# Patient Record
Sex: MALE | Race: WHITE | NOT HISPANIC OR LATINO | Employment: OTHER | URBAN - METROPOLITAN AREA
[De-identification: names, ages, dates, MRNs, and addresses within clinical notes are randomized per-mention and may not be internally consistent; named-entity substitution may affect disease eponyms.]

---

## 2017-12-06 ENCOUNTER — OFFICE VISIT (OUTPATIENT)
Dept: SLEEP MEDICINE | Facility: HOSPITAL | Age: 72
End: 2017-12-06
Attending: INTERNAL MEDICINE

## 2017-12-06 VITALS
DIASTOLIC BLOOD PRESSURE: 91 MMHG | SYSTOLIC BLOOD PRESSURE: 167 MMHG | HEART RATE: 78 BPM | BODY MASS INDEX: 42.62 KG/M2 | HEIGHT: 69 IN | WEIGHT: 287.75 LBS | OXYGEN SATURATION: 96 %

## 2017-12-06 DIAGNOSIS — G47.33 OSA ON CPAP: Primary | ICD-10-CM

## 2017-12-06 DIAGNOSIS — G47.14 HYPERSOMNIA DUE TO ANOTHER MEDICAL CONDITION: ICD-10-CM

## 2017-12-06 DIAGNOSIS — IMO0001 CLASS 3 OBESITY DUE TO EXCESS CALORIES WITH SERIOUS COMORBIDITY AND BODY MASS INDEX (BMI) OF 40.0 TO 44.9 IN ADULT: ICD-10-CM

## 2017-12-06 DIAGNOSIS — Z99.89 OSA ON CPAP: Primary | ICD-10-CM

## 2017-12-06 PROCEDURE — 99204 OFFICE O/P NEW MOD 45 MIN: CPT | Performed by: INTERNAL MEDICINE

## 2017-12-06 PROCEDURE — G0463 HOSPITAL OUTPT CLINIC VISIT: HCPCS

## 2017-12-06 NOTE — PROGRESS NOTES
"[facility] New Patient/Consultation       Reason for Consultation: STEF    Patient Care Team:  Mariya Lopez MD as PCP - General (Internal Medicine)  Jensen Mas MD as Consulting Physician (Sleep Medicine)    Chief complaint:  STEF    History of present illness:  Thank you for asking me to see your patient.  The patient is a 72 y.o. male who I last saw in December 2010.  He has STEF treated with CPAP +16.  His weight is stable since I last saw him.  He goes to bed at 11 PM and awakens between 7 and 9 AM.  He feels okay upon arising.  He might take 1 or 2 naps.  He has no complaints of hypersomnolence.  His [place] Sleepiness Scale is normal at 5.  He does awaken with a dry mouth.  He has leg jerking at night.  He awakens twice to use the restroom.    Review of Systems:  Recorded on the Sleep Questionnaire.  Unremarkable except for frequent urination, painful joints, swollen ankles toward the end of the day.    History:  Past Medical History:   Diagnosis Date   • Diabetes mellitus    • Hypertension    , No past surgical history on file., No family history on file. and   Social History   Substance Use Topics   • Smoking status: Former Smoker   • Smokeless tobacco: None   • Alcohol use None       Social History:  He is retired.  He smoked between the ages of 14 and 33.  He has 3 cups of coffee a day.    Allergies:  Review of patient's allergies indicates no known allergies.     Medication Review: Reviewed.    Vital Signs:    Vitals:    12/06/17 0943   BP: 167/91   BP Location: Left arm   Patient Position: Sitting   Pulse: 78   SpO2: 96%   Weight: 131 kg (287 lb 12 oz)   Height: 174 cm (68.5\")      Body mass index is 43.12 kg/(m^2).    Physical Exam:  Recorded on the [facility] Physical Exam Form and is unremarkable except for:  A large tongue and uvula and narrow posterior pharyngeal opening and class II-III MP airway.  He does have a systolic ejection murmur audible.  Trace 1+ " pretibial edema noted.     Results Review:  DME is [facility] and he uses a fullface mask.  Downloads between June 9 and December 5, 2017 compliances 99% and average usage is 7 hours and 59 minutes and he uses CPAP +16.  No diagnostics available.       Impression:   Obstructive sleep apnea treated with CPAP +16 with good compliance and usage.  He has some complaints of hypersomnolence because she will take 1 or 2 naps a day.  However, [place] Sleepiness Scale is normal.    Plan:  Good sleep hygiene measures should be maintained.  Weight loss would be beneficial in this patient who is morbidly obese.    I did reviewed the pathophysiology of STEF to the patient.  He has compliant.        The patient will continue CPAP.  Since his device is obsolete, I will order a new auto CPAP device between 15 and 20 cm water pressure.  Additionally, all needed supplies requested.  I will see the patient back in 3 months to check on the new device.     Thank you for requesting me to assist in this patient's care.    Jensen Mas MD  12/06/17  10:10 AM

## 2017-12-10 PROBLEM — G47.14 HYPERSOMNIA DUE TO ANOTHER MEDICAL CONDITION: Status: ACTIVE | Noted: 2017-12-10

## 2017-12-10 PROBLEM — IMO0001 CLASS 3 OBESITY DUE TO EXCESS CALORIES WITH SERIOUS COMORBIDITY AND BODY MASS INDEX (BMI) OF 40.0 TO 44.9 IN ADULT: Status: ACTIVE | Noted: 2017-12-10

## 2017-12-10 PROBLEM — G47.33 OSA ON CPAP: Status: ACTIVE | Noted: 2017-12-10

## 2017-12-10 PROBLEM — Z99.89 OSA ON CPAP: Status: ACTIVE | Noted: 2017-12-10

## 2017-12-13 ENCOUNTER — TELEPHONE (OUTPATIENT)
Dept: SLEEP MEDICINE | Facility: HOSPITAL | Age: 72
End: 2017-12-13

## 2017-12-13 NOTE — TELEPHONE ENCOUNTER
Pt called in stating [facility] did not receive DME order, pt was inquiring to if we had it and could send it. Could not find chart on pt, may have been broke down and send to Medical Records. Informed pt to call back on Friday to see if paper work has been scanned in by medical records.

## 2017-12-13 NOTE — TELEPHONE ENCOUNTER
Tech called pt @ home #, no answer. Left vm informing pt that [facility] did receive orders and were working on processing at this time. MAB

## 2018-03-08 ENCOUNTER — OFFICE VISIT (OUTPATIENT)
Dept: SLEEP MEDICINE | Facility: HOSPITAL | Age: 73
End: 2018-03-08
Attending: INTERNAL MEDICINE

## 2018-03-08 DIAGNOSIS — G47.33 OSA ON CPAP: Primary | ICD-10-CM

## 2018-03-08 DIAGNOSIS — Z99.89 OSA ON CPAP: Primary | ICD-10-CM

## 2018-03-08 DIAGNOSIS — IMO0001 CLASS 3 OBESITY DUE TO EXCESS CALORIES WITH SERIOUS COMORBIDITY AND BODY MASS INDEX (BMI) OF 40.0 TO 44.9 IN ADULT: ICD-10-CM

## 2018-03-08 PROCEDURE — G0463 HOSPITAL OUTPT CLINIC VISIT: HCPCS

## 2018-03-08 PROCEDURE — 99213 OFFICE O/P EST LOW 20 MIN: CPT | Performed by: INTERNAL MEDICINE

## 2018-03-08 NOTE — PROGRESS NOTES
Follow Up [facility] Note       Patient Care Team:  Mariya Lopez MD as PCP - General (Internal Medicine)  Jensen Mas MD as Consulting Physician (Sleep Medicine)    Chief Complaint:  STEF     Interval History:   The patient was last seen by me in December 2017.  He is stable and unchanged.  He goes to bed at 10 PM awakens at 7 AM.  He awakens twice for the bathroom.  [place] Sleepiness Scale is normal at 5.    Review of Systems:  Recorded on the Sleep Questionnaire.  Unremarkable .    Social History:  He will have 3 cups of coffee a day  Social History     Social History   • Marital status:      Social History Main Topics   • Smoking status: Former Smoker      Comment: Smoked between the ages of 25 and 40.   • Alcohol use No   • Drug use: No     Other Topics Concern   • Not on file       Allergies:  Review of patient's allergies indicates no known allergies.     Medication Review:  Reviewed.      Vital Signs:  Height 68.5 inches and weight 278 and he is morbidly obese with a body mass index of 42    Physical Exam:    Constitutional:  Well developed white male and appears in no apparent distress.  Awake & oriented times 3.  Normal mood with normal recent and remote memory and normal judgement.  Eyes:  Conjunctivae normal.  Oropharynx:  moist mucous membranes without exudate and a large tongue and uvula and narrow posterior pharyngeal opening and class II-III MP airway.      Results Review:  DME is [facility] and he uses a fullface mask.  Downloads between September 9, 2017 and March 7, 2018 compliances 84%.  Average usage is 8 hours and 9 minutes.  Average AHI is normal without leak.  Average AutoCPAP pressure is 16.8 and his auto CPAP is 15-20.       Impression:   Obstructive sleep apnea adequately treated with auto titrating CPAP with good compliance and usage and no complaints of hypersomnolence.      Plan:  Good sleep hygiene measures should be maintained.  Weight loss would  be beneficial in this patient who is obese by BMI.  The patient is benefiting from the treatment being provided.     The patient will continue auto CPAP.    The patient will call for any problems and will follow up in one year.      Jensen Mas MD  03/17/18  8:54 AM

## 2019-03-07 ENCOUNTER — OFFICE VISIT (OUTPATIENT)
Dept: SLEEP MEDICINE | Facility: HOSPITAL | Age: 74
End: 2019-03-07
Attending: INTERNAL MEDICINE

## 2019-03-07 VITALS — WEIGHT: 298 LBS | HEIGHT: 68 IN | BODY MASS INDEX: 45.16 KG/M2

## 2019-03-07 DIAGNOSIS — E66.01 CLASS 3 SEVERE OBESITY DUE TO EXCESS CALORIES WITH SERIOUS COMORBIDITY AND BODY MASS INDEX (BMI) OF 45.0 TO 49.9 IN ADULT (HCC): ICD-10-CM

## 2019-03-07 DIAGNOSIS — Z99.89 OSA ON CPAP: Primary | ICD-10-CM

## 2019-03-07 DIAGNOSIS — G47.33 OSA ON CPAP: Primary | ICD-10-CM

## 2019-03-07 PROCEDURE — G0463 HOSPITAL OUTPT CLINIC VISIT: HCPCS

## 2019-03-07 PROCEDURE — 99213 OFFICE O/P EST LOW 20 MIN: CPT | Performed by: INTERNAL MEDICINE

## 2019-03-07 NOTE — PROGRESS NOTES
"Follow Up [facility] Note     Chief Complaint:  STEF     Primary Care Physician: Mariya Lopez MD    Interval History:   I last saw the patient one year ago.  He states he is unchanged.  He goes to bed at 10 PM and awakens at 7 AM.  Foot or leg pain will awaken him.  [place] Sleepiness Scale is normal 1.    Since I last saw him, he states he was diagnosed with severe aortic stenosis.    Review of Systems:  Recorded on the Sleep Questionnaire.  Unremarkable except for nasal congestion or postnasal drip, COLVIN, MAGY    Social History:    Social History     Socioeconomic History   • Marital status:      Spouse name: Not on file   • Number of children: Not on file   • Years of education: Not on file   • Highest education level: Not on file   Tobacco Use   • Smoking status: Former Smoker   • Tobacco comment: Smoked between the ages of 25 and 40.   Substance and Sexual Activity   • Alcohol use: No   • Drug use: No       Allergies:  Patient has no known allergies.     Medication Review:  Reviewed.      Vital Signs:    Vitals:    03/07/19 0835   Weight: 135 kg (298 lb)   Height: 172.7 cm (68\")     Body mass index is 45.31 kg/m².    Physical Exam:    Constitutional:  Well developed 73 y.o. male that appears in no apparent distress.  Awake & oriented times 3.  Normal mood with normal recent and remote memory and normal judgement.  Eyes:  Conjunctivae normal.  Oropharynx:  moist mucous membranes without exudate and a large tongue and uvula and narrow posterior pharyngeal opening and class II-3 MP airway     Results Review:  DME is [facility] and he uses a fullface mask.  No downloads presently available.      Downloads between 12/18 and 3/17/2019 reviewed.  Compliance 100% and average usage 8 hours and 43 minutes and average AHI is normal without leak and average auto CPAP pressure is 16.6 and his auto CPAP is 15-20.     Impression:   Obstructive sleep apnea previously adequately treated with auto CPAP " with with previously demonstrated good compliance and usage and presently no complaints of hypersomnolence.    Plan:  Good sleep hygiene measures should be maintained.  Weight loss would be beneficial in this patient who is morbidly obese by BMI.  The patient is benefiting from the treatment being provided.     The patient will continue auto CPAP.  The sleep disorder staff did review a large/large [employer] fullface mask.      The patient will call for any problems and will follow up in 1 year.      Jensen Mas MD  Sleep Medicine  03/07/19  8:46 AM

## 2019-03-10 PROBLEM — E66.01 CLASS 3 SEVERE OBESITY DUE TO EXCESS CALORIES WITH SERIOUS COMORBIDITY AND BODY MASS INDEX (BMI) OF 45.0 TO 49.9 IN ADULT: Status: ACTIVE | Noted: 2017-12-10

## 2019-03-10 PROBLEM — E66.813 CLASS 3 SEVERE OBESITY DUE TO EXCESS CALORIES WITH SERIOUS COMORBIDITY AND BODY MASS INDEX (BMI) OF 45.0 TO 49.9 IN ADULT: Status: ACTIVE | Noted: 2017-12-10

## 2019-03-11 ENCOUNTER — TELEPHONE (OUTPATIENT)
Dept: SLEEP MEDICINE | Facility: HOSPITAL | Age: 74
End: 2019-03-11

## 2019-03-11 NOTE — TELEPHONE ENCOUNTER
Spoke with patient, he will bring in his data card Monday . patient tried [employer]wear full face and did not like it, he requested to go back to his previous mask. Sending orders to [facility] sleep.

## 2019-03-18 ENCOUNTER — TRANSCRIBE ORDERS (OUTPATIENT)
Dept: CARDIOLOGY | Facility: CLINIC | Age: 74
End: 2019-03-18

## 2019-03-18 ENCOUNTER — OFFICE VISIT (OUTPATIENT)
Dept: CARDIOLOGY | Facility: CLINIC | Age: 74
End: 2019-03-18

## 2019-03-18 ENCOUNTER — LAB (OUTPATIENT)
Dept: LAB | Facility: HOSPITAL | Age: 74
End: 2019-03-18

## 2019-03-18 VITALS
SYSTOLIC BLOOD PRESSURE: 160 MMHG | WEIGHT: 297 LBS | HEART RATE: 77 BPM | HEIGHT: 68 IN | BODY MASS INDEX: 45.01 KG/M2 | DIASTOLIC BLOOD PRESSURE: 88 MMHG

## 2019-03-18 DIAGNOSIS — Z13.6 SCREENING FOR CARDIOVASCULAR CONDITION: Primary | ICD-10-CM

## 2019-03-18 DIAGNOSIS — I35.0 NONRHEUMATIC AORTIC VALVE STENOSIS: Primary | ICD-10-CM

## 2019-03-18 DIAGNOSIS — I35.0 NONRHEUMATIC AORTIC (VALVE) STENOSIS: ICD-10-CM

## 2019-03-18 DIAGNOSIS — Z01.810 PREPROCEDURAL CARDIOVASCULAR EXAMINATION: ICD-10-CM

## 2019-03-18 DIAGNOSIS — Z13.6 SCREENING FOR CARDIOVASCULAR CONDITION: ICD-10-CM

## 2019-03-18 LAB
ANION GAP SERPL CALCULATED.3IONS-SCNC: 16.1 MMOL/L
BASOPHILS # BLD AUTO: 0.06 10*3/MM3 (ref 0–0.2)
BASOPHILS NFR BLD AUTO: 0.7 % (ref 0–1.5)
BUN BLD-MCNC: 16 MG/DL (ref 8–23)
BUN/CREAT SERPL: 15.7 (ref 7–25)
CALCIUM SPEC-SCNC: 9.9 MG/DL (ref 8.6–10.5)
CHLORIDE SERPL-SCNC: 97 MMOL/L (ref 98–107)
CO2 SERPL-SCNC: 27.9 MMOL/L (ref 22–29)
CREAT BLD-MCNC: 1.02 MG/DL (ref 0.76–1.27)
DEPRECATED RDW RBC AUTO: 46.4 FL (ref 37–54)
EOSINOPHIL # BLD AUTO: 0.51 10*3/MM3 (ref 0–0.4)
EOSINOPHIL NFR BLD AUTO: 6.4 % (ref 0.3–6.2)
ERYTHROCYTE [DISTWIDTH] IN BLOOD BY AUTOMATED COUNT: 14 % (ref 12.3–15.4)
GFR SERPL CREATININE-BSD FRML MDRD: 71 ML/MIN/1.73
GLUCOSE BLD-MCNC: 167 MG/DL (ref 65–99)
HCT VFR BLD AUTO: 44.9 % (ref 37.5–51)
HGB BLD-MCNC: 14.2 G/DL (ref 13–17.7)
IMM GRANULOCYTES # BLD AUTO: 0.03 10*3/MM3 (ref 0–0.05)
IMM GRANULOCYTES NFR BLD AUTO: 0.4 % (ref 0–0.5)
LYMPHOCYTES # BLD AUTO: 2.28 10*3/MM3 (ref 0.7–3.1)
LYMPHOCYTES NFR BLD AUTO: 28.4 % (ref 19.6–45.3)
MCH RBC QN AUTO: 28.7 PG (ref 26.6–33)
MCHC RBC AUTO-ENTMCNC: 31.6 G/DL (ref 31.5–35.7)
MCV RBC AUTO: 90.9 FL (ref 79–97)
MONOCYTES # BLD AUTO: 0.86 10*3/MM3 (ref 0.1–0.9)
MONOCYTES NFR BLD AUTO: 10.7 % (ref 5–12)
NEUTROPHILS # BLD AUTO: 4.29 10*3/MM3 (ref 1.4–7)
NEUTROPHILS NFR BLD AUTO: 53.4 % (ref 42.7–76)
NRBC BLD AUTO-RTO: 0 /100 WBC (ref 0–0)
PLATELET # BLD AUTO: 181 10*3/MM3 (ref 140–450)
PMV BLD AUTO: 11 FL (ref 6–12)
POTASSIUM BLD-SCNC: 4.4 MMOL/L (ref 3.5–5.2)
RBC # BLD AUTO: 4.94 10*6/MM3 (ref 4.14–5.8)
SODIUM BLD-SCNC: 141 MMOL/L (ref 136–145)
WBC NRBC COR # BLD: 8.03 10*3/MM3 (ref 3.4–10.8)

## 2019-03-18 PROCEDURE — 85025 COMPLETE CBC W/AUTO DIFF WBC: CPT

## 2019-03-18 PROCEDURE — 36415 COLL VENOUS BLD VENIPUNCTURE: CPT

## 2019-03-18 PROCEDURE — 80048 BASIC METABOLIC PNL TOTAL CA: CPT

## 2019-03-18 PROCEDURE — 93000 ELECTROCARDIOGRAM COMPLETE: CPT | Performed by: INTERNAL MEDICINE

## 2019-03-18 PROCEDURE — 99204 OFFICE O/P NEW MOD 45 MIN: CPT | Performed by: INTERNAL MEDICINE

## 2019-03-18 NOTE — PROGRESS NOTES
Subjective:     Encounter Date:03/18/2019      Patient ID: Bird Gallegos is a 73 y.o. male.    Chief Complaint:  This is a 73-year-old man with past medical history of obstructive sleep apnea, diabetes on oral medications, hypertension.  He was recently evaluated by his primary care physician who noted a significant murmur.  The patient notes that over the last year or so he has had increasing dyspnea on exertion.  For that reason he is gained about 30-35 pounds due to lack of activity.  He never has chest pain, palpitations, shortness dizziness or syncope.  He denies orthopnea or PND but he does note lower extremity edema.  He has dyspnea with walking more than she is doing well 25-50 feet.  He had an echocardiogram at [facility] which showed severe aortic stenosis with a mean gradient of 45 mmHg and a peak velocity of 4.5 m/s squared, with a calculated KELSEA of 0.51 cm².  He had normal systolic function.  It is not noted whether the aortic valve is trileaflet are not.  He had a stress nuclear test in 2017 which showed normal perfusion.  He is a former .  He quit smoking 30 years ago.  He is a recent .  His daughter is a former nurse and accompanies him today.  This a recent A1c is not available to me.  Creatinine is normal.               Summary   The ejection fraction biplane was calculated at 62%   There is moderate septal and mild posterior wall hypertrophy.   Overall left ventricular function is normal.   No regional wall motion abnormalites noted.   Mildly dilated left atrium.   Severe aortic stenosis is present.   Mild mitral regurgitation is present.   Mild tricuspid regurgitation.   Right ventricular systolic pressure of 46 mmHg.     Any valve disease noted in the report is non-rheumatic unless otherwise   specifically noted.     Findings     Left Ventricle   The ejection fraction biplane was calculated at 62%   Left ventricular size is normal.   There is moderate septal and mild posterior  wall hypertrophy.   Overall left ventricular function is normal.   No regional wall motion abnormalites noted.   Normal diastolic filling pattern for age.     Left Atrium   Mildly dilated left atrium.     Right Ventricle   Normal right ventricular size and function.     Right Atrium   The right atrial chamber size appears normal.     Aortic Valve   The peak instantaneous gradient of the aortic valve is 88 mmHg. The mean   gradient of the aortic valve is 58 mmHg. The aortic valve area by VTI, is   calculated at .51 cm2.   The aortic valve leaflets were not well visualized.   No evidence of aortic valve regurgitation.   Severe aortic stenosis is present.     Mitral Valve   Structurally normal mitral valve.   There is no evidence of mitral valve prolapse noted.   Mild mitral regurgitation is present.   No evidence of mitral valve stenosis.     Tricuspid Valve   Right ventricular systolic pressure of 46 mmHg.   Tricuspid valve was not well visualized.   Mild tricuspid regurgitation.   No evidence of tricuspid stenosis.     Pulmonic Valve   The pulmonic valve was not well visualized .   No evidence of pulmonic regurgitation.   No evidence of pulmonic valve stenosis.     Pericardium   There is no evidence of pericardial effusion.   A pericardial fat pad is visualized.     Great Vessels   Aortic root is not well visualized.   Aorta was not clearly visualized.   Aortic root dimension within normal limits.    M-Mode/2D Measurements & Calculations     LV Diastolic Dimension:  LV Systolic Dimension:   LA Dimension: 4 cmAO Root   5.06 cm                  3.64 cm                  Dimension: 3.7 cmLA Area:   LV PW Diastolic: 1.32 cm LV Volume Diastolic: 154 30 cm^2   Septum Diastolic: 1.68   ml   cm                       LV Volume Systolic: 58                            ml                            LV EDV/LV EDV Index: 154                            ml/65 m^2LV ESV/LV ESV   LV Area Diastolic: 38.5  Index: 58 ml/24  m^2      LA/Aorta: 1.08   cm^2                     EF Calculated: 62.3 %   LV Area Systolic: 22.8                            LA volume: 105ml   cm^2                     LV Length: 9.36 cm       LA volume/Index: 44ml/m^2                                                     RA Area: 18 cm^2                            LVOT: 1.6 cm             RA volume: 43 ml                                                     RA volume index: 18 ml/m2    Doppler Measurements & Calculations     MV Peak E-Wave: 0.49  AV Peak Velocity: 4.52 m/s   LVOT Peak Velocity: 1.25   m/s                   AV Peak Gradient: 81.72 mmHg m/s   MV Peak A-Wave: 0.76  AV Mean Velocity: 2.97 m/s   LVOT Mean Velocity: 0.75   m/s                   AV Mean Gradient: 46 mmHg    m/s   MV E/A Ratio: 0.65    AV VTI: 104 cm               LVOT Peak Gradient: 5                         AV Area (Continuity):0.58    mmHgLVOT Mean Gradient: 3                         cm^2                         mmHg                                                      Estimated RVSP: 46 mmHg                         LVOT VTI: 29.9 cm            Estimated RAP:3 mmHg     MV E' Septal          Estimated PASP: 45.51 mmHg   Velocity: 0.1 m/s                                  TR Velocity:3.26 m/s   MV E' Lateral                                      TR Gradient:42.51 mmHg   Velocity: 0.06 m/s    Qs:60.12                     TV TAPSE:26 mm   MV E/E' septal: 4.97   MV E/E' lateral: 7.79    The following portions of the patient's history were reviewed and updated as appropriate: allergies, current medications, past family history, past medical history, past social history, past surgical history and problem list.    Past Medical History:   Diagnosis Date   • Aortic stenosis    • Diabetes mellitus (CMS/HCC)    • COLVIN (dyspnea on exertion)    • GERD (gastroesophageal reflux disease)    • Hypertension    • STEF (obstructive sleep apnea)    • STEF on auto CPAP        Family History   Problem Relation Age of  Onset   • Heart disease Mother    • Cancer Father    • Hypertension Brother    • Hypertension Brother        Social History     Socioeconomic History   • Marital status:      Spouse name: Not on file   • Number of children: Not on file   • Years of education: Not on file   • Highest education level: Not on file   Tobacco Use   • Smoking status: Former Smoker   • Tobacco comment: Smoked between the ages of 25 and 40.   Substance and Sexual Activity   • Alcohol use: No   • Drug use: No       No Known Allergies    Past Surgical History:   Procedure Laterality Date   • BACK SURGERY     • KNEE SURGERY     • WRIST SURGERY Bilateral        Review of Systems   Constitution: Positive for weakness, malaise/fatigue and weight gain.   Eyes: Positive for blurred vision.   Cardiovascular: Positive for dyspnea on exertion and leg swelling. Negative for chest pain, irregular heartbeat, near-syncope, orthopnea, palpitations and syncope.   Respiratory: Positive for shortness of breath and snoring.          ECG 12 Lead  Date/Time: 3/18/2019 12:23 PM  Performed by: Wendy Braxton MD  Authorized by: Wendy Braxton MD   Previous ECG: no previous ECG available  Rhythm: sinus rhythm  Rate: normal  ST Segments: ST segments normal  T Waves: T waves normal  QRS axis: normal  Other findings: non-specific ST-T wave changes    Clinical impression: non-specific ECG  Comments: Anterior ST with mild elevation, nonspecific               Objective:     Physical Exam  GEN: no distress, alert and oriented  Eyes: normal sclera, normal lids and lashes  HENT: moist mucus membranes,   Lungs CTAB, no rales or wheezes  Chest: no abnormalities  Neck: Too large to evaluate JVD.  CV: RRR, harsh, 3 out of 6 systolic murmur radiating to the carotids and apex.    Abdo: Obese soft,  Nontender, nondistended  Extr: +2 bilateral edema lower extremity  Skin: no rash, warm, dry  Heme/Lymph: no bruising    Psych: organized thought, normal behavior and  affect    Lab Review:         Assessment:          Diagnosis Plan   1. Nonrheumatic aortic valve stenosis  Case Request Cath Lab: Coronary angiography, Left heart catheterization, Left ventricular angiography          Plan:         This is a 73-year-old man with hypertension, sleep apnea, morbid obesity.  He presents for evaluation of severe aortic stenosis.  He is definitely symptomatic with signs of heart failure on exam.  I will set him up for a cardiac cath to evaluation his coronary arteries, after which I will refer him to our TAVR team.  I discussed aortic stenosis and the possible treatment modalities.  We also discussed that this will be fatal if not intervened on eventually.  The patient is motivated to have this fixed as his quality of life has significantly declined over the last year.         Wendy Braxton MD  03/18/19

## 2019-03-18 NOTE — H&P (VIEW-ONLY)
Subjective:     Encounter Date:03/18/2019      Patient ID: Bird Gallegos is a 73 y.o. male.    Chief Complaint:  This is a 73-year-old man with past medical history of obstructive sleep apnea, diabetes on oral medications, hypertension.  He was recently evaluated by his primary care physician who noted a significant murmur.  The patient notes that over the last year or so he has had increasing dyspnea on exertion.  For that reason he is gained about 30-35 pounds due to lack of activity.  He never has chest pain, palpitations, shortness dizziness or syncope.  He denies orthopnea or PND but he does note lower extremity edema.  He has dyspnea with walking more than she is doing well 25-50 feet.  He had an echocardiogram at [facility] which showed severe aortic stenosis with a mean gradient of 45 mmHg and a peak velocity of 4.5 m/s squared, with a calculated KELSEA of 0.51 cm².  He had normal systolic function.  It is not noted whether the aortic valve is trileaflet are not.  He had a stress nuclear test in 2017 which showed normal perfusion.  He is a former .  He quit smoking 30 years ago.  He is a recent .  His daughter is a former nurse and accompanies him today.  This a recent A1c is not available to me.  Creatinine is normal.               Summary   The ejection fraction biplane was calculated at 62%   There is moderate septal and mild posterior wall hypertrophy.   Overall left ventricular function is normal.   No regional wall motion abnormalites noted.   Mildly dilated left atrium.   Severe aortic stenosis is present.   Mild mitral regurgitation is present.   Mild tricuspid regurgitation.   Right ventricular systolic pressure of 46 mmHg.     Any valve disease noted in the report is non-rheumatic unless otherwise   specifically noted.     Findings     Left Ventricle   The ejection fraction biplane was calculated at 62%   Left ventricular size is normal.   There is moderate septal and mild posterior  wall hypertrophy.   Overall left ventricular function is normal.   No regional wall motion abnormalites noted.   Normal diastolic filling pattern for age.     Left Atrium   Mildly dilated left atrium.     Right Ventricle   Normal right ventricular size and function.     Right Atrium   The right atrial chamber size appears normal.     Aortic Valve   The peak instantaneous gradient of the aortic valve is 88 mmHg. The mean   gradient of the aortic valve is 58 mmHg. The aortic valve area by VTI, is   calculated at .51 cm2.   The aortic valve leaflets were not well visualized.   No evidence of aortic valve regurgitation.   Severe aortic stenosis is present.     Mitral Valve   Structurally normal mitral valve.   There is no evidence of mitral valve prolapse noted.   Mild mitral regurgitation is present.   No evidence of mitral valve stenosis.     Tricuspid Valve   Right ventricular systolic pressure of 46 mmHg.   Tricuspid valve was not well visualized.   Mild tricuspid regurgitation.   No evidence of tricuspid stenosis.     Pulmonic Valve   The pulmonic valve was not well visualized .   No evidence of pulmonic regurgitation.   No evidence of pulmonic valve stenosis.     Pericardium   There is no evidence of pericardial effusion.   A pericardial fat pad is visualized.     Great Vessels   Aortic root is not well visualized.   Aorta was not clearly visualized.   Aortic root dimension within normal limits.    M-Mode/2D Measurements & Calculations     LV Diastolic Dimension:  LV Systolic Dimension:   LA Dimension: 4 cmAO Root   5.06 cm                  3.64 cm                  Dimension: 3.7 cmLA Area:   LV PW Diastolic: 1.32 cm LV Volume Diastolic: 154 30 cm^2   Septum Diastolic: 1.68   ml   cm                       LV Volume Systolic: 58                            ml                            LV EDV/LV EDV Index: 154                            ml/65 m^2LV ESV/LV ESV   LV Area Diastolic: 38.5  Index: 58 ml/24  m^2      LA/Aorta: 1.08   cm^2                     EF Calculated: 62.3 %   LV Area Systolic: 22.8                            LA volume: 105ml   cm^2                     LV Length: 9.36 cm       LA volume/Index: 44ml/m^2                                                     RA Area: 18 cm^2                            LVOT: 1.6 cm             RA volume: 43 ml                                                     RA volume index: 18 ml/m2    Doppler Measurements & Calculations     MV Peak E-Wave: 0.49  AV Peak Velocity: 4.52 m/s   LVOT Peak Velocity: 1.25   m/s                   AV Peak Gradient: 81.72 mmHg m/s   MV Peak A-Wave: 0.76  AV Mean Velocity: 2.97 m/s   LVOT Mean Velocity: 0.75   m/s                   AV Mean Gradient: 46 mmHg    m/s   MV E/A Ratio: 0.65    AV VTI: 104 cm               LVOT Peak Gradient: 5                         AV Area (Continuity):0.58    mmHgLVOT Mean Gradient: 3                         cm^2                         mmHg                                                      Estimated RVSP: 46 mmHg                         LVOT VTI: 29.9 cm            Estimated RAP:3 mmHg     MV E' Septal          Estimated PASP: 45.51 mmHg   Velocity: 0.1 m/s                                  TR Velocity:3.26 m/s   MV E' Lateral                                      TR Gradient:42.51 mmHg   Velocity: 0.06 m/s    Qs:60.12                     TV TAPSE:26 mm   MV E/E' septal: 4.97   MV E/E' lateral: 7.79    The following portions of the patient's history were reviewed and updated as appropriate: allergies, current medications, past family history, past medical history, past social history, past surgical history and problem list.    Past Medical History:   Diagnosis Date   • Aortic stenosis    • Diabetes mellitus (CMS/HCC)    • COLVIN (dyspnea on exertion)    • GERD (gastroesophageal reflux disease)    • Hypertension    • STEF (obstructive sleep apnea)    • STEF on auto CPAP        Family History   Problem Relation Age of  Onset   • Heart disease Mother    • Cancer Father    • Hypertension Brother    • Hypertension Brother        Social History     Socioeconomic History   • Marital status:      Spouse name: Not on file   • Number of children: Not on file   • Years of education: Not on file   • Highest education level: Not on file   Tobacco Use   • Smoking status: Former Smoker   • Tobacco comment: Smoked between the ages of 25 and 40.   Substance and Sexual Activity   • Alcohol use: No   • Drug use: No       No Known Allergies    Past Surgical History:   Procedure Laterality Date   • BACK SURGERY     • KNEE SURGERY     • WRIST SURGERY Bilateral        Review of Systems   Constitution: Positive for weakness, malaise/fatigue and weight gain.   Eyes: Positive for blurred vision.   Cardiovascular: Positive for dyspnea on exertion and leg swelling. Negative for chest pain, irregular heartbeat, near-syncope, orthopnea, palpitations and syncope.   Respiratory: Positive for shortness of breath and snoring.          ECG 12 Lead  Date/Time: 3/18/2019 12:23 PM  Performed by: Wendy Braxton MD  Authorized by: Wendy Braxton MD   Previous ECG: no previous ECG available  Rhythm: sinus rhythm  Rate: normal  ST Segments: ST segments normal  T Waves: T waves normal  QRS axis: normal  Other findings: non-specific ST-T wave changes    Clinical impression: non-specific ECG  Comments: Anterior ST with mild elevation, nonspecific               Objective:     Physical Exam  GEN: no distress, alert and oriented  Eyes: normal sclera, normal lids and lashes  HENT: moist mucus membranes,   Lungs CTAB, no rales or wheezes  Chest: no abnormalities  Neck: Too large to evaluate JVD.  CV: RRR, harsh, 3 out of 6 systolic murmur radiating to the carotids and apex.    Abdo: Obese soft,  Nontender, nondistended  Extr: +2 bilateral edema lower extremity  Skin: no rash, warm, dry  Heme/Lymph: no bruising    Psych: organized thought, normal behavior and  affect    Lab Review:         Assessment:          Diagnosis Plan   1. Nonrheumatic aortic valve stenosis  Case Request Cath Lab: Coronary angiography, Left heart catheterization, Left ventricular angiography          Plan:         This is a 73-year-old man with hypertension, sleep apnea, morbid obesity.  He presents for evaluation of severe aortic stenosis.  He is definitely symptomatic with signs of heart failure on exam.  I will set him up for a cardiac cath to evaluation his coronary arteries, after which I will refer him to our TAVR team.  I discussed aortic stenosis and the possible treatment modalities.  We also discussed that this will be fatal if not intervened on eventually.  The patient is motivated to have this fixed as his quality of life has significantly declined over the last year.         Wendy Braxton MD  03/18/19

## 2019-03-21 ENCOUNTER — APPOINTMENT (OUTPATIENT)
Dept: CARDIOLOGY | Facility: HOSPITAL | Age: 74
End: 2019-03-21

## 2019-03-21 ENCOUNTER — PREP FOR SURGERY (OUTPATIENT)
Dept: OTHER | Facility: HOSPITAL | Age: 74
End: 2019-03-21

## 2019-03-21 ENCOUNTER — HOSPITAL ENCOUNTER (OUTPATIENT)
Facility: HOSPITAL | Age: 74
Setting detail: HOSPITAL OUTPATIENT SURGERY
Discharge: HOME | End: 2019-03-21
Attending: INTERNAL MEDICINE | Admitting: INTERNAL MEDICINE

## 2019-03-21 VITALS
WEIGHT: 297 LBS | TEMPERATURE: 97.7 F | SYSTOLIC BLOOD PRESSURE: 151 MMHG | DIASTOLIC BLOOD PRESSURE: 74 MMHG | RESPIRATION RATE: 16 BRPM | OXYGEN SATURATION: 95 % | BODY MASS INDEX: 43.99 KG/M2 | HEIGHT: 69 IN | HEART RATE: 73 BPM

## 2019-03-21 DIAGNOSIS — I65.23 OCCLUSION AND STENOSIS OF BILATERAL CAROTID ARTERIES: ICD-10-CM

## 2019-03-21 DIAGNOSIS — R09.89 CAROTID BRUIT, UNSPECIFIED LATERALITY: ICD-10-CM

## 2019-03-21 DIAGNOSIS — I35.0 AORTIC VALVE STENOSIS, ETIOLOGY OF CARDIAC VALVE DISEASE UNSPECIFIED: Primary | ICD-10-CM

## 2019-03-21 DIAGNOSIS — I50.33 ACUTE ON CHRONIC DIASTOLIC CHF (CONGESTIVE HEART FAILURE) (HCC): ICD-10-CM

## 2019-03-21 DIAGNOSIS — I35.0 NONRHEUMATIC AORTIC VALVE STENOSIS: ICD-10-CM

## 2019-03-21 DIAGNOSIS — I25.10 CORONARY ARTERY DISEASE INVOLVING NATIVE CORONARY ARTERY OF NATIVE HEART, ANGINA PRESENCE UNSPECIFIED: ICD-10-CM

## 2019-03-21 DIAGNOSIS — R79.1 ABNORMAL COAGULATION PROFILE: ICD-10-CM

## 2019-03-21 DIAGNOSIS — I70.90 ATHEROSCLEROSIS: ICD-10-CM

## 2019-03-21 DIAGNOSIS — I35.0 AORTIC STENOSIS, SEVERE: Primary | ICD-10-CM

## 2019-03-21 DIAGNOSIS — E11.40 TYPE 2 DIABETES MELLITUS WITH DIABETIC NEUROPATHY (HCC): ICD-10-CM

## 2019-03-21 DIAGNOSIS — I11.0 HYPERTENSIVE HEART DISEASE WITH HEART FAILURE (HCC): ICD-10-CM

## 2019-03-21 LAB
AORTIC DIMENSIONLESS INDEX: 0.2 (DI)
BH CV ECHO MEAS - ACS: 0.8 CM
BH CV ECHO MEAS - AO MAX PG: 82 MMHG
BH CV ECHO MEAS - AO MEAN PG (FULL): 49 MMHG
BH CV ECHO MEAS - AO MEAN PG: 51 MMHG
BH CV ECHO MEAS - AO ROOT AREA (BSA CORRECTED): 1.4
BH CV ECHO MEAS - AO ROOT AREA: 9.1 CM^2
BH CV ECHO MEAS - AO ROOT DIAM: 3.4 CM
BH CV ECHO MEAS - AO V2 MAX: 453 CM/SEC
BH CV ECHO MEAS - AO V2 MEAN: 341 CM/SEC
BH CV ECHO MEAS - AO V2 VTI: 115 CM
BH CV ECHO MEAS - AVA(I,A): 0.54 CM^2
BH CV ECHO MEAS - AVA(I,D): 0.54 CM^2
BH CV ECHO MEAS - BSA(HAYCOCK): 2.6 M^2
BH CV ECHO MEAS - BSA: 2.4 M^2
BH CV ECHO MEAS - BZI_BMI: 43.9 KILOGRAMS/M^2
BH CV ECHO MEAS - BZI_METRIC_HEIGHT: 175.3 CM
BH CV ECHO MEAS - BZI_METRIC_WEIGHT: 134.7 KG
BH CV ECHO MEAS - EDV(CUBED): 148.9 ML
BH CV ECHO MEAS - EDV(MOD-SP2): 124 ML
BH CV ECHO MEAS - EDV(MOD-SP4): 112 ML
BH CV ECHO MEAS - EDV(TEICH): 135.3 ML
BH CV ECHO MEAS - EF(CUBED): 68.7 %
BH CV ECHO MEAS - EF(MOD-BP): 53 %
BH CV ECHO MEAS - EF(MOD-SP2): 54 %
BH CV ECHO MEAS - EF(MOD-SP4): 53.6 %
BH CV ECHO MEAS - EF(TEICH): 59.8 %
BH CV ECHO MEAS - ESV(CUBED): 46.7 ML
BH CV ECHO MEAS - ESV(MOD-SP2): 57 ML
BH CV ECHO MEAS - ESV(MOD-SP4): 52 ML
BH CV ECHO MEAS - ESV(TEICH): 54.4 ML
BH CV ECHO MEAS - FS: 32.1 %
BH CV ECHO MEAS - IVS/LVPW: 1
BH CV ECHO MEAS - IVSD: 0.9 CM
BH CV ECHO MEAS - LA DIMENSION: 3.6 CM
BH CV ECHO MEAS - LA/AO: 1.1
BH CV ECHO MEAS - LAT PEAK E' VEL: 7 CM/SEC
BH CV ECHO MEAS - LV DIASTOLIC VOL/BSA (35-75): 45.8 ML/M^2
BH CV ECHO MEAS - LV MASS(C)D: 174.5 GRAMS
BH CV ECHO MEAS - LV MASS(C)DI: 71.4 GRAMS/M^2
BH CV ECHO MEAS - LV MEAN PG: 2 MMHG
BH CV ECHO MEAS - LV SYSTOLIC VOL/BSA (12-30): 21.3 ML/M^2
BH CV ECHO MEAS - LV V1 MAX: 94 CM/SEC
BH CV ECHO MEAS - LV V1 MEAN: 61.4 CM/SEC
BH CV ECHO MEAS - LV V1 VTI: 22.1 CM
BH CV ECHO MEAS - LVIDD: 5.3 CM
BH CV ECHO MEAS - LVIDS: 3.6 CM
BH CV ECHO MEAS - LVLD AP2: 9.6 CM
BH CV ECHO MEAS - LVLD AP4: 9.3 CM
BH CV ECHO MEAS - LVLS AP2: 8.4 CM
BH CV ECHO MEAS - LVLS AP4: 8.6 CM
BH CV ECHO MEAS - LVOT AREA (M): 2.8 CM^2
BH CV ECHO MEAS - LVOT AREA: 2.8 CM^2
BH CV ECHO MEAS - LVOT DIAM: 1.9 CM
BH CV ECHO MEAS - LVPWD: 0.9 CM
BH CV ECHO MEAS - MED PEAK E' VEL: 5 CM/SEC
BH CV ECHO MEAS - MR MAX PG: 88.7 MMHG
BH CV ECHO MEAS - MR MAX VEL: 471 CM/SEC
BH CV ECHO MEAS - MV A DUR: 0.18 SEC
BH CV ECHO MEAS - MV A MAX VEL: 114 CM/SEC
BH CV ECHO MEAS - MV DEC SLOPE: 333 CM/SEC^2
BH CV ECHO MEAS - MV DEC TIME: 0.23 SEC
BH CV ECHO MEAS - MV E MAX VEL: 77.5 CM/SEC
BH CV ECHO MEAS - MV E/A: 0.68
BH CV ECHO MEAS - MV MEAN PG: 2 MMHG
BH CV ECHO MEAS - MV P1/2T MAX VEL: 80.6 CM/SEC
BH CV ECHO MEAS - MV P1/2T: 70.9 MSEC
BH CV ECHO MEAS - MV V2 MEAN: 69.9 CM/SEC
BH CV ECHO MEAS - MV V2 VTI: 26.3 CM
BH CV ECHO MEAS - MVA P1/2T LCG: 2.7 CM^2
BH CV ECHO MEAS - MVA(P1/2T): 3.1 CM^2
BH CV ECHO MEAS - MVA(VTI): 2.4 CM^2
BH CV ECHO MEAS - PA ACC SLOPE: 1403 CM/SEC^2
BH CV ECHO MEAS - PA ACC TIME: 0.09 SEC
BH CV ECHO MEAS - PA MAX PG: 7.2 MMHG
BH CV ECHO MEAS - PA PR(ACCEL): 39.4 MMHG
BH CV ECHO MEAS - PA V2 MAX: 134 CM/SEC
BH CV ECHO MEAS - PULM A REVS DUR: 0.17 SEC
BH CV ECHO MEAS - PULM A REVS VEL: 40.3 CM/SEC
BH CV ECHO MEAS - PULM DIAS VEL: 28 CM/SEC
BH CV ECHO MEAS - PULM S/D: 3.1
BH CV ECHO MEAS - PULM SYS VEL: 87 CM/SEC
BH CV ECHO MEAS - QP/QS: 1.4
BH CV ECHO MEAS - RV MEAN PG: 1 MMHG
BH CV ECHO MEAS - RV V1 MEAN: 42.1 CM/SEC
BH CV ECHO MEAS - RV V1 VTI: 15.2 CM
BH CV ECHO MEAS - RVOT AREA: 5.7 CM^2
BH CV ECHO MEAS - RVOT DIAM: 2.7 CM
BH CV ECHO MEAS - SI(AO): 427.3 ML/M^2
BH CV ECHO MEAS - SI(CUBED): 41.8 ML/M^2
BH CV ECHO MEAS - SI(LVOT): 25.6 ML/M^2
BH CV ECHO MEAS - SI(MOD-SP2): 27.4 ML/M^2
BH CV ECHO MEAS - SI(MOD-SP4): 24.6 ML/M^2
BH CV ECHO MEAS - SI(TEICH): 33.1 ML/M^2
BH CV ECHO MEAS - SV(AO): 1044 ML
BH CV ECHO MEAS - SV(CUBED): 102.2 ML
BH CV ECHO MEAS - SV(LVOT): 62.5 ML
BH CV ECHO MEAS - SV(MOD-SP2): 67 ML
BH CV ECHO MEAS - SV(MOD-SP4): 60 ML
BH CV ECHO MEAS - SV(RVOT): 87 ML
BH CV ECHO MEAS - SV(TEICH): 80.9 ML
BH CV ECHO MEAS - TAPSE (>1.6): 2.8 CM2
BH CV ECHO MEAS - TR MAX VEL: 314 CM/SEC
BH CV ECHO MEASUREMENTS AVERAGE E/E' RATIO: 12.92
BH CV VAS BP LEFT ARM: NORMAL MMHG
BH CV XLRA - RV BASE: 3.6 CM
BH CV XLRA - TDI S': 13 CM/SEC
GLUCOSE BLDC GLUCOMTR-MCNC: 148 MG/DL (ref 70–130)
GLUCOSE BLDC GLUCOMTR-MCNC: 149 MG/DL (ref 70–130)
LEFT ATRIUM VOLUME INDEX: 43 ML/M2
LEFT ATRIUM VOLUME: 95 CM3
MAXIMAL PREDICTED HEART RATE: 145 BPM
STRESS TARGET HR: 123 BPM

## 2019-03-21 PROCEDURE — 25010000002 HEPARIN (PORCINE) PER 1000 UNITS: Performed by: INTERNAL MEDICINE

## 2019-03-21 PROCEDURE — 25010000002 DIPHENHYDRAMINE PER 50 MG: Performed by: INTERNAL MEDICINE

## 2019-03-21 PROCEDURE — 82962 GLUCOSE BLOOD TEST: CPT

## 2019-03-21 PROCEDURE — 93306 TTE W/DOPPLER COMPLETE: CPT | Performed by: INTERNAL MEDICINE

## 2019-03-21 PROCEDURE — 93454 CORONARY ARTERY ANGIO S&I: CPT | Performed by: INTERNAL MEDICINE

## 2019-03-21 PROCEDURE — 99153 MOD SED SAME PHYS/QHP EA: CPT | Performed by: INTERNAL MEDICINE

## 2019-03-21 PROCEDURE — 99152 MOD SED SAME PHYS/QHP 5/>YRS: CPT | Performed by: INTERNAL MEDICINE

## 2019-03-21 PROCEDURE — 99204 OFFICE O/P NEW MOD 45 MIN: CPT | Performed by: NURSE PRACTITIONER

## 2019-03-21 PROCEDURE — 0 IOPAMIDOL PER 1 ML: Performed by: INTERNAL MEDICINE

## 2019-03-21 PROCEDURE — C1894 INTRO/SHEATH, NON-LASER: HCPCS | Performed by: INTERNAL MEDICINE

## 2019-03-21 PROCEDURE — 93306 TTE W/DOPPLER COMPLETE: CPT

## 2019-03-21 PROCEDURE — 25010000002 MIDAZOLAM PER 1 MG: Performed by: INTERNAL MEDICINE

## 2019-03-21 PROCEDURE — 25010000002 FENTANYL CITRATE (PF) 100 MCG/2ML SOLUTION: Performed by: INTERNAL MEDICINE

## 2019-03-21 PROCEDURE — C1769 GUIDE WIRE: HCPCS | Performed by: INTERNAL MEDICINE

## 2019-03-21 RX ORDER — SODIUM CHLORIDE 9 MG/ML
75 INJECTION, SOLUTION INTRAVENOUS CONTINUOUS
Status: ACTIVE | OUTPATIENT
Start: 2019-03-21 | End: 2019-03-21

## 2019-03-21 RX ORDER — DIPHENHYDRAMINE HYDROCHLORIDE 50 MG/ML
25 INJECTION INTRAMUSCULAR; INTRAVENOUS ONCE
Status: COMPLETED | OUTPATIENT
Start: 2019-03-21 | End: 2019-03-21

## 2019-03-21 RX ORDER — LIDOCAINE HYDROCHLORIDE 20 MG/ML
INJECTION, SOLUTION INFILTRATION; PERINEURAL AS NEEDED
Status: DISCONTINUED | OUTPATIENT
Start: 2019-03-21 | End: 2019-03-21 | Stop reason: HOSPADM

## 2019-03-21 RX ORDER — MIDAZOLAM HYDROCHLORIDE 1 MG/ML
INJECTION INTRAMUSCULAR; INTRAVENOUS AS NEEDED
Status: DISCONTINUED | OUTPATIENT
Start: 2019-03-21 | End: 2019-03-21 | Stop reason: HOSPADM

## 2019-03-21 RX ORDER — CHLORHEXIDINE GLUCONATE 500 MG/1
1 CLOTH TOPICAL EVERY 12 HOURS PRN
Status: CANCELLED | OUTPATIENT
Start: 2019-03-21

## 2019-03-21 RX ORDER — CHLORHEXIDINE GLUCONATE 0.12 MG/ML
15 RINSE ORAL ONCE
Status: CANCELLED | OUTPATIENT
Start: 2019-03-26 | End: 2019-03-21

## 2019-03-21 RX ORDER — HEPARIN SODIUM 1000 [USP'U]/ML
INJECTION, SOLUTION INTRAVENOUS; SUBCUTANEOUS AS NEEDED
Status: DISCONTINUED | OUTPATIENT
Start: 2019-03-21 | End: 2019-03-21 | Stop reason: HOSPADM

## 2019-03-21 RX ORDER — FENTANYL CITRATE 50 UG/ML
INJECTION, SOLUTION INTRAMUSCULAR; INTRAVENOUS AS NEEDED
Status: DISCONTINUED | OUTPATIENT
Start: 2019-03-21 | End: 2019-03-21 | Stop reason: HOSPADM

## 2019-03-21 RX ORDER — CHLORHEXIDINE GLUCONATE 0.12 MG/ML
15 RINSE ORAL EVERY 12 HOURS
Status: CANCELLED | OUTPATIENT
Start: 2019-03-21 | End: 2019-03-22

## 2019-03-21 RX ORDER — SODIUM CHLORIDE 9 MG/ML
75 INJECTION, SOLUTION INTRAVENOUS CONTINUOUS
Status: DISCONTINUED | OUTPATIENT
Start: 2019-03-21 | End: 2019-03-21 | Stop reason: HOSPADM

## 2019-03-21 RX ORDER — SODIUM CHLORIDE 0.9 % (FLUSH) 0.9 %
3 SYRINGE (ML) INJECTION EVERY 12 HOURS SCHEDULED
Status: DISCONTINUED | OUTPATIENT
Start: 2019-03-21 | End: 2019-03-21 | Stop reason: HOSPADM

## 2019-03-21 RX ORDER — FAMOTIDINE 10 MG/ML
20 INJECTION, SOLUTION INTRAVENOUS ONCE
Status: COMPLETED | OUTPATIENT
Start: 2019-03-21 | End: 2019-03-21

## 2019-03-21 RX ORDER — CEFAZOLIN SODIUM IN 0.9 % NACL 3 G/100 ML
2 INTRAVENOUS SOLUTION, PIGGYBACK (ML) INTRAVENOUS
Status: CANCELLED | OUTPATIENT
Start: 2019-03-26 | End: 2019-03-27

## 2019-03-21 RX ORDER — LIDOCAINE HYDROCHLORIDE 10 MG/ML
0.1 INJECTION, SOLUTION EPIDURAL; INFILTRATION; INTRACAUDAL; PERINEURAL ONCE AS NEEDED
Status: DISCONTINUED | OUTPATIENT
Start: 2019-03-21 | End: 2019-03-21 | Stop reason: HOSPADM

## 2019-03-21 RX ORDER — CHLORHEXIDINE GLUCONATE 500 MG/1
1 CLOTH TOPICAL EVERY 12 HOURS PRN
Status: CANCELLED | OUTPATIENT
Start: 2019-03-26

## 2019-03-21 RX ORDER — VERAPAMIL HYDROCHLORIDE 2.5 MG/ML
INJECTION, SOLUTION INTRAVENOUS AS NEEDED
Status: DISCONTINUED | OUTPATIENT
Start: 2019-03-21 | End: 2019-03-21 | Stop reason: HOSPADM

## 2019-03-21 RX ORDER — SODIUM CHLORIDE 0.9 % (FLUSH) 0.9 %
3-10 SYRINGE (ML) INJECTION AS NEEDED
Status: DISCONTINUED | OUTPATIENT
Start: 2019-03-21 | End: 2019-03-21 | Stop reason: HOSPADM

## 2019-03-21 RX ADMIN — FAMOTIDINE 20 MG: 10 INJECTION, SOLUTION INTRAVENOUS at 10:59

## 2019-03-21 RX ADMIN — SODIUM CHLORIDE 75 ML/HR: 9 INJECTION, SOLUTION INTRAVENOUS at 07:38

## 2019-03-21 RX ADMIN — DIPHENHYDRAMINE HYDROCHLORIDE 25 MG: 50 INJECTION, SOLUTION INTRAMUSCULAR; INTRAVENOUS at 10:59

## 2019-03-21 NOTE — PERIOPERATIVE NURSING NOTE
Call to Juliann Almonte RN with TAVR team per Dr. Braxton's request. She will come over and speak with patient and care giver.

## 2019-03-21 NOTE — DISCHARGE INSTRUCTIONS
[facility]  [address] [place] [address]    Coronary Angiogram (Radial/Ulnar Approach) After Care    Refer to this sheet in the next few weeks. These instructions provide you with information on caring for yourself after your procedure. Your caregiver may also give you more specific instructions. Your treatment has been planned according to current medical practices, but problems sometimes occur. Call your caregiver if you have any problems or questions after your procedure.    Home Care Instructions:  · You may shower the day after the procedure. Remove the bandage (dressing) and gently wash the site with plain soap and water. Gently pat the site dry. You may apply a band aid daily for 2 days if desired.    · Do not apply powder or lotion to the site.  · Do not submerge the affected site in water for 3 to 5 days or until the site is completely healed.   · Do not lift, push or pull anything over 10 pounds for 2 days after your procedure.  · Inspect the site at least twice daily. You may notice some bruising at the site and it may be tender for 1 to 2 weeks.     · Increase your fluid intake for the next 2 days.    · Keep arm elevated for 24 hours. For the remainder of the day, keep your arm in “Pledge of Allegiance” position when up and about.     · You may drive 24 hours after the procedure unless otherwise instructed by your caregiver.  · Do not operate machinery or power tools for 24 hours.  · A responsible adult should be with you for the first 24 hours after you arrive home. Do not make any important legal decisions or sign legal papers for 24 hours.  Do not drink alcohol for 24 hours.    · Metformin or any medications containing Metformin should not be taken for 48 hours after your procedure.      Call Your Doctor if:   · You have unusual pain at the radial/ulnar (wrist) site.  · You have redness, warmth, swelling, or pain at the radial/ulnar (wrist) site.  · You have drainage (other  than a small amount of blood on the dressing).  · You have chills or a fever > 101.  · Your arm becomes pale or dark, cool, tingly, or numb.  · You have heavy bleeding from the site, hold pressure on the site for 20 minutes.  If the bleeding stops, apply a fresh bandage and call your cardiologist.  However, if you continue to have bleeding, call 911.                Moderate Conscious Sedation, Adult, Care After  Refer to this sheet in the next few weeks. These instructions provide you with information on caring for yourself after your procedure. Your health care provider may also give you more specific instructions. Your treatment has been planned according to current medical practices, but problems sometimes occur. Call your health care provider if you have any problems or questions after your procedure.  WHAT TO EXPECT AFTER THE PROCEDURE   After your procedure:  · You may feel sleepy, clumsy, and have poor balance for several hours.  · Vomiting may occur if you eat too soon after the procedure.  HOME CARE INSTRUCTIONS  · Do not participate in any activities where you could become injured for at least 24 hours. Do not:    Drive.    Swim.    Ride a bicycle.    Operate heavy machinery.    Cook.    Use power tools.    Climb ladders.    Work from a high place.  · Do not make important decisions or sign legal documents until you are improved.  · If you vomit, drink water, juice, or soup when you can drink without vomiting. Make sure you have little or no nausea before eating solid foods.  · Only take over-the-counter or prescription medicines for pain, discomfort, or fever as directed by your health care provider.  · Make sure you and your family fully understand everything about the medicines given to you, including what side effects may occur.  · You should not drink alcohol, take sleeping pills, or take medicines that cause drowsiness for at least 24 hours.  · If you smoke, do not smoke without supervision.  · If  you are feeling better, you may resume normal activities 24 hours after you were sedated.  · Keep all appointments with your health care provider.  · You should have a responsible adult stay with you for the first 24 hours post procedure.  SEEK MEDICAL CARE IF:  · Your skin is pale or bluish in color.  · You continue to feel nauseous or vomit.  · Your pain is getting worse and is not helped by medicine.  · You have bleeding or swelling.  · You are still sleepy or feeling clumsy after 24 hours.  SEEK IMMEDIATE MEDICAL CARE IF:  · You develop a rash.  · You have difficulty breathing.  · You develop any type of allergic problem.  · You have a fever.  MAKE SURE YOU:  · Understand these instructions.  · Will watch your condition.  · Will get help right away if you are not doing well or get worse.     This information is not intended to replace advice given to you by your health care provider. Make sure you discuss any questions you have with your health care provider.     Document Released: 10/08/2014 Document Revised: 01/08/2016 Document Reviewed: 10/08/2014  [employer] Interactive Patient Education ©2016 [employer]

## 2019-03-21 NOTE — PERIOPERATIVE NURSING NOTE
Spoke with ECHO tech. They will come do ECHO at bedside. They are aware that it needs to be done prior to D/C today.
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
## 2019-03-22 ENCOUNTER — TELEPHONE (OUTPATIENT)
Dept: CARDIAC SURGERY | Facility: CLINIC | Age: 74
End: 2019-03-22

## 2019-03-22 PROBLEM — I35.0 AORTIC VALVE STENOSIS: Status: ACTIVE | Noted: 2019-03-22

## 2019-03-22 PROBLEM — I25.10 CORONARY ARTERY DISEASE INVOLVING NATIVE CORONARY ARTERY OF NATIVE HEART: Status: ACTIVE | Noted: 2019-03-22

## 2019-03-22 NOTE — TELEPHONE ENCOUNTER
Spoke to patient and patients daughter Alexandra Gallegos with all the information for PAT and Surgery times. He is to arrive at \[facility]\"" on 3- by 0700 for carotids and vein mapping with all other testing to follow. His arrival time as of right now is 6 am on 3- and his case is a to follow case. Explained that this times might change if there is a change of surgeries on that day. Per Jessica he is to stop his FISH OIL and ASA as of today.  They both expressed a verbal understanding of this. They were instructed to call the office up until 4:30 today if they had any further questions.

## 2019-03-23 RX ORDER — FENTANYL CITRATE 50 UG/ML
INJECTION, SOLUTION INTRAMUSCULAR; INTRAVENOUS
Status: DISPENSED
Start: 2019-03-23 | End: 2019-03-23

## 2019-03-23 RX ORDER — LIDOCAINE HYDROCHLORIDE 40 MG/ML
SOLUTION TOPICAL
Status: DISPENSED
Start: 2019-03-23 | End: 2019-03-23

## 2019-03-25 ENCOUNTER — ANESTHESIA EVENT (OUTPATIENT)
Dept: PERIOP | Facility: HOSPITAL | Age: 74
End: 2019-03-25

## 2019-03-25 ENCOUNTER — HOSPITAL ENCOUNTER (OUTPATIENT)
Dept: CARDIOLOGY | Facility: HOSPITAL | Age: 74
Discharge: HOME | End: 2019-03-25

## 2019-03-25 ENCOUNTER — HOSPITAL ENCOUNTER (OUTPATIENT)
Dept: CARDIOLOGY | Facility: HOSPITAL | Age: 74
Discharge: HOME | End: 2019-03-25
Admitting: THORACIC SURGERY (CARDIOTHORACIC VASCULAR SURGERY)

## 2019-03-25 ENCOUNTER — HOSPITAL ENCOUNTER (OUTPATIENT)
Dept: RESPIRATORY THERAPY | Facility: HOSPITAL | Age: 74
Discharge: HOME | End: 2019-03-25

## 2019-03-25 ENCOUNTER — HOSPITAL ENCOUNTER (OUTPATIENT)
Dept: GENERAL RADIOLOGY | Facility: HOSPITAL | Age: 74
Discharge: HOME | End: 2019-03-25

## 2019-03-25 ENCOUNTER — APPOINTMENT (OUTPATIENT)
Dept: PREADMISSION TESTING | Facility: HOSPITAL | Age: 74
End: 2019-03-25

## 2019-03-25 VITALS
RESPIRATION RATE: 24 BRPM | TEMPERATURE: 98 F | HEIGHT: 68 IN | BODY MASS INDEX: 44.85 KG/M2 | WEIGHT: 295.9 LBS | OXYGEN SATURATION: 94 % | DIASTOLIC BLOOD PRESSURE: 84 MMHG | HEART RATE: 76 BPM | SYSTOLIC BLOOD PRESSURE: 148 MMHG

## 2019-03-25 DIAGNOSIS — I11.0 HYPERTENSIVE HEART DISEASE WITH HEART FAILURE (HCC): ICD-10-CM

## 2019-03-25 DIAGNOSIS — I35.0 AORTIC VALVE STENOSIS, ETIOLOGY OF CARDIAC VALVE DISEASE UNSPECIFIED: ICD-10-CM

## 2019-03-25 DIAGNOSIS — I70.90 ATHEROSCLEROSIS: ICD-10-CM

## 2019-03-25 DIAGNOSIS — I25.10 CORONARY ARTERY DISEASE INVOLVING NATIVE CORONARY ARTERY OF NATIVE HEART, ANGINA PRESENCE UNSPECIFIED: ICD-10-CM

## 2019-03-25 DIAGNOSIS — I50.33 ACUTE ON CHRONIC DIASTOLIC CHF (CONGESTIVE HEART FAILURE) (HCC): ICD-10-CM

## 2019-03-25 DIAGNOSIS — I35.0 AORTIC STENOSIS, SEVERE: ICD-10-CM

## 2019-03-25 DIAGNOSIS — E11.40 TYPE 2 DIABETES MELLITUS WITH DIABETIC NEUROPATHY (HCC): ICD-10-CM

## 2019-03-25 DIAGNOSIS — R79.1 ABNORMAL COAGULATION PROFILE: ICD-10-CM

## 2019-03-25 DIAGNOSIS — R09.89 CAROTID BRUIT, UNSPECIFIED LATERALITY: ICD-10-CM

## 2019-03-25 LAB
ABO GROUP BLD: NORMAL
ALBUMIN SERPL-MCNC: 4.2 G/DL (ref 3.5–5.2)
ALBUMIN/GLOB SERPL: 1.5 G/DL
ALP SERPL-CCNC: 68 U/L (ref 39–117)
ALT SERPL W P-5'-P-CCNC: 34 U/L (ref 1–41)
ANION GAP SERPL CALCULATED.3IONS-SCNC: 14.8 MMOL/L
APTT PPP: 32 SECONDS (ref 22.7–35.4)
ARTERIAL PATENCY WRIST A: ABNORMAL
ARTICHOKE IGE QN: 53 MG/DL (ref 0–100)
AST SERPL-CCNC: 23 U/L (ref 1–40)
ATMOSPHERIC PRESS: 750.5 MMHG
BASE EXCESS BLDA CALC-SCNC: 2 MMOL/L (ref 0–2)
BASOPHILS # BLD AUTO: 0.05 10*3/MM3 (ref 0–0.2)
BASOPHILS NFR BLD AUTO: 0.6 % (ref 0–1.5)
BDY SITE: ABNORMAL
BH CV XLRA MEAS - DIST GSV CALF DIST LEFT: 0.31 CM
BH CV XLRA MEAS - DIST GSV CALF DIST RIGHT: 0.27 CM
BH CV XLRA MEAS - DIST GSV THIGH DIST LEFT: 0.45 CM
BH CV XLRA MEAS - DIST GSV THIGH DIST RIGHT: 0.32 CM
BH CV XLRA MEAS - GSV ANKLE DIST LEFT: 0.3 CM
BH CV XLRA MEAS - GSV ANKLE DIST RIGHT: 0.27 CM
BH CV XLRA MEAS - GSV KNEE DIST LEFT: 0.35 CM
BH CV XLRA MEAS - GSV KNEE DIST RIGHT: 0.36 CM
BH CV XLRA MEAS - GSV ORIGIN DIST LEFT: 0.36 CM
BH CV XLRA MEAS - GSV ORIGIN DIST RIGHT: 0.42 CM
BH CV XLRA MEAS - MID GSV CALF LEFT: 0.33 CM
BH CV XLRA MEAS - MID GSV CALF RIGHT: 0.34 CM
BH CV XLRA MEAS - MID GSV THIGH  LEFT: 0.39 CM
BH CV XLRA MEAS - MID GSV THIGH  RIGHT: 0.36 CM
BH CV XLRA MEAS - PROX GSV CALF DIST LEFT: 0.36 CM
BH CV XLRA MEAS - PROX GSV CALF DIST RIGHT: 0.28 CM
BH CV XLRA MEAS - PROX GSV THIGH  LEFT: 0.39 CM
BH CV XLRA MEAS - PROX GSV THIGH  RIGHT: 0.36 CM
BH CV XLRA MEAS LEFT CCA RATIO VEL: -76.2 CM/SEC
BH CV XLRA MEAS LEFT DIST CCA EDV: -21.1 CM/SEC
BH CV XLRA MEAS LEFT DIST CCA PSV: -76.2 CM/SEC
BH CV XLRA MEAS LEFT DIST ICA EDV: -27.6 CM/SEC
BH CV XLRA MEAS LEFT DIST ICA PSV: -80.3 CM/SEC
BH CV XLRA MEAS LEFT ICA RATIO VEL: -91.5 CM/SEC
BH CV XLRA MEAS LEFT ICA/CCA RATIO: 1.2
BH CV XLRA MEAS LEFT MID ICA EDV: -19.9 CM/SEC
BH CV XLRA MEAS LEFT MID ICA PSV: -91.5 CM/SEC
BH CV XLRA MEAS LEFT PROX CCA EDV: 22 CM/SEC
BH CV XLRA MEAS LEFT PROX CCA PSV: 125 CM/SEC
BH CV XLRA MEAS LEFT PROX ECA EDV: -19.3 CM/SEC
BH CV XLRA MEAS LEFT PROX ECA PSV: -120 CM/SEC
BH CV XLRA MEAS LEFT PROX ICA EDV: -19.2 CM/SEC
BH CV XLRA MEAS LEFT PROX ICA PSV: -73.9 CM/SEC
BH CV XLRA MEAS LEFT PROX SCLA PSV: 105 CM/SEC
BH CV XLRA MEAS LEFT VERTEBRAL A EDV: 8.6 CM/SEC
BH CV XLRA MEAS LEFT VERTEBRAL A PSV: 28.6 CM/SEC
BH CV XLRA MEAS RIGHT CCA RATIO VEL: -72.1 CM/SEC
BH CV XLRA MEAS RIGHT DIST CCA EDV: -20.5 CM/SEC
BH CV XLRA MEAS RIGHT DIST CCA PSV: -72.1 CM/SEC
BH CV XLRA MEAS RIGHT DIST ICA EDV: -21.6 CM/SEC
BH CV XLRA MEAS RIGHT DIST ICA PSV: -54.2 CM/SEC
BH CV XLRA MEAS RIGHT ICA RATIO VEL: -105 CM/SEC
BH CV XLRA MEAS RIGHT ICA/CCA RATIO: 1.5
BH CV XLRA MEAS RIGHT MID ICA EDV: -25.8 CM/SEC
BH CV XLRA MEAS RIGHT MID ICA PSV: -105 CM/SEC
BH CV XLRA MEAS RIGHT PROX CCA EDV: 18.8 CM/SEC
BH CV XLRA MEAS RIGHT PROX CCA PSV: 73.9 CM/SEC
BH CV XLRA MEAS RIGHT PROX ECA EDV: -22.3 CM/SEC
BH CV XLRA MEAS RIGHT PROX ECA PSV: -104 CM/SEC
BH CV XLRA MEAS RIGHT PROX ICA EDV: -24.6 CM/SEC
BH CV XLRA MEAS RIGHT PROX ICA PSV: -71.5 CM/SEC
BH CV XLRA MEAS RIGHT PROX SCLA PSV: 138 CM/SEC
BH CV XLRA MEAS RIGHT VERTEBRAL A EDV: 11.8 CM/SEC
BH CV XLRA MEAS RIGHT VERTEBRAL A PSV: 40.1 CM/SEC
BILIRUB SERPL-MCNC: 0.4 MG/DL (ref 0.2–1.2)
BILIRUB UR QL STRIP: NEGATIVE
BLD GP AB SCN SERPL QL: NEGATIVE
BUN BLD-MCNC: 15 MG/DL (ref 8–23)
BUN/CREAT SERPL: 15 (ref 7–25)
CALCIUM SPEC-SCNC: 9.7 MG/DL (ref 8.6–10.5)
CHLORIDE SERPL-SCNC: 98 MMOL/L (ref 98–107)
CHOLEST SERPL-MCNC: 127 MG/DL (ref 0–200)
CLARITY UR: CLEAR
CLOSE TME COLL+ADP + EPINEP PNL BLD: 99 % (ref 86–100)
CO2 SERPL-SCNC: 25.2 MMOL/L (ref 22–29)
COLOR UR: YELLOW
CREAT BLD-MCNC: 1 MG/DL (ref 0.76–1.27)
DEPRECATED RDW RBC AUTO: 45.7 FL (ref 37–54)
EOSINOPHIL # BLD AUTO: 0.56 10*3/MM3 (ref 0–0.4)
EOSINOPHIL NFR BLD AUTO: 6.8 % (ref 0.3–6.2)
ERYTHROCYTE [DISTWIDTH] IN BLOOD BY AUTOMATED COUNT: 14.2 % (ref 12.3–15.4)
GFR SERPL CREATININE-BSD FRML MDRD: 73 ML/MIN/1.73
GLOBULIN UR ELPH-MCNC: 2.8 GM/DL
GLUCOSE BLD-MCNC: 200 MG/DL (ref 65–99)
GLUCOSE UR STRIP-MCNC: NEGATIVE MG/DL
HBA1C MFR BLD: 7.3 % (ref 4.8–5.6)
HCO3 BLDA-SCNC: 26.8 MMOL/L (ref 22–28)
HCT VFR BLD AUTO: 43.7 % (ref 37.5–51)
HDLC SERPL-MCNC: 23 MG/DL (ref 40–60)
HGB BLD-MCNC: 14.4 G/DL (ref 13–17.7)
HGB UR QL STRIP.AUTO: NEGATIVE
IMM GRANULOCYTES # BLD AUTO: 0.03 10*3/MM3 (ref 0–0.05)
IMM GRANULOCYTES NFR BLD AUTO: 0.4 % (ref 0–0.5)
INR PPP: 0.98 (ref 0.9–1.1)
KETONES UR QL STRIP: NEGATIVE
LDLC SERPL CALC-MCNC: ABNORMAL MG/DL (ref 0–100)
LDLC/HDLC SERPL: ABNORMAL {RATIO}
LEFT ARM BP: NORMAL MMHG
LEUKOCYTE ESTERASE UR QL STRIP.AUTO: NEGATIVE
LYMPHOCYTES # BLD AUTO: 2.01 10*3/MM3 (ref 0.7–3.1)
LYMPHOCYTES NFR BLD AUTO: 24.5 % (ref 19.6–45.3)
MCH RBC QN AUTO: 29.6 PG (ref 26.6–33)
MCHC RBC AUTO-ENTMCNC: 33 G/DL (ref 31.5–35.7)
MCV RBC AUTO: 89.7 FL (ref 79–97)
MODALITY: ABNORMAL
MONOCYTES # BLD AUTO: 0.66 10*3/MM3 (ref 0.1–0.9)
MONOCYTES NFR BLD AUTO: 8 % (ref 5–12)
NEUTROPHILS # BLD AUTO: 4.89 10*3/MM3 (ref 1.4–7)
NEUTROPHILS NFR BLD AUTO: 59.7 % (ref 42.7–76)
NITRITE UR QL STRIP: NEGATIVE
NRBC BLD AUTO-RTO: 0 /100 WBC (ref 0–0)
NT-PROBNP SERPL-MCNC: 267.5 PG/ML (ref 5–1800)
PCO2 BLDA: 41.5 MM HG (ref 35–45)
PH BLDA: 7.42 PH UNITS (ref 7.35–7.45)
PH UR STRIP.AUTO: 7 [PH] (ref 5–8)
PLATELET # BLD AUTO: 179 10*3/MM3 (ref 140–450)
PMV BLD AUTO: 10.7 FL (ref 6–12)
PO2 BLDA: 69.4 MM HG (ref 80–100)
POTASSIUM BLD-SCNC: 3.9 MMOL/L (ref 3.5–5.2)
PROT SERPL-MCNC: 7 G/DL (ref 6–8.5)
PROT UR QL STRIP: NEGATIVE
PROTHROMBIN TIME: 12.8 SECONDS (ref 11.7–14.2)
RBC # BLD AUTO: 4.87 10*6/MM3 (ref 4.14–5.8)
RH BLD: NEGATIVE
RIGHT ARM BP: NORMAL MMHG
SAO2 % BLDCOA: 93.9 % (ref 92–99)
SODIUM BLD-SCNC: 138 MMOL/L (ref 136–145)
SP GR UR STRIP: 1.01 (ref 1–1.03)
T&S EXPIRATION DATE: NORMAL
TOTAL RATE: 20 BREATHS/MINUTE
TRIGL SERPL-MCNC: 495 MG/DL (ref 0–150)
UROBILINOGEN UR QL STRIP: NORMAL
VLDLC SERPL-MCNC: ABNORMAL MG/DL (ref 5–40)
WBC NRBC COR # BLD: 8.2 10*3/MM3 (ref 3.4–10.8)

## 2019-03-25 PROCEDURE — 83721 ASSAY OF BLOOD LIPOPROTEIN: CPT | Performed by: NURSE PRACTITIONER

## 2019-03-25 PROCEDURE — 85610 PROTHROMBIN TIME: CPT | Performed by: NURSE PRACTITIONER

## 2019-03-25 PROCEDURE — 93880 EXTRACRANIAL BILAT STUDY: CPT

## 2019-03-25 PROCEDURE — 86901 BLOOD TYPING SEROLOGIC RH(D): CPT | Performed by: NURSE PRACTITIONER

## 2019-03-25 PROCEDURE — 80053 COMPREHEN METABOLIC PANEL: CPT | Performed by: NURSE PRACTITIONER

## 2019-03-25 PROCEDURE — 93970 EXTREMITY STUDY: CPT

## 2019-03-25 PROCEDURE — 86900 BLOOD TYPING SEROLOGIC ABO: CPT | Performed by: NURSE PRACTITIONER

## 2019-03-25 PROCEDURE — 94799 UNLISTED PULMONARY SVC/PX: CPT

## 2019-03-25 PROCEDURE — 86920 COMPATIBILITY TEST SPIN: CPT

## 2019-03-25 PROCEDURE — 83036 HEMOGLOBIN GLYCOSYLATED A1C: CPT | Performed by: NURSE PRACTITIONER

## 2019-03-25 PROCEDURE — 86850 RBC ANTIBODY SCREEN: CPT | Performed by: NURSE PRACTITIONER

## 2019-03-25 PROCEDURE — 36600 WITHDRAWAL OF ARTERIAL BLOOD: CPT

## 2019-03-25 PROCEDURE — 85576 BLOOD PLATELET AGGREGATION: CPT | Performed by: NURSE PRACTITIONER

## 2019-03-25 PROCEDURE — 85025 COMPLETE CBC W/AUTO DIFF WBC: CPT | Performed by: NURSE PRACTITIONER

## 2019-03-25 PROCEDURE — 71046 X-RAY EXAM CHEST 2 VIEWS: CPT

## 2019-03-25 PROCEDURE — 93010 ELECTROCARDIOGRAM REPORT: CPT | Performed by: INTERNAL MEDICINE

## 2019-03-25 PROCEDURE — 81003 URINALYSIS AUTO W/O SCOPE: CPT | Performed by: NURSE PRACTITIONER

## 2019-03-25 PROCEDURE — 82803 BLOOD GASES ANY COMBINATION: CPT

## 2019-03-25 PROCEDURE — 85730 THROMBOPLASTIN TIME PARTIAL: CPT | Performed by: NURSE PRACTITIONER

## 2019-03-25 PROCEDURE — 83880 ASSAY OF NATRIURETIC PEPTIDE: CPT | Performed by: NURSE PRACTITIONER

## 2019-03-25 PROCEDURE — 80061 LIPID PANEL: CPT | Performed by: NURSE PRACTITIONER

## 2019-03-25 PROCEDURE — 36415 COLL VENOUS BLD VENIPUNCTURE: CPT

## 2019-03-25 PROCEDURE — 93005 ELECTROCARDIOGRAM TRACING: CPT

## 2019-03-25 PROCEDURE — 94010 BREATHING CAPACITY TEST: CPT

## 2019-03-25 RX ORDER — GABAPENTIN 300 MG/1
300 CAPSULE ORAL 3 TIMES DAILY
COMMUNITY

## 2019-03-25 RX ORDER — LOSARTAN POTASSIUM AND HYDROCHLOROTHIAZIDE 25; 100 MG/1; MG/1
1 TABLET ORAL DAILY
COMMUNITY
End: 2019-03-30 | Stop reason: HOSPADM

## 2019-03-25 RX ORDER — CHLORHEXIDINE GLUCONATE 0.12 MG/ML
15 RINSE ORAL EVERY 12 HOURS
Status: DISPENSED | OUTPATIENT
Start: 2019-03-25 | End: 2019-03-26

## 2019-03-25 RX ORDER — TAMSULOSIN HYDROCHLORIDE 0.4 MG/1
1 CAPSULE ORAL NIGHTLY
COMMUNITY

## 2019-03-25 RX ORDER — ALBUTEROL SULFATE 2.5 MG/3ML
2.5 SOLUTION RESPIRATORY (INHALATION) ONCE AS NEEDED
Status: DISCONTINUED | OUTPATIENT
Start: 2019-03-25 | End: 2019-03-26 | Stop reason: HOSPADM

## 2019-03-25 RX ORDER — ATORVASTATIN CALCIUM 10 MG/1
10 TABLET, FILM COATED ORAL DAILY
COMMUNITY
End: 2019-11-16 | Stop reason: HOSPADM

## 2019-03-25 NOTE — ANESTHESIA PREPROCEDURE EVALUATION
Anesthesia Evaluation     Patient summary reviewed and Nursing notes reviewed   NPO Solid Status: > 8 hours  NPO Liquid Status: > 2 hours           Airway   Mallampati: III  Possible difficult intubation  Dental - normal exam     Pulmonary     breath sounds clear to auscultation  (+) shortness of breath, sleep apnea on CPAP,   Cardiovascular     ECG reviewed  Rhythm: regular  Rate: normal    (+) hypertension well controlled, valvular problems/murmurs, CAD, COLVIN, murmur,     ROS comment: · Left ventricular systolic function is normal. Calculated EF = 53.0%. Estimated EF was in disagreement with the calculated EF. Estimated EF appears to be in the range of 61 - 65%. Normal left ventricular cavity size noted. All left ventricular wall segments contract normally. Left ventricular wall thickness is consistent with mild concentric hypertrophy. Left ventricular diastolic dysfunction is noted (grade I) consistent with impaired relaxation.  · Left atrial cavity size is moderately dilated.  · Severe aortic valve stenosis is present.       Neuro/Psych  (+) psychiatric history Anxiety and Depression,     GI/Hepatic/Renal/Endo    (+) morbid obesity, GERD,  diabetes mellitus,     Musculoskeletal     Abdominal    Substance History      OB/GYN          Other   (+) arthritis                     Anesthesia Plan    ASA 4     general   (Art line  Central line/SG)  intravenous induction   Anesthetic plan, all risks, benefits, and alternatives have been provided, discussed and informed consent has been obtained with: patient.

## 2019-03-26 ENCOUNTER — HOSPITAL ENCOUNTER (INPATIENT)
Facility: HOSPITAL | Age: 74
LOS: 3 days | Discharge: NURSING, REHAB OR LONG-TERM CARE | End: 2019-03-30
Attending: THORACIC SURGERY (CARDIOTHORACIC VASCULAR SURGERY) | Admitting: THORACIC SURGERY (CARDIOTHORACIC VASCULAR SURGERY)

## 2019-03-26 ENCOUNTER — TELEPHONE (OUTPATIENT)
Dept: SLEEP MEDICINE | Facility: HOSPITAL | Age: 74
End: 2019-03-26

## 2019-03-26 ENCOUNTER — ANCILLARY PROCEDURE (OUTPATIENT)
Dept: PERIOP | Facility: HOSPITAL | Age: 74
End: 2019-03-26

## 2019-03-26 ENCOUNTER — APPOINTMENT (OUTPATIENT)
Dept: GENERAL RADIOLOGY | Facility: HOSPITAL | Age: 74
End: 2019-03-26

## 2019-03-26 ENCOUNTER — ANESTHESIA (OUTPATIENT)
Dept: PERIOP | Facility: HOSPITAL | Age: 74
End: 2019-03-26

## 2019-03-26 DIAGNOSIS — I25.10 CORONARY ARTERY DISEASE INVOLVING NATIVE CORONARY ARTERY OF NATIVE HEART, ANGINA PRESENCE UNSPECIFIED: ICD-10-CM

## 2019-03-26 DIAGNOSIS — Z95.1 S/P CABG X 1: ICD-10-CM

## 2019-03-26 DIAGNOSIS — I25.10 CORONARY ARTERY DISEASE INVOLVING NATIVE CORONARY ARTERY OF NATIVE HEART WITHOUT ANGINA PECTORIS: Primary | ICD-10-CM

## 2019-03-26 DIAGNOSIS — R26.2 DIFFICULTY WALKING: ICD-10-CM

## 2019-03-26 DIAGNOSIS — Z95.2 S/P AVR (AORTIC VALVE REPLACEMENT): ICD-10-CM

## 2019-03-26 DIAGNOSIS — I35.0 AORTIC VALVE STENOSIS, ETIOLOGY OF CARDIAC VALVE DISEASE UNSPECIFIED: ICD-10-CM

## 2019-03-26 LAB
ACT BLD: 131 SECONDS (ref 82–152)
ACT BLD: 147 SECONDS (ref 82–152)
ACT BLD: 356 SECONDS (ref 82–152)
ACT BLD: 417 SECONDS (ref 82–152)
ACT BLD: 643 SECONDS (ref 82–152)
ALBUMIN SERPL-MCNC: 3.9 G/DL (ref 3.5–5.2)
ALBUMIN SERPL-MCNC: 3.9 G/DL (ref 3.5–5.2)
ANION GAP SERPL CALCULATED.3IONS-SCNC: 13.1 MMOL/L
ANION GAP SERPL CALCULATED.3IONS-SCNC: 14.4 MMOL/L
APTT PPP: 34.8 SECONDS (ref 22.7–35.4)
ARTERIAL PATENCY WRIST A: ABNORMAL
ATMOSPHERIC PRESS: 758.5 MMHG
ATMOSPHERIC PRESS: 759.2 MMHG
ATMOSPHERIC PRESS: 759.6 MMHG
BASE EXCESS BLDA CALC-SCNC: -0.5 MMOL/L (ref 0–2)
BASE EXCESS BLDA CALC-SCNC: 0 MMOL/L (ref -5–5)
BASE EXCESS BLDA CALC-SCNC: 0.2 MMOL/L (ref 0–2)
BASE EXCESS BLDA CALC-SCNC: 0.8 MMOL/L (ref 0–2)
BASE EXCESS BLDA CALC-SCNC: 2 MMOL/L (ref -5–5)
BASE EXCESS BLDA CALC-SCNC: 3 MMOL/L (ref -5–5)
BASE EXCESS BLDA CALC-SCNC: 4 MMOL/L (ref -5–5)
BASE EXCESS BLDA CALC-SCNC: 4 MMOL/L (ref -5–5)
BASOPHILS # BLD AUTO: 0.03 10*3/MM3 (ref 0–0.2)
BASOPHILS NFR BLD AUTO: 0.3 % (ref 0–1.5)
BDY SITE: ABNORMAL
BUN BLD-MCNC: 12 MG/DL (ref 8–23)
BUN BLD-MCNC: 13 MG/DL (ref 8–23)
BUN/CREAT SERPL: 11.4 (ref 7–25)
BUN/CREAT SERPL: 11.8 (ref 7–25)
CA-I BLD-MCNC: 5.1 MG/DL (ref 4.6–5.4)
CA-I SERPL ISE-MCNC: 1.27 MMOL/L (ref 1.15–1.35)
CALCIUM SPEC-SCNC: 8.2 MG/DL (ref 8.6–10.5)
CALCIUM SPEC-SCNC: 8.3 MG/DL (ref 8.6–10.5)
CHLORIDE SERPL-SCNC: 102 MMOL/L (ref 98–107)
CHLORIDE SERPL-SCNC: 105 MMOL/L (ref 98–107)
CO2 BLDA-SCNC: 28 MMOL/L (ref 24–29)
CO2 BLDA-SCNC: 31 MMOL/L (ref 24–29)
CO2 BLDA-SCNC: 32 MMOL/L (ref 24–29)
CO2 SERPL-SCNC: 24.6 MMOL/L (ref 22–29)
CO2 SERPL-SCNC: 25.9 MMOL/L (ref 22–29)
CREAT BLD-MCNC: 1.05 MG/DL (ref 0.76–1.27)
CREAT BLD-MCNC: 1.1 MG/DL (ref 0.76–1.27)
DEPRECATED RDW RBC AUTO: 46.6 FL (ref 37–54)
DEPRECATED RDW RBC AUTO: 47.3 FL (ref 37–54)
EOSINOPHIL # BLD AUTO: 0.33 10*3/MM3 (ref 0–0.4)
EOSINOPHIL NFR BLD AUTO: 3.4 % (ref 0.3–6.2)
ERYTHROCYTE [DISTWIDTH] IN BLOOD BY AUTOMATED COUNT: 14.2 % (ref 12.3–15.4)
ERYTHROCYTE [DISTWIDTH] IN BLOOD BY AUTOMATED COUNT: 14.5 % (ref 12.3–15.4)
FIBRINOGEN PPP-MCNC: 280 MG/DL (ref 219–464)
GFR SERPL CREATININE-BSD FRML MDRD: 65 ML/MIN/1.73
GFR SERPL CREATININE-BSD FRML MDRD: 69 ML/MIN/1.73
GLUCOSE BLD-MCNC: 160 MG/DL (ref 65–99)
GLUCOSE BLD-MCNC: 211 MG/DL (ref 65–99)
GLUCOSE BLDC GLUCOMTR-MCNC: 134 MG/DL (ref 70–130)
GLUCOSE BLDC GLUCOMTR-MCNC: 145 MG/DL (ref 70–130)
GLUCOSE BLDC GLUCOMTR-MCNC: 146 MG/DL (ref 70–130)
GLUCOSE BLDC GLUCOMTR-MCNC: 146 MG/DL (ref 70–130)
GLUCOSE BLDC GLUCOMTR-MCNC: 151 MG/DL (ref 70–130)
GLUCOSE BLDC GLUCOMTR-MCNC: 162 MG/DL (ref 70–130)
GLUCOSE BLDC GLUCOMTR-MCNC: 182 MG/DL (ref 70–130)
GLUCOSE BLDC GLUCOMTR-MCNC: 194 MG/DL (ref 70–130)
GLUCOSE BLDC GLUCOMTR-MCNC: 199 MG/DL (ref 70–130)
GLUCOSE BLDC GLUCOMTR-MCNC: 205 MG/DL (ref 70–130)
GLUCOSE BLDC GLUCOMTR-MCNC: 211 MG/DL (ref 70–130)
GLUCOSE BLDC GLUCOMTR-MCNC: 237 MG/DL (ref 70–130)
GLUCOSE BLDC GLUCOMTR-MCNC: 246 MG/DL (ref 70–130)
GLUCOSE BLDC GLUCOMTR-MCNC: 251 MG/DL (ref 70–130)
GLUCOSE BLDC GLUCOMTR-MCNC: 277 MG/DL (ref 70–130)
HCO3 BLDA-SCNC: 26.3 MMOL/L (ref 22–28)
HCO3 BLDA-SCNC: 26.6 MMOL/L (ref 22–26)
HCO3 BLDA-SCNC: 26.6 MMOL/L (ref 22–28)
HCO3 BLDA-SCNC: 27.2 MMOL/L (ref 22–28)
HCO3 BLDA-SCNC: 28.8 MMOL/L (ref 22–26)
HCO3 BLDA-SCNC: 29.2 MMOL/L (ref 22–26)
HCO3 BLDA-SCNC: 29.4 MMOL/L (ref 22–26)
HCO3 BLDA-SCNC: 30 MMOL/L (ref 22–26)
HCT VFR BLD AUTO: 36.2 % (ref 37.5–51)
HCT VFR BLD AUTO: 37.4 % (ref 37.5–51)
HCT VFR BLDA CALC: 29 % (ref 38–51)
HCT VFR BLDA CALC: 29 % (ref 38–51)
HCT VFR BLDA CALC: 30 % (ref 38–51)
HCT VFR BLDA CALC: 31 % (ref 38–51)
HCT VFR BLDA CALC: 37 % (ref 38–51)
HGB BLD-MCNC: 11.7 G/DL (ref 13–17.7)
HGB BLD-MCNC: 12.4 G/DL (ref 13–17.7)
HGB BLDA-MCNC: 10.2 G/DL (ref 12–17)
HGB BLDA-MCNC: 10.5 G/DL (ref 12–17)
HGB BLDA-MCNC: 12.6 G/DL (ref 12–17)
HGB BLDA-MCNC: 9.9 G/DL (ref 12–17)
HGB BLDA-MCNC: 9.9 G/DL (ref 12–17)
HOROWITZ INDEX BLD+IHG-RTO: 100 %
HOROWITZ INDEX BLD+IHG-RTO: 40 %
HOROWITZ INDEX BLD+IHG-RTO: 60 %
IMM GRANULOCYTES # BLD AUTO: 0.07 10*3/MM3 (ref 0–0.05)
IMM GRANULOCYTES NFR BLD AUTO: 0.7 % (ref 0–0.5)
INR PPP: 1.28 (ref 0.9–1.1)
LYMPHOCYTES # BLD AUTO: 1.84 10*3/MM3 (ref 0.7–3.1)
LYMPHOCYTES NFR BLD AUTO: 18.9 % (ref 19.6–45.3)
MAGNESIUM SERPL-MCNC: 2.3 MG/DL (ref 1.6–2.4)
MAGNESIUM SERPL-MCNC: 2.4 MG/DL (ref 1.6–2.4)
MCH RBC QN AUTO: 29.6 PG (ref 26.6–33)
MCH RBC QN AUTO: 29.7 PG (ref 26.6–33)
MCHC RBC AUTO-ENTMCNC: 32.3 G/DL (ref 31.5–35.7)
MCHC RBC AUTO-ENTMCNC: 33.2 G/DL (ref 31.5–35.7)
MCV RBC AUTO: 89.5 FL (ref 79–97)
MCV RBC AUTO: 91.6 FL (ref 79–97)
MODALITY: ABNORMAL
MONOCYTES # BLD AUTO: 0.88 10*3/MM3 (ref 0.1–0.9)
MONOCYTES NFR BLD AUTO: 9.1 % (ref 5–12)
NEUTROPHILS # BLD AUTO: 6.57 10*3/MM3 (ref 1.4–7)
NEUTROPHILS NFR BLD AUTO: 67.6 % (ref 42.7–76)
NRBC BLD AUTO-RTO: 0 /100 WBC (ref 0–0)
O2 A-A PPRESDIFF RESPIRATORY: 0.2 MMHG
O2 A-A PPRESDIFF RESPIRATORY: 0.2 MMHG
O2 A-A PPRESDIFF RESPIRATORY: 0.4 MMHG
PCO2 BLDA: 46.7 MM HG (ref 35–45)
PCO2 BLDA: 48.3 MM HG (ref 35–45)
PCO2 BLDA: 48.9 MM HG (ref 35–45)
PCO2 BLDA: 52.1 MM HG (ref 35–45)
PCO2 BLDA: 54.5 MM HG (ref 35–45)
PCO2 BLDA: 58.5 MM HG (ref 35–45)
PCO2 BLDA: 59.1 MM HG (ref 35–45)
PCO2 BLDA: 62.5 MM HG (ref 35–45)
PEEP RESPIRATORY: 10 CM[H2O]
PEEP RESPIRATORY: 10 CM[H2O]
PEEP RESPIRATORY: 7.5 CM[H2O]
PH BLDA: 7.27 PH UNITS (ref 7.35–7.6)
PH BLDA: 7.28 PH UNITS (ref 7.35–7.45)
PH BLDA: 7.3 PH UNITS (ref 7.35–7.6)
PH BLDA: 7.32 PH UNITS (ref 7.35–7.6)
PH BLDA: 7.34 PH UNITS (ref 7.35–7.45)
PH BLDA: 7.35 PH UNITS (ref 7.35–7.6)
PH BLDA: 7.36 PH UNITS (ref 7.35–7.45)
PH BLDA: 7.38 PH UNITS (ref 7.35–7.6)
PHOSPHATE SERPL-MCNC: 3.8 MG/DL (ref 2.5–4.5)
PHOSPHATE SERPL-MCNC: 4 MG/DL (ref 2.5–4.5)
PLATELET # BLD AUTO: 125 10*3/MM3 (ref 140–450)
PLATELET # BLD AUTO: 128 10*3/MM3 (ref 140–450)
PMV BLD AUTO: 10.8 FL (ref 6–12)
PMV BLD AUTO: 11 FL (ref 6–12)
PO2 BLDA: 103 MMHG (ref 80–105)
PO2 BLDA: 138.2 MM HG (ref 80–100)
PO2 BLDA: 337 MMHG (ref 80–105)
PO2 BLDA: 41 MMHG (ref 80–105)
PO2 BLDA: 436 MMHG (ref 80–105)
PO2 BLDA: 44 MMHG (ref 80–105)
PO2 BLDA: 89.8 MM HG (ref 80–100)
PO2 BLDA: 90.9 MM HG (ref 80–100)
POTASSIUM BLD-SCNC: 3.9 MMOL/L (ref 3.5–5.2)
POTASSIUM BLD-SCNC: 4 MMOL/L (ref 3.5–5.2)
POTASSIUM BLD-SCNC: 4.1 MMOL/L (ref 3.5–5.2)
POTASSIUM BLDA-SCNC: 3.7 MMOL/L (ref 3.5–4.9)
POTASSIUM BLDA-SCNC: 3.8 MMOL/L (ref 3.5–4.9)
POTASSIUM BLDA-SCNC: 4.3 MMOL/L (ref 3.5–4.9)
PROTHROMBIN TIME: 15.8 SECONDS (ref 11.7–14.2)
PSV: 8 CMH2O
RBC # BLD AUTO: 3.95 10*6/MM3 (ref 4.14–5.8)
RBC # BLD AUTO: 4.18 10*6/MM3 (ref 4.14–5.8)
SAO2 % BLDA: 100 % (ref 95–98)
SAO2 % BLDA: 100 % (ref 95–98)
SAO2 % BLDA: 70 % (ref 95–98)
SAO2 % BLDA: 72 % (ref 95–98)
SAO2 % BLDA: 97 % (ref 95–98)
SAO2 % BLDCOA: 96.4 % (ref 92–99)
SAO2 % BLDCOA: 96.5 % (ref 92–99)
SAO2 % BLDCOA: 98.7 % (ref 92–99)
SET MECH RESP RATE: 12
SET MECH RESP RATE: 16
SODIUM BLD-SCNC: 141 MMOL/L (ref 136–145)
SODIUM BLD-SCNC: 144 MMOL/L (ref 136–145)
TOTAL RATE: 12 BREATHS/MINUTE
TOTAL RATE: 14 BREATHS/MINUTE
TOTAL RATE: 16 BREATHS/MINUTE
VENTILATOR MODE: ABNORMAL
VT ON VENT VENT: 650 ML
VT ON VENT VENT: 650 ML
WBC NRBC COR # BLD: 8.91 10*3/MM3 (ref 3.4–10.8)
WBC NRBC COR # BLD: 9.72 10*3/MM3 (ref 3.4–10.8)

## 2019-03-26 PROCEDURE — 25010000002 HEPARIN (PORCINE) PER 1000 UNITS

## 2019-03-26 PROCEDURE — 86901 BLOOD TYPING SEROLOGIC RH(D): CPT

## 2019-03-26 PROCEDURE — 25010000002 MIDAZOLAM PER 1 MG: Performed by: ANESTHESIOLOGY

## 2019-03-26 PROCEDURE — 25010000002 HEPARIN (PORCINE) PER 1000 UNITS: Performed by: THORACIC SURGERY (CARDIOTHORACIC VASCULAR SURGERY)

## 2019-03-26 PROCEDURE — 25010000002 MAGNESIUM SULFATE PER 500 MG OF MAGNESIUM: Performed by: ANESTHESIOLOGY

## 2019-03-26 PROCEDURE — 25010000002 ALBUMIN HUMAN 5% PER 50 ML: Performed by: THORACIC SURGERY (CARDIOTHORACIC VASCULAR SURGERY)

## 2019-03-26 PROCEDURE — 94002 VENT MGMT INPAT INIT DAY: CPT

## 2019-03-26 PROCEDURE — 85730 THROMBOPLASTIN TIME PARTIAL: CPT | Performed by: THORACIC SURGERY (CARDIOTHORACIC VASCULAR SURGERY)

## 2019-03-26 PROCEDURE — 25010000002 CEFAZOLIN PER 500 MG: Performed by: THORACIC SURGERY (CARDIOTHORACIC VASCULAR SURGERY)

## 2019-03-26 PROCEDURE — P9047 ALBUMIN (HUMAN), 25%, 50ML: HCPCS

## 2019-03-26 PROCEDURE — 82330 ASSAY OF CALCIUM: CPT | Performed by: THORACIC SURGERY (CARDIOTHORACIC VASCULAR SURGERY)

## 2019-03-26 PROCEDURE — A4648 IMPLANTABLE TISSUE MARKER: HCPCS | Performed by: THORACIC SURGERY (CARDIOTHORACIC VASCULAR SURGERY)

## 2019-03-26 PROCEDURE — 06BP4ZZ EXCISION OF RIGHT SAPHENOUS VEIN, PERCUTANEOUS ENDOSCOPIC APPROACH: ICD-10-PCS | Performed by: THORACIC SURGERY (CARDIOTHORACIC VASCULAR SURGERY)

## 2019-03-26 PROCEDURE — 25010000002 ONDANSETRON PER 1 MG: Performed by: ANESTHESIOLOGY

## 2019-03-26 PROCEDURE — 3E080GC INTRODUCTION OF OTHER THERAPEUTIC SUBSTANCE INTO HEART, OPEN APPROACH: ICD-10-PCS | Performed by: THORACIC SURGERY (CARDIOTHORACIC VASCULAR SURGERY)

## 2019-03-26 PROCEDURE — 85018 HEMOGLOBIN: CPT

## 2019-03-26 PROCEDURE — C1713 ANCHOR/SCREW BN/BN,TIS/BN: HCPCS | Performed by: THORACIC SURGERY (CARDIOTHORACIC VASCULAR SURGERY)

## 2019-03-26 PROCEDURE — 25010000002 PROTAMINE SULFATE PER 10 MG: Performed by: ANESTHESIOLOGY

## 2019-03-26 PROCEDURE — 25010000002 PAPAVERINE PER 60 MG: Performed by: THORACIC SURGERY (CARDIOTHORACIC VASCULAR SURGERY)

## 2019-03-26 PROCEDURE — 85610 PROTHROMBIN TIME: CPT | Performed by: THORACIC SURGERY (CARDIOTHORACIC VASCULAR SURGERY)

## 2019-03-26 PROCEDURE — 02RF08Z REPLACEMENT OF AORTIC VALVE WITH ZOOPLASTIC TISSUE, OPEN APPROACH: ICD-10-PCS | Performed by: THORACIC SURGERY (CARDIOTHORACIC VASCULAR SURGERY)

## 2019-03-26 PROCEDURE — 82803 BLOOD GASES ANY COMBINATION: CPT

## 2019-03-26 PROCEDURE — 33508 ENDOSCOPIC VEIN HARVEST: CPT | Performed by: THORACIC SURGERY (CARDIOTHORACIC VASCULAR SURGERY)

## 2019-03-26 PROCEDURE — 85027 COMPLETE CBC AUTOMATED: CPT | Performed by: THORACIC SURGERY (CARDIOTHORACIC VASCULAR SURGERY)

## 2019-03-26 PROCEDURE — 71045 X-RAY EXAM CHEST 1 VIEW: CPT

## 2019-03-26 PROCEDURE — 25010000002 HEPARIN (PORCINE) PER 1000 UNITS: Performed by: ANESTHESIOLOGY

## 2019-03-26 PROCEDURE — 84132 ASSAY OF SERUM POTASSIUM: CPT | Performed by: THORACIC SURGERY (CARDIOTHORACIC VASCULAR SURGERY)

## 2019-03-26 PROCEDURE — 33510 CABG VEIN SINGLE: CPT | Performed by: PHYSICIAN ASSISTANT

## 2019-03-26 PROCEDURE — 82947 ASSAY GLUCOSE BLOOD QUANT: CPT

## 2019-03-26 PROCEDURE — 0210093 BYPASS CORONARY ARTERY, ONE ARTERY FROM CORONARY ARTERY WITH AUTOLOGOUS VENOUS TISSUE, OPEN APPROACH: ICD-10-PCS | Performed by: THORACIC SURGERY (CARDIOTHORACIC VASCULAR SURGERY)

## 2019-03-26 PROCEDURE — 25010000002 FENTANYL CITRATE (PF) 100 MCG/2ML SOLUTION: Performed by: ANESTHESIOLOGY

## 2019-03-26 PROCEDURE — 5A1221Z PERFORMANCE OF CARDIAC OUTPUT, CONTINUOUS: ICD-10-PCS | Performed by: THORACIC SURGERY (CARDIOTHORACIC VASCULAR SURGERY)

## 2019-03-26 PROCEDURE — 83735 ASSAY OF MAGNESIUM: CPT | Performed by: THORACIC SURGERY (CARDIOTHORACIC VASCULAR SURGERY)

## 2019-03-26 PROCEDURE — 82962 GLUCOSE BLOOD TEST: CPT

## 2019-03-26 PROCEDURE — 25010000002 ALBUMIN HUMAN 25% PER 50 ML

## 2019-03-26 PROCEDURE — 25010000002 MAGNESIUM SULFATE IN D5W 1G/100ML (PREMIX) 1-5 GM/100ML-% SOLUTION: Performed by: THORACIC SURGERY (CARDIOTHORACIC VASCULAR SURGERY)

## 2019-03-26 PROCEDURE — 85384 FIBRINOGEN ACTIVITY: CPT | Performed by: THORACIC SURGERY (CARDIOTHORACIC VASCULAR SURGERY)

## 2019-03-26 PROCEDURE — 85347 COAGULATION TIME ACTIVATED: CPT

## 2019-03-26 PROCEDURE — 25010000002 PHENYLEPHRINE PER 1 ML: Performed by: ANESTHESIOLOGY

## 2019-03-26 PROCEDURE — B246ZZ4 ULTRASONOGRAPHY OF RIGHT AND LEFT HEART, TRANSESOPHAGEAL: ICD-10-PCS | Performed by: THORACIC SURGERY (CARDIOTHORACIC VASCULAR SURGERY)

## 2019-03-26 PROCEDURE — 25010000002 PROPOFOL 10 MG/ML EMULSION: Performed by: ANESTHESIOLOGY

## 2019-03-26 PROCEDURE — 25010000003 POTASSIUM CHLORIDE PER 2 MEQ: Performed by: THORACIC SURGERY (CARDIOTHORACIC VASCULAR SURGERY)

## 2019-03-26 PROCEDURE — 94799 UNLISTED PULMONARY SVC/PX: CPT

## 2019-03-26 PROCEDURE — 93318 ECHO TRANSESOPHAGEAL INTRAOP: CPT | Performed by: ANESTHESIOLOGY

## 2019-03-26 PROCEDURE — P9041 ALBUMIN (HUMAN),5%, 50ML: HCPCS | Performed by: THORACIC SURGERY (CARDIOTHORACIC VASCULAR SURGERY)

## 2019-03-26 PROCEDURE — 86900 BLOOD TYPING SEROLOGIC ABO: CPT

## 2019-03-26 PROCEDURE — 85025 COMPLETE CBC W/AUTO DIFF WBC: CPT | Performed by: THORACIC SURGERY (CARDIOTHORACIC VASCULAR SURGERY)

## 2019-03-26 PROCEDURE — C1729 CATH, DRAINAGE: HCPCS | Performed by: THORACIC SURGERY (CARDIOTHORACIC VASCULAR SURGERY)

## 2019-03-26 PROCEDURE — C1751 CATH, INF, PER/CENT/MIDLINE: HCPCS | Performed by: ANESTHESIOLOGY

## 2019-03-26 PROCEDURE — 25010000002 PROPOFOL 1000 MG/ML EMULSION: Performed by: THORACIC SURGERY (CARDIOTHORACIC VASCULAR SURGERY)

## 2019-03-26 PROCEDURE — 85014 HEMATOCRIT: CPT

## 2019-03-26 PROCEDURE — 33508 ENDOSCOPIC VEIN HARVEST: CPT | Performed by: PHYSICIAN ASSISTANT

## 2019-03-26 PROCEDURE — 33405 REPLACEMENT AORTIC VALVE OPN: CPT | Performed by: PHYSICIAN ASSISTANT

## 2019-03-26 PROCEDURE — 93005 ELECTROCARDIOGRAM TRACING: CPT | Performed by: THORACIC SURGERY (CARDIOTHORACIC VASCULAR SURGERY)

## 2019-03-26 PROCEDURE — 80069 RENAL FUNCTION PANEL: CPT | Performed by: THORACIC SURGERY (CARDIOTHORACIC VASCULAR SURGERY)

## 2019-03-26 PROCEDURE — 63710000001 INSULIN REGULAR HUMAN PER 5 UNITS: Performed by: ANESTHESIOLOGY

## 2019-03-26 PROCEDURE — 88305 TISSUE EXAM BY PATHOLOGIST: CPT | Performed by: THORACIC SURGERY (CARDIOTHORACIC VASCULAR SURGERY)

## 2019-03-26 PROCEDURE — 33405 REPLACEMENT AORTIC VALVE OPN: CPT | Performed by: THORACIC SURGERY (CARDIOTHORACIC VASCULAR SURGERY)

## 2019-03-26 PROCEDURE — 33510 CABG VEIN SINGLE: CPT | Performed by: THORACIC SURGERY (CARDIOTHORACIC VASCULAR SURGERY)

## 2019-03-26 PROCEDURE — 93010 ELECTROCARDIOGRAM REPORT: CPT | Performed by: INTERNAL MEDICINE

## 2019-03-26 PROCEDURE — 25010000002 METOCLOPRAMIDE PER 10 MG: Performed by: THORACIC SURGERY (CARDIOTHORACIC VASCULAR SURGERY)

## 2019-03-26 DEVICE — SS SUTURE, 4 PER SLEEVE
Type: IMPLANTABLE DEVICE | Site: CHEST | Status: FUNCTIONAL
Brand: MYO/WIRE II

## 2019-03-26 DEVICE — VLV PERICARD PERIMOUNT MAGNA EASE 23: Type: IMPLANTABLE DEVICE | Site: HEART | Status: FUNCTIONAL

## 2019-03-26 DEVICE — SS SUTURE, 3 PER SLEEVE
Type: IMPLANTABLE DEVICE | Site: CHEST | Status: FUNCTIONAL
Brand: MYO/WIRE II

## 2019-03-26 RX ORDER — PROPOFOL 10 MG/ML
VIAL (ML) INTRAVENOUS CONTINUOUS PRN
Status: DISCONTINUED | OUTPATIENT
Start: 2019-03-26 | End: 2019-03-26 | Stop reason: SURG

## 2019-03-26 RX ORDER — NALOXONE HCL 0.4 MG/ML
0.4 VIAL (ML) INJECTION
Status: DISCONTINUED | OUTPATIENT
Start: 2019-03-26 | End: 2019-03-30 | Stop reason: HOSPADM

## 2019-03-26 RX ORDER — SODIUM CHLORIDE 9 MG/ML
INJECTION, SOLUTION INTRAVENOUS CONTINUOUS PRN
Status: DISCONTINUED | OUTPATIENT
Start: 2019-03-26 | End: 2019-03-26 | Stop reason: SURG

## 2019-03-26 RX ORDER — ATORVASTATIN CALCIUM 20 MG/1
40 TABLET, FILM COATED ORAL NIGHTLY
Status: DISCONTINUED | OUTPATIENT
Start: 2019-03-26 | End: 2019-03-30 | Stop reason: HOSPADM

## 2019-03-26 RX ORDER — ACETAMINOPHEN 325 MG/1
650 TABLET ORAL EVERY 4 HOURS PRN
Status: DISCONTINUED | OUTPATIENT
Start: 2019-03-26 | End: 2019-03-30 | Stop reason: HOSPADM

## 2019-03-26 RX ORDER — DOPAMINE HYDROCHLORIDE 160 MG/100ML
2-20 INJECTION, SOLUTION INTRAVENOUS CONTINUOUS PRN
Status: DISCONTINUED | OUTPATIENT
Start: 2019-03-26 | End: 2019-03-27

## 2019-03-26 RX ORDER — SODIUM CHLORIDE 0.9 % (FLUSH) 0.9 %
1-10 SYRINGE (ML) INJECTION AS NEEDED
Status: DISCONTINUED | OUTPATIENT
Start: 2019-03-26 | End: 2019-03-26 | Stop reason: HOSPADM

## 2019-03-26 RX ORDER — CEFAZOLIN SODIUM IN 0.9 % NACL 3 G/100 ML
3 INTRAVENOUS SOLUTION, PIGGYBACK (ML) INTRAVENOUS EVERY 8 HOURS
Status: COMPLETED | OUTPATIENT
Start: 2019-03-26 | End: 2019-03-28

## 2019-03-26 RX ORDER — MILRINONE LACTATE 0.2 MG/ML
.25-.75 INJECTION, SOLUTION INTRAVENOUS CONTINUOUS PRN
Status: DISCONTINUED | OUTPATIENT
Start: 2019-03-26 | End: 2019-03-27

## 2019-03-26 RX ORDER — BISACODYL 5 MG/1
10 TABLET, DELAYED RELEASE ORAL DAILY PRN
Status: DISCONTINUED | OUTPATIENT
Start: 2019-03-26 | End: 2019-03-30 | Stop reason: HOSPADM

## 2019-03-26 RX ORDER — FUROSEMIDE 10 MG/ML
40 INJECTION INTRAMUSCULAR; INTRAVENOUS EVERY 6 HOURS PRN
Status: DISCONTINUED | OUTPATIENT
Start: 2019-03-26 | End: 2019-03-27

## 2019-03-26 RX ORDER — ASPIRIN 81 MG/1
81 TABLET ORAL DAILY
Status: DISCONTINUED | OUTPATIENT
Start: 2019-03-27 | End: 2019-03-30 | Stop reason: HOSPADM

## 2019-03-26 RX ORDER — CEFAZOLIN SODIUM IN 0.9 % NACL 3 G/100 ML
2 INTRAVENOUS SOLUTION, PIGGYBACK (ML) INTRAVENOUS
Status: COMPLETED | OUTPATIENT
Start: 2019-03-26 | End: 2019-03-26

## 2019-03-26 RX ORDER — PANTOPRAZOLE SODIUM 40 MG/1
40 TABLET, DELAYED RELEASE ORAL DAILY
Status: DISCONTINUED | OUTPATIENT
Start: 2019-03-26 | End: 2019-03-30 | Stop reason: HOSPADM

## 2019-03-26 RX ORDER — POTASSIUM CHLORIDE 29.8 MG/ML
20 INJECTION INTRAVENOUS
Status: COMPLETED | OUTPATIENT
Start: 2019-03-26 | End: 2019-03-26

## 2019-03-26 RX ORDER — FENTANYL CITRATE 50 UG/ML
50 INJECTION, SOLUTION INTRAMUSCULAR; INTRAVENOUS
Status: DISCONTINUED | OUTPATIENT
Start: 2019-03-26 | End: 2019-03-26 | Stop reason: HOSPADM

## 2019-03-26 RX ORDER — AMINOCAPROIC ACID 250 MG/ML
INJECTION, SOLUTION INTRAVENOUS AS NEEDED
Status: DISCONTINUED | OUTPATIENT
Start: 2019-03-26 | End: 2019-03-26 | Stop reason: SURG

## 2019-03-26 RX ORDER — HEPARIN SODIUM 1000 [USP'U]/ML
INJECTION, SOLUTION INTRAVENOUS; SUBCUTANEOUS AS NEEDED
Status: DISCONTINUED | OUTPATIENT
Start: 2019-03-26 | End: 2019-03-26 | Stop reason: SURG

## 2019-03-26 RX ORDER — POTASSIUM CHLORIDE 29.8 MG/ML
20 INJECTION INTRAVENOUS
Status: DISCONTINUED | OUTPATIENT
Start: 2019-03-26 | End: 2019-03-30 | Stop reason: HOSPADM

## 2019-03-26 RX ORDER — SENNA AND DOCUSATE SODIUM 50; 8.6 MG/1; MG/1
2 TABLET, FILM COATED ORAL NIGHTLY
Status: DISCONTINUED | OUTPATIENT
Start: 2019-03-27 | End: 2019-03-30 | Stop reason: HOSPADM

## 2019-03-26 RX ORDER — MIDAZOLAM HYDROCHLORIDE 1 MG/ML
2 INJECTION INTRAMUSCULAR; INTRAVENOUS
Status: DISCONTINUED | OUTPATIENT
Start: 2019-03-26 | End: 2019-03-27

## 2019-03-26 RX ORDER — OXYCODONE HYDROCHLORIDE 5 MG/1
10 TABLET ORAL EVERY 4 HOURS PRN
Status: DISCONTINUED | OUTPATIENT
Start: 2019-03-26 | End: 2019-03-30 | Stop reason: HOSPADM

## 2019-03-26 RX ORDER — FAMOTIDINE 10 MG/ML
20 INJECTION, SOLUTION INTRAVENOUS ONCE
Status: COMPLETED | OUTPATIENT
Start: 2019-03-26 | End: 2019-03-26

## 2019-03-26 RX ORDER — SUFENTANIL CITRATE 50 UG/ML
INJECTION EPIDURAL; INTRAVENOUS AS NEEDED
Status: DISCONTINUED | OUTPATIENT
Start: 2019-03-26 | End: 2019-03-26 | Stop reason: SURG

## 2019-03-26 RX ORDER — SODIUM CHLORIDE 9 MG/ML
30 INJECTION, SOLUTION INTRAVENOUS CONTINUOUS PRN
Status: DISCONTINUED | OUTPATIENT
Start: 2019-03-26 | End: 2019-03-27

## 2019-03-26 RX ORDER — MORPHINE SULFATE 2 MG/ML
1 INJECTION, SOLUTION INTRAMUSCULAR; INTRAVENOUS EVERY 4 HOURS PRN
Status: DISCONTINUED | OUTPATIENT
Start: 2019-03-26 | End: 2019-03-30 | Stop reason: HOSPADM

## 2019-03-26 RX ORDER — LIDOCAINE HYDROCHLORIDE 10 MG/ML
0.5 INJECTION, SOLUTION EPIDURAL; INFILTRATION; INTRACAUDAL; PERINEURAL ONCE AS NEEDED
Status: DISCONTINUED | OUTPATIENT
Start: 2019-03-26 | End: 2019-03-26 | Stop reason: HOSPADM

## 2019-03-26 RX ORDER — BISACODYL 10 MG
10 SUPPOSITORY, RECTAL RECTAL DAILY PRN
Status: DISCONTINUED | OUTPATIENT
Start: 2019-03-27 | End: 2019-03-30 | Stop reason: HOSPADM

## 2019-03-26 RX ORDER — NITROGLYCERIN 20 MG/100ML
5-200 INJECTION INTRAVENOUS
Status: DISCONTINUED | OUTPATIENT
Start: 2019-03-26 | End: 2019-03-27

## 2019-03-26 RX ORDER — MAGNESIUM SULFATE 1 G/100ML
1 INJECTION INTRAVENOUS EVERY 8 HOURS
Status: COMPLETED | OUTPATIENT
Start: 2019-03-26 | End: 2019-03-27

## 2019-03-26 RX ORDER — SODIUM CHLORIDE 9 MG/ML
INJECTION, SOLUTION INTRAVENOUS AS NEEDED
Status: DISCONTINUED | OUTPATIENT
Start: 2019-03-26 | End: 2019-03-26 | Stop reason: HOSPADM

## 2019-03-26 RX ORDER — KETAMINE HYDROCHLORIDE 10 MG/ML
INJECTION INTRAMUSCULAR; INTRAVENOUS AS NEEDED
Status: DISCONTINUED | OUTPATIENT
Start: 2019-03-26 | End: 2019-03-26 | Stop reason: SURG

## 2019-03-26 RX ORDER — POTASSIUM CHLORIDE 750 MG/1
40 CAPSULE, EXTENDED RELEASE ORAL AS NEEDED
Status: DISCONTINUED | OUTPATIENT
Start: 2019-03-26 | End: 2019-03-30 | Stop reason: HOSPADM

## 2019-03-26 RX ORDER — PROMETHAZINE HYDROCHLORIDE 25 MG/1
12.5 TABLET ORAL EVERY 6 HOURS PRN
Status: DISCONTINUED | OUTPATIENT
Start: 2019-03-26 | End: 2019-03-30 | Stop reason: HOSPADM

## 2019-03-26 RX ORDER — MEPERIDINE HYDROCHLORIDE 25 MG/ML
25 INJECTION INTRAMUSCULAR; INTRAVENOUS; SUBCUTANEOUS EVERY 4 HOURS PRN
Status: ACTIVE | OUTPATIENT
Start: 2019-03-26 | End: 2019-03-27

## 2019-03-26 RX ORDER — PROTAMINE SULFATE 10 MG/ML
INJECTION, SOLUTION INTRAVENOUS AS NEEDED
Status: DISCONTINUED | OUTPATIENT
Start: 2019-03-26 | End: 2019-03-26 | Stop reason: SURG

## 2019-03-26 RX ORDER — ALBUMIN, HUMAN INJ 5% 5 %
1500 SOLUTION INTRAVENOUS AS NEEDED
Status: DISPENSED | OUTPATIENT
Start: 2019-03-26 | End: 2019-03-27

## 2019-03-26 RX ORDER — PAPAVERINE HYDROCHLORIDE 30 MG/ML
INJECTION INTRAMUSCULAR; INTRAVENOUS AS NEEDED
Status: DISCONTINUED | OUTPATIENT
Start: 2019-03-26 | End: 2019-03-26 | Stop reason: HOSPADM

## 2019-03-26 RX ORDER — GABAPENTIN 300 MG/1
300 CAPSULE ORAL ONCE
Status: DISCONTINUED | OUTPATIENT
Start: 2019-03-26 | End: 2019-03-26 | Stop reason: HOSPADM

## 2019-03-26 RX ORDER — SODIUM CHLORIDE 9 MG/ML
30 INJECTION, SOLUTION INTRAVENOUS CONTINUOUS
Status: DISCONTINUED | OUTPATIENT
Start: 2019-03-26 | End: 2019-03-27

## 2019-03-26 RX ORDER — MIDAZOLAM HYDROCHLORIDE 1 MG/ML
1 INJECTION INTRAMUSCULAR; INTRAVENOUS
Status: DISCONTINUED | OUTPATIENT
Start: 2019-03-26 | End: 2019-03-26 | Stop reason: HOSPADM

## 2019-03-26 RX ORDER — HEPARIN SODIUM 5000 [USP'U]/ML
INJECTION, SOLUTION INTRAVENOUS; SUBCUTANEOUS AS NEEDED
Status: DISCONTINUED | OUTPATIENT
Start: 2019-03-26 | End: 2019-03-26 | Stop reason: HOSPADM

## 2019-03-26 RX ORDER — GABAPENTIN 300 MG/1
300 CAPSULE ORAL ONCE
Status: COMPLETED | OUTPATIENT
Start: 2019-03-26 | End: 2019-03-26

## 2019-03-26 RX ORDER — NOREPINEPHRINE BIT/0.9 % NACL 8 MG/250ML
.02-.3 INFUSION BOTTLE (ML) INTRAVENOUS CONTINUOUS PRN
Status: DISCONTINUED | OUTPATIENT
Start: 2019-03-26 | End: 2019-03-27

## 2019-03-26 RX ORDER — CYCLOBENZAPRINE HCL 10 MG
10 TABLET ORAL EVERY 8 HOURS PRN
Status: DISCONTINUED | OUTPATIENT
Start: 2019-03-27 | End: 2019-03-30 | Stop reason: HOSPADM

## 2019-03-26 RX ORDER — MORPHINE SULFATE 2 MG/ML
4 INJECTION, SOLUTION INTRAMUSCULAR; INTRAVENOUS
Status: DISCONTINUED | OUTPATIENT
Start: 2019-03-26 | End: 2019-03-27

## 2019-03-26 RX ORDER — CHLORHEXIDINE GLUCONATE 0.12 MG/ML
15 RINSE ORAL EVERY 12 HOURS
Status: DISCONTINUED | OUTPATIENT
Start: 2019-03-26 | End: 2019-03-30

## 2019-03-26 RX ORDER — POTASSIUM CHLORIDE 7.45 MG/ML
10 INJECTION INTRAVENOUS
Status: DISCONTINUED | OUTPATIENT
Start: 2019-03-26 | End: 2019-03-30 | Stop reason: HOSPADM

## 2019-03-26 RX ORDER — ROCURONIUM BROMIDE 10 MG/ML
INJECTION, SOLUTION INTRAVENOUS AS NEEDED
Status: DISCONTINUED | OUTPATIENT
Start: 2019-03-26 | End: 2019-03-26 | Stop reason: SURG

## 2019-03-26 RX ORDER — CHLORHEXIDINE GLUCONATE 500 MG/1
1 CLOTH TOPICAL EVERY 12 HOURS PRN
Status: DISCONTINUED | OUTPATIENT
Start: 2019-03-26 | End: 2019-03-26 | Stop reason: HOSPADM

## 2019-03-26 RX ORDER — METOCLOPRAMIDE HYDROCHLORIDE 5 MG/ML
10 INJECTION INTRAMUSCULAR; INTRAVENOUS EVERY 6 HOURS
Status: DISPENSED | OUTPATIENT
Start: 2019-03-26 | End: 2019-03-27

## 2019-03-26 RX ORDER — SODIUM CHLORIDE 0.9 % (FLUSH) 0.9 %
30 SYRINGE (ML) INJECTION ONCE AS NEEDED
Status: DISCONTINUED | OUTPATIENT
Start: 2019-03-26 | End: 2019-03-27

## 2019-03-26 RX ORDER — ACETAMINOPHEN 500 MG
1000 TABLET ORAL ONCE
Status: COMPLETED | OUTPATIENT
Start: 2019-03-26 | End: 2019-03-26

## 2019-03-26 RX ORDER — POTASSIUM CHLORIDE 1.5 G/1.77G
40 POWDER, FOR SOLUTION ORAL AS NEEDED
Status: DISCONTINUED | OUTPATIENT
Start: 2019-03-26 | End: 2019-03-30 | Stop reason: HOSPADM

## 2019-03-26 RX ORDER — MAGNESIUM SULFATE HEPTAHYDRATE 500 MG/ML
INJECTION, SOLUTION INTRAMUSCULAR; INTRAVENOUS AS NEEDED
Status: DISCONTINUED | OUTPATIENT
Start: 2019-03-26 | End: 2019-03-26 | Stop reason: SURG

## 2019-03-26 RX ORDER — FAMOTIDINE 10 MG/ML
20 INJECTION, SOLUTION INTRAVENOUS ONCE
Status: DISCONTINUED | OUTPATIENT
Start: 2019-03-26 | End: 2019-03-26 | Stop reason: HOSPADM

## 2019-03-26 RX ORDER — ONDANSETRON 2 MG/ML
4 INJECTION INTRAMUSCULAR; INTRAVENOUS EVERY 6 HOURS PRN
Status: DISCONTINUED | OUTPATIENT
Start: 2019-03-26 | End: 2019-03-30 | Stop reason: HOSPADM

## 2019-03-26 RX ORDER — SODIUM CHLORIDE, SODIUM LACTATE, POTASSIUM CHLORIDE, CALCIUM CHLORIDE 600; 310; 30; 20 MG/100ML; MG/100ML; MG/100ML; MG/100ML
9 INJECTION, SOLUTION INTRAVENOUS CONTINUOUS
Status: DISCONTINUED | OUTPATIENT
Start: 2019-03-26 | End: 2019-03-26

## 2019-03-26 RX ORDER — MIDAZOLAM HYDROCHLORIDE 1 MG/ML
2 INJECTION INTRAMUSCULAR; INTRAVENOUS
Status: DISCONTINUED | OUTPATIENT
Start: 2019-03-26 | End: 2019-03-26 | Stop reason: HOSPADM

## 2019-03-26 RX ORDER — ALPRAZOLAM 0.25 MG/1
0.25 TABLET ORAL EVERY 8 HOURS PRN
Status: DISCONTINUED | OUTPATIENT
Start: 2019-03-26 | End: 2019-03-30 | Stop reason: HOSPADM

## 2019-03-26 RX ORDER — PROPOFOL 10 MG/ML
VIAL (ML) INTRAVENOUS AS NEEDED
Status: DISCONTINUED | OUTPATIENT
Start: 2019-03-26 | End: 2019-03-26 | Stop reason: SURG

## 2019-03-26 RX ORDER — ONDANSETRON 2 MG/ML
INJECTION INTRAMUSCULAR; INTRAVENOUS AS NEEDED
Status: DISCONTINUED | OUTPATIENT
Start: 2019-03-26 | End: 2019-03-26 | Stop reason: SURG

## 2019-03-26 RX ORDER — GABAPENTIN 300 MG/1
300 CAPSULE ORAL 3 TIMES DAILY
Status: DISCONTINUED | OUTPATIENT
Start: 2019-03-27 | End: 2019-03-30 | Stop reason: HOSPADM

## 2019-03-26 RX ORDER — ACETAMINOPHEN 500 MG
1000 TABLET ORAL ONCE
Status: DISCONTINUED | OUTPATIENT
Start: 2019-03-26 | End: 2019-03-26 | Stop reason: HOSPADM

## 2019-03-26 RX ORDER — ACETAMINOPHEN 650 MG/1
650 SUPPOSITORY RECTAL EVERY 4 HOURS PRN
Status: DISCONTINUED | OUTPATIENT
Start: 2019-03-26 | End: 2019-03-30 | Stop reason: HOSPADM

## 2019-03-26 RX ORDER — HYDROCODONE BITARTRATE AND ACETAMINOPHEN 5; 325 MG/1; MG/1
2 TABLET ORAL EVERY 4 HOURS PRN
Status: DISCONTINUED | OUTPATIENT
Start: 2019-03-26 | End: 2019-03-30 | Stop reason: HOSPADM

## 2019-03-26 RX ORDER — CHLORHEXIDINE GLUCONATE 0.12 MG/ML
15 RINSE ORAL ONCE
Status: DISCONTINUED | OUTPATIENT
Start: 2019-03-26 | End: 2019-03-26 | Stop reason: HOSPADM

## 2019-03-26 RX ORDER — PROMETHAZINE HYDROCHLORIDE 25 MG/ML
12.5 INJECTION, SOLUTION INTRAMUSCULAR; INTRAVENOUS EVERY 6 HOURS PRN
Status: DISCONTINUED | OUTPATIENT
Start: 2019-03-26 | End: 2019-03-30 | Stop reason: HOSPADM

## 2019-03-26 RX ADMIN — SODIUM CHLORIDE 4.2 UNITS/HR: 900 INJECTION, SOLUTION INTRAVENOUS at 12:23

## 2019-03-26 RX ADMIN — METOCLOPRAMIDE 10 MG: 5 INJECTION, SOLUTION INTRAMUSCULAR; INTRAVENOUS at 19:47

## 2019-03-26 RX ADMIN — ROCURONIUM BROMIDE 40 MG: 10 INJECTION INTRAVENOUS at 10:21

## 2019-03-26 RX ADMIN — CEFAZOLIN 2 G: 1 INJECTION, POWDER, FOR SOLUTION INTRAMUSCULAR; INTRAVENOUS; PARENTERAL at 12:59

## 2019-03-26 RX ADMIN — CHLORHEXIDINE GLUCONATE 15 ML: 1.2 RINSE ORAL at 15:23

## 2019-03-26 RX ADMIN — SODIUM CHLORIDE 2 MCG/KG/HR: 900 INJECTION, SOLUTION INTRAVENOUS at 10:14

## 2019-03-26 RX ADMIN — ALBUMIN HUMAN 250 ML: 0.05 INJECTION, SOLUTION INTRAVENOUS at 17:47

## 2019-03-26 RX ADMIN — MIDAZOLAM 2 MG: 1 INJECTION INTRAMUSCULAR; INTRAVENOUS at 07:44

## 2019-03-26 RX ADMIN — PROTAMINE SULFATE 300 MG: 10 INJECTION, SOLUTION INTRAVENOUS at 12:51

## 2019-03-26 RX ADMIN — MIDAZOLAM 1 MG: 1 INJECTION INTRAMUSCULAR; INTRAVENOUS at 09:15

## 2019-03-26 RX ADMIN — HEPARIN SODIUM 30000 UNITS: 1000 INJECTION, SOLUTION INTRAVENOUS; SUBCUTANEOUS at 11:19

## 2019-03-26 RX ADMIN — MAGNESIUM SULFATE HEPTAHYDRATE 1 G: 1 INJECTION, SOLUTION INTRAVENOUS at 15:19

## 2019-03-26 RX ADMIN — CEFAZOLIN 3 G: 1 INJECTION, POWDER, FOR SOLUTION INTRAMUSCULAR; INTRAVENOUS; PARENTERAL at 19:47

## 2019-03-26 RX ADMIN — SUFENTANIL CITRATE 5 MCG: 50 INJECTION EPIDURAL; INTRAVENOUS at 10:21

## 2019-03-26 RX ADMIN — PROPOFOL 25 MCG/KG/MIN: 10 INJECTION, EMULSION INTRAVENOUS at 11:36

## 2019-03-26 RX ADMIN — AMINOCAPROIC ACID 10 G: 250 INJECTION, SOLUTION INTRAVENOUS at 12:59

## 2019-03-26 RX ADMIN — FAMOTIDINE 20 MG: 10 INJECTION INTRAVENOUS at 07:44

## 2019-03-26 RX ADMIN — ROCURONIUM BROMIDE 10 MG: 10 INJECTION INTRAVENOUS at 10:54

## 2019-03-26 RX ADMIN — KETAMINE HYDROCHLORIDE 50 MG: 10 INJECTION INTRAMUSCULAR; INTRAVENOUS at 11:36

## 2019-03-26 RX ADMIN — FENTANYL CITRATE 50 MCG: 50 INJECTION INTRAMUSCULAR; INTRAVENOUS at 09:16

## 2019-03-26 RX ADMIN — KETAMINE HYDROCHLORIDE 50 MG: 10 INJECTION INTRAMUSCULAR; INTRAVENOUS at 10:21

## 2019-03-26 RX ADMIN — METOCLOPRAMIDE 10 MG: 5 INJECTION, SOLUTION INTRAMUSCULAR; INTRAVENOUS at 14:36

## 2019-03-26 RX ADMIN — GABAPENTIN 300 MG: 300 CAPSULE ORAL at 07:43

## 2019-03-26 RX ADMIN — ALBUMIN HUMAN 250 ML: 0.05 INJECTION, SOLUTION INTRAVENOUS at 22:12

## 2019-03-26 RX ADMIN — OXYCODONE HYDROCHLORIDE 10 MG: 5 TABLET ORAL at 18:52

## 2019-03-26 RX ADMIN — SODIUM CHLORIDE: 900 INJECTION, SOLUTION INTRAVENOUS at 10:06

## 2019-03-26 RX ADMIN — PHENYLEPHRINE HYDROCHLORIDE 0.2 MCG/KG/MIN: 10 INJECTION, SOLUTION INTRAMUSCULAR; INTRAVENOUS; SUBCUTANEOUS at 10:31

## 2019-03-26 RX ADMIN — CEFAZOLIN 2 G: 1 INJECTION, POWDER, FOR SOLUTION INTRAMUSCULAR; INTRAVENOUS; PARENTERAL at 10:29

## 2019-03-26 RX ADMIN — MAGNESIUM SULFATE HEPTAHYDRATE 1 G: 500 INJECTION, SOLUTION INTRAMUSCULAR; INTRAVENOUS at 10:41

## 2019-03-26 RX ADMIN — SODIUM CHLORIDE 30 ML/HR: 9 INJECTION, SOLUTION INTRAVENOUS at 15:48

## 2019-03-26 RX ADMIN — SUFENTANIL CITRATE 20 MCG: 50 INJECTION EPIDURAL; INTRAVENOUS at 10:54

## 2019-03-26 RX ADMIN — ONDANSETRON 4 MG: 2 INJECTION INTRAMUSCULAR; INTRAVENOUS at 13:25

## 2019-03-26 RX ADMIN — SUFENTANIL CITRATE 10 MCG: 50 INJECTION EPIDURAL; INTRAVENOUS at 13:37

## 2019-03-26 RX ADMIN — METOPROLOL TARTRATE 12.5 MG: 25 TABLET ORAL at 07:44

## 2019-03-26 RX ADMIN — PROPOFOL 100 MG: 10 INJECTION, EMULSION INTRAVENOUS at 10:21

## 2019-03-26 RX ADMIN — ACETAMINOPHEN 1000 MG: 500 TABLET, FILM COATED ORAL at 07:43

## 2019-03-26 RX ADMIN — SODIUM CHLORIDE 30 ML/HR: 9 INJECTION, SOLUTION INTRAVENOUS at 14:52

## 2019-03-26 RX ADMIN — MAGNESIUM SULFATE HEPTAHYDRATE 1 G: 500 INJECTION, SOLUTION INTRAMUSCULAR; INTRAVENOUS at 10:21

## 2019-03-26 RX ADMIN — POTASSIUM CHLORIDE 20 MEQ: 29.8 INJECTION, SOLUTION INTRAVENOUS at 16:40

## 2019-03-26 RX ADMIN — PROPOFOL 25 MCG/KG/MIN: 10 INJECTION, EMULSION INTRAVENOUS at 14:31

## 2019-03-26 RX ADMIN — FENTANYL CITRATE 50 MCG: 50 INJECTION INTRAMUSCULAR; INTRAVENOUS at 09:14

## 2019-03-26 RX ADMIN — MAGNESIUM SULFATE HEPTAHYDRATE 1 G: 1 INJECTION, SOLUTION INTRAVENOUS at 22:36

## 2019-03-26 RX ADMIN — MUPIROCIN 10 APPLICATION: 20 OINTMENT TOPICAL at 21:07

## 2019-03-26 RX ADMIN — ACETAMINOPHEN 650 MG: 325 TABLET, FILM COATED ORAL at 23:29

## 2019-03-26 RX ADMIN — SODIUM CHLORIDE 1 MG/HR: 9 INJECTION, SOLUTION INTRAVENOUS at 13:38

## 2019-03-26 RX ADMIN — SODIUM CHLORIDE: 9 INJECTION, SOLUTION INTRAVENOUS at 10:06

## 2019-03-26 RX ADMIN — MIDAZOLAM 1 MG: 1 INJECTION INTRAMUSCULAR; INTRAVENOUS at 09:14

## 2019-03-26 RX ADMIN — AMINOCAPROIC ACID 10 G: 250 INJECTION, SOLUTION INTRAVENOUS at 10:44

## 2019-03-26 RX ADMIN — ROCURONIUM BROMIDE 20 MG: 10 INJECTION INTRAVENOUS at 11:46

## 2019-03-26 NOTE — ANESTHESIA PROCEDURE NOTES
Central Line    Pre-sedation assessment completed: 3/26/2019 8:55 AM    Patient reassessed immediately prior to procedure    Patient location during procedure: holding area  Start time: 3/26/2019 8:55 AM  Stop Time:3/26/2019 9:10 AM  Indications: vascular access and MD/Surgeon request  Staff  Anesthesiologist: Fredy Hartman MD  Preanesthetic Checklist  Completed: patient identified, site marked, surgical consent, pre-op evaluation, timeout performed, IV checked, risks and benefits discussed and monitors and equipment checked  Central Line Prep  Sterile Tech:cap, gloves, gown, mask and sterile barriers  Prep: chloraprep  Patient monitoring: blood pressure monitoring, continuous pulse oximetry and EKG  Central Line Procedure  Laterality:right  Location:internal jugular  Catheter Type:Wagram-Rick  Catheter Size:9 Fr  Guidance:ultrasound guided  Assessment  Post procedure:biopatch applied, line sutured and occlusive dressing applied  Assessement:blood return through all ports, free fluid flow and chest x-ray ordered  Complications:no  Patient Tolerance:patient tolerated the procedure well with no apparent complications

## 2019-03-26 NOTE — ANESTHESIA POSTPROCEDURE EVALUATION
"Patient: Bird Gallegos    Procedure Summary     Date:  03/26/19 Room / Location:  [facility] OR  / [facility] MAIN OR    Anesthesia Start:  1006 Anesthesia Stop:  1411    Procedure:  INTRAOP QUIQUE; CORONARY ARTERY BYPASS X 1 UTILIZING ENDOSCOPICALLY HARVESTED RIGHT GREATER SAPHENOUS VEIN; AORTIC VALVE REPLACEMENT AND PRP. (N/A Chest) Diagnosis:       Aortic valve stenosis, etiology of cardiac valve disease unspecified      Coronary artery disease involving native coronary artery of native heart, angina presence unspecified      (Aortic valve stenosis, etiology of cardiac valve disease unspecified [I35.0])      (Coronary artery disease involving native coronary artery of native heart, angina presence unspecified [I25.10])    Surgeon:  Alexx Gandara MD Provider:  Gabe Yin MD    Anesthesia Type:  general ASA Status:  4          Anesthesia Type: general  Last vitals  BP   112/71 (03/26/19 1805)   Temp   36.7 °C (98.1 °F) (03/26/19 0716)   Pulse   87 (03/26/19 1805)   Resp   16 (03/26/19 1451)     SpO2   97 % (03/26/19 1805)     Post Anesthesia Care and Evaluation    Patient location during evaluation: bedside  Patient participation: complete - patient participated  Level of consciousness: awake  Pain management: adequate  Airway patency: patent  Anesthetic complications: No anesthetic complications    Cardiovascular status: acceptable  Respiratory status: acceptable  Hydration status: acceptable    Comments: /71   Pulse 87   Temp 36.7 °C (98.1 °F) (Oral)   Resp 16   Ht 170.2 cm (67\")   Wt 135 kg (298 lb 7 oz)   SpO2 97%   BMI 46.74 kg/m²         "

## 2019-03-26 NOTE — ANESTHESIA PROCEDURE NOTES
Arterial Line    Pre-sedation assessment completed: 3/26/2019 8:50 AM    Patient reassessed immediately prior to procedure    Patient location during procedure: holding area  Start time: 3/26/2019 8:50 AM  Stop Time:3/26/2019 8:55 AM       Line placed for hemodynamic monitoring, ABGs/Labs/ISTAT and MD/Surgeon request.  Performed By   Anesthesiologist: Fredy Hartman MD  Preanesthetic Checklist  Completed: patient identified, surgical consent, pre-op evaluation, timeout performed, IV checked, risks and benefits discussed and monitors and equipment checked  Arterial Line Prep   Sterile Tech: cap, gloves, gown, mask and sterile barriers  Prep: ChloraPrep  Patient monitoring: blood pressure monitoring, continuous pulse oximetry and EKG  Arterial Line Procedure   Laterality:left  Location:  radial artery  Catheter size: 20 G   Guidance: ultrasound guided  PROCEDURE NOTE/ULTRASOUND INTERPRETATION.  Using ultrasound guidance the potential vascular sites for insertion of the catheter were visualized to determine the patency of the vessel to be used for vascular access.  After selecting the appropriate site for insertion, the needle was visualized under ultrasound being inserted into the radial artery, followed by ultrasound confirmation of wire and catheter placement. There were no abnormalities seen on ultrasound; an image was taken; and the patient tolerated the procedure with no complications.   Number of attempts: 1  Successful placement: yes          Post Assessment   Dressing Type: occlusive dressing applied, secured with tape and wrist guard applied.   Complications no  Circ/Move/Sens Assessment: normal.   Patient Tolerance: patient tolerated the procedure well with no apparent complications

## 2019-03-26 NOTE — ANESTHESIA PROCEDURE NOTES
Procedure Performed: Emergent/Open-Heart Anesthesia QUIQUE     Start Time:        End Time:      Preanesthesia Checklist:  Procedure being performed at surgeon's request.    General Procedure Information  Diagnostic Indications for Echo:  assessment of surgical repair and hemodynamic monitoring  Physician Requesting Echo: Alexx Gandara MD  CPT Code:  Aortic stenosis, mitral regurgitation, diastolic dysfunction  Location performed:  OR  Bite block not placed  Heart visualized  Probe Insertion:  Easy  Probe Type:  Multiplane  Modalities:  Color flow mapping, continuous wave Doppler and pulse wave Doppler    Echocardiographic and Doppler Measurements    Ventricles    Right Ventricle:  Cavity size normal.  Thrombus not present.  Global function normal.    Left Ventricle:  Cavity size normal.  Hypertrophy present.  Thrombus not present.  Global Function normal.  Ejection Fraction 70%.      Ventricular Regional Function:  1- Basal Anteroseptal:  normal  2- Basal Anterior:  normal  3- Basal Anterolateral:  normal  4- Basal Inferolateral:  normal  5- Basal Inferior:  normal  6- Basal Inferoseptal:  normal  7- Mid Anteroseptal:  normal  8- Mid Anterior:  normal  9- Mid Anterolateral:  normal  10- Mid Inferolateral:  normal  11- Mid Inferior:  normal  12- Mid Inferoseptal:  normal  13- Apical Anterior:  normal  14- Apical Lateral:  normal  15- Apical Inferior:  normal  16- Apical Septal:  normal  17- Lovely:  normal      Valves    Aortic Valve:  Annulus calcified.  Stenosis severe.  Area: .77 cm².  Mean Gradient: 47 mmHg.  Regurgitation absent.  Leaflets calcified.  Leaflet motions restricted.      Mitral Valve:  Annulus normal.  Area: 3.61 cm².  Mean Gradient: 1 mmHg.  Regurgitation mild.  Leaflets thickened.  Leaflet motions normal.      Tricuspid Valve:  Annulus normal.  Stenosis not present.  Regurgitation absent.  Leaflets normal.  Leaflet motions normal.              Atria    Right Atrium:  Size normal.  Spontaneous echo  contrast not present.    Left Atrium:  Size normal.  Spontaneous echo contrast not present.  Left atrial appendage normal.      Septa    Atrial Septum:  Intra-atrial septal morphology normal.      Ventricular Septum:  Intra-ventricular septum morphology normal.      Diastolic Function Measurements:  Diastolic Dysfunction Grade= II  E= ms  A= ms  E/A Ratio= 1.5  DT= ms  S/D=  1.2  IVRT=    Other Findings  Pericardium:  normal  Pleural Effusion:  none  Pulmonary Arteries:  normal  Pulmonary Venous Flow:  blunted (decreased) systolic flow    Anesthesia Information  Performed Personally  Anesthesiologist:  Gabe Yin MD      Echocardiogram Comments:       S/p stented bioprosthetic AVR and CABG  Well-seated stented bioprosthetic aortic valve without paravalvular leak  Mean gradient 8 mmHg, KELSEA 2.98 cm2  Mild MR, no TR, EF 70%, all walls contract normally

## 2019-03-26 NOTE — ANESTHESIA PROCEDURE NOTES
Airway  Airway not difficult    General Information and Staff    Anesthesiologist: Gabe Yin MD    Indications and Patient Condition    Preoxygenated: yes  Mask difficulty assessment: 1 - vent by mask    Final Airway Details  Final airway type: endotracheal airway      Successful airway: ETT  Cuffed: yes   Successful intubation technique: direct laryngoscopy  Endotracheal tube insertion site: oral  Blade: Rodrigues  Blade size: 2  ETT size (mm): 8.0  Cormack-Lehane Classification: grade IIa - partial view of glottis  Placement verified by: chest auscultation and capnometry   Measured from: teeth  ETT to teeth (cm): 22    Additional Comments  Teeth checked after intubation, no damage noted.

## 2019-03-27 ENCOUNTER — APPOINTMENT (OUTPATIENT)
Dept: GENERAL RADIOLOGY | Facility: HOSPITAL | Age: 74
End: 2019-03-27

## 2019-03-27 LAB
ALBUMIN SERPL-MCNC: 4.1 G/DL (ref 3.5–5.2)
ANION GAP SERPL CALCULATED.3IONS-SCNC: 11.5 MMOL/L
BASOPHILS # BLD AUTO: 0.02 10*3/MM3 (ref 0–0.2)
BASOPHILS NFR BLD AUTO: 0.2 % (ref 0–1.5)
BUN BLD-MCNC: 14 MG/DL (ref 8–23)
BUN/CREAT SERPL: 13.9 (ref 7–25)
CALCIUM SPEC-SCNC: 8.5 MG/DL (ref 8.6–10.5)
CHLORIDE SERPL-SCNC: 107 MMOL/L (ref 98–107)
CO2 SERPL-SCNC: 23.5 MMOL/L (ref 22–29)
CREAT BLD-MCNC: 1.01 MG/DL (ref 0.76–1.27)
CYTO UR: NORMAL
DEPRECATED RDW RBC AUTO: 48.4 FL (ref 37–54)
EOSINOPHIL # BLD AUTO: 0.01 10*3/MM3 (ref 0–0.4)
EOSINOPHIL NFR BLD AUTO: 0.1 % (ref 0.3–6.2)
ERYTHROCYTE [DISTWIDTH] IN BLOOD BY AUTOMATED COUNT: 14.3 % (ref 12.3–15.4)
GFR SERPL CREATININE-BSD FRML MDRD: 72 ML/MIN/1.73
GLUCOSE BLD-MCNC: 135 MG/DL (ref 65–99)
GLUCOSE BLDC GLUCOMTR-MCNC: 115 MG/DL (ref 70–130)
GLUCOSE BLDC GLUCOMTR-MCNC: 131 MG/DL (ref 70–130)
GLUCOSE BLDC GLUCOMTR-MCNC: 134 MG/DL (ref 70–130)
GLUCOSE BLDC GLUCOMTR-MCNC: 138 MG/DL (ref 70–130)
GLUCOSE BLDC GLUCOMTR-MCNC: 140 MG/DL (ref 70–130)
GLUCOSE BLDC GLUCOMTR-MCNC: 190 MG/DL (ref 70–130)
GLUCOSE BLDC GLUCOMTR-MCNC: 192 MG/DL (ref 70–130)
GLUCOSE BLDC GLUCOMTR-MCNC: 195 MG/DL (ref 70–130)
GLUCOSE BLDC GLUCOMTR-MCNC: 258 MG/DL (ref 70–130)
HCT VFR BLD AUTO: 37.3 % (ref 37.5–51)
HGB BLD-MCNC: 11.9 G/DL (ref 13–17.7)
IMM GRANULOCYTES # BLD AUTO: 0.04 10*3/MM3 (ref 0–0.05)
IMM GRANULOCYTES NFR BLD AUTO: 0.4 % (ref 0–0.5)
INR PPP: 1.21 (ref 0.9–1.1)
LAB AP CASE REPORT: NORMAL
LYMPHOCYTES # BLD AUTO: 0.62 10*3/MM3 (ref 0.7–3.1)
LYMPHOCYTES NFR BLD AUTO: 6.2 % (ref 19.6–45.3)
MAGNESIUM SERPL-MCNC: 2.4 MG/DL (ref 1.6–2.4)
MCH RBC QN AUTO: 29.5 PG (ref 26.6–33)
MCHC RBC AUTO-ENTMCNC: 31.9 G/DL (ref 31.5–35.7)
MCV RBC AUTO: 92.6 FL (ref 79–97)
MONOCYTES # BLD AUTO: 0.85 10*3/MM3 (ref 0.1–0.9)
MONOCYTES NFR BLD AUTO: 8.4 % (ref 5–12)
NEUTROPHILS # BLD AUTO: 8.52 10*3/MM3 (ref 1.4–7)
NEUTROPHILS NFR BLD AUTO: 84.7 % (ref 42.7–76)
NRBC BLD AUTO-RTO: 0 /100 WBC (ref 0–0)
PATH REPORT.FINAL DX SPEC: NORMAL
PATH REPORT.GROSS SPEC: NORMAL
PHOSPHATE SERPL-MCNC: 3.4 MG/DL (ref 2.5–4.5)
PLATELET # BLD AUTO: 136 10*3/MM3 (ref 140–450)
PMV BLD AUTO: 11.6 FL (ref 6–12)
POTASSIUM BLD-SCNC: 3.7 MMOL/L (ref 3.5–5.2)
POTASSIUM BLD-SCNC: 4 MMOL/L (ref 3.5–5.2)
PROTHROMBIN TIME: 15.1 SECONDS (ref 11.7–14.2)
RBC # BLD AUTO: 4.03 10*6/MM3 (ref 4.14–5.8)
SODIUM BLD-SCNC: 142 MMOL/L (ref 136–145)
WBC NRBC COR # BLD: 10.06 10*3/MM3 (ref 3.4–10.8)

## 2019-03-27 PROCEDURE — 71045 X-RAY EXAM CHEST 1 VIEW: CPT

## 2019-03-27 PROCEDURE — 25010000002 ENOXAPARIN PER 10 MG: Performed by: THORACIC SURGERY (CARDIOTHORACIC VASCULAR SURGERY)

## 2019-03-27 PROCEDURE — 97162 PT EVAL MOD COMPLEX 30 MIN: CPT

## 2019-03-27 PROCEDURE — 25010000002 CEFAZOLIN PER 500 MG: Performed by: THORACIC SURGERY (CARDIOTHORACIC VASCULAR SURGERY)

## 2019-03-27 PROCEDURE — 93010 ELECTROCARDIOGRAM REPORT: CPT | Performed by: INTERNAL MEDICINE

## 2019-03-27 PROCEDURE — 25010000002 FUROSEMIDE PER 20 MG: Performed by: PHYSICIAN ASSISTANT

## 2019-03-27 PROCEDURE — 25010000003 POTASSIUM CHLORIDE PER 2 MEQ: Performed by: THORACIC SURGERY (CARDIOTHORACIC VASCULAR SURGERY)

## 2019-03-27 PROCEDURE — 99024 POSTOP FOLLOW-UP VISIT: CPT | Performed by: NURSE PRACTITIONER

## 2019-03-27 PROCEDURE — 99232 SBSQ HOSP IP/OBS MODERATE 35: CPT | Performed by: INTERNAL MEDICINE

## 2019-03-27 PROCEDURE — 82962 GLUCOSE BLOOD TEST: CPT

## 2019-03-27 PROCEDURE — 25010000002 HYDRALAZINE PER 20 MG: Performed by: PHYSICIAN ASSISTANT

## 2019-03-27 PROCEDURE — 25010000002 MORPHINE PER 10 MG: Performed by: THORACIC SURGERY (CARDIOTHORACIC VASCULAR SURGERY)

## 2019-03-27 PROCEDURE — 93005 ELECTROCARDIOGRAM TRACING: CPT | Performed by: THORACIC SURGERY (CARDIOTHORACIC VASCULAR SURGERY)

## 2019-03-27 PROCEDURE — 83735 ASSAY OF MAGNESIUM: CPT | Performed by: THORACIC SURGERY (CARDIOTHORACIC VASCULAR SURGERY)

## 2019-03-27 PROCEDURE — 80069 RENAL FUNCTION PANEL: CPT | Performed by: THORACIC SURGERY (CARDIOTHORACIC VASCULAR SURGERY)

## 2019-03-27 PROCEDURE — 85610 PROTHROMBIN TIME: CPT | Performed by: THORACIC SURGERY (CARDIOTHORACIC VASCULAR SURGERY)

## 2019-03-27 PROCEDURE — 25010000002 MAGNESIUM SULFATE IN D5W 1G/100ML (PREMIX) 1-5 GM/100ML-% SOLUTION: Performed by: THORACIC SURGERY (CARDIOTHORACIC VASCULAR SURGERY)

## 2019-03-27 PROCEDURE — 63710000001 INSULIN LISPRO (HUMAN) PER 5 UNITS: Performed by: PHYSICIAN ASSISTANT

## 2019-03-27 PROCEDURE — 97110 THERAPEUTIC EXERCISES: CPT

## 2019-03-27 PROCEDURE — 84132 ASSAY OF SERUM POTASSIUM: CPT | Performed by: THORACIC SURGERY (CARDIOTHORACIC VASCULAR SURGERY)

## 2019-03-27 PROCEDURE — 85025 COMPLETE CBC W/AUTO DIFF WBC: CPT | Performed by: THORACIC SURGERY (CARDIOTHORACIC VASCULAR SURGERY)

## 2019-03-27 RX ORDER — CARVEDILOL 3.12 MG/1
3.12 TABLET ORAL EVERY 12 HOURS
Status: DISCONTINUED | OUTPATIENT
Start: 2019-03-27 | End: 2019-03-28

## 2019-03-27 RX ORDER — HYDRALAZINE HYDROCHLORIDE 20 MG/ML
20 INJECTION INTRAMUSCULAR; INTRAVENOUS EVERY 6 HOURS PRN
Status: DISCONTINUED | OUTPATIENT
Start: 2019-03-27 | End: 2019-03-30 | Stop reason: HOSPADM

## 2019-03-27 RX ORDER — ALPRAZOLAM 0.25 MG/1
0.25 TABLET ORAL NIGHTLY PRN
Status: DISCONTINUED | OUTPATIENT
Start: 2019-03-27 | End: 2019-03-30 | Stop reason: HOSPADM

## 2019-03-27 RX ORDER — DEXTROSE MONOHYDRATE 25 G/50ML
25 INJECTION, SOLUTION INTRAVENOUS
Status: DISCONTINUED | OUTPATIENT
Start: 2019-03-27 | End: 2019-03-30 | Stop reason: HOSPADM

## 2019-03-27 RX ORDER — TAMSULOSIN HYDROCHLORIDE 0.4 MG/1
0.4 CAPSULE ORAL NIGHTLY
Status: DISCONTINUED | OUTPATIENT
Start: 2019-03-27 | End: 2019-03-30 | Stop reason: HOSPADM

## 2019-03-27 RX ORDER — NICOTINE POLACRILEX 4 MG
15 LOZENGE BUCCAL
Status: DISCONTINUED | OUTPATIENT
Start: 2019-03-27 | End: 2019-03-27

## 2019-03-27 RX ORDER — FUROSEMIDE 10 MG/ML
40 INJECTION INTRAMUSCULAR; INTRAVENOUS ONCE
Status: COMPLETED | OUTPATIENT
Start: 2019-03-27 | End: 2019-03-27

## 2019-03-27 RX ADMIN — GABAPENTIN 300 MG: 300 CAPSULE ORAL at 08:01

## 2019-03-27 RX ADMIN — ENOXAPARIN SODIUM 40 MG: 40 INJECTION SUBCUTANEOUS at 17:36

## 2019-03-27 RX ADMIN — GABAPENTIN 300 MG: 300 CAPSULE ORAL at 22:04

## 2019-03-27 RX ADMIN — CHLORHEXIDINE GLUCONATE 15 ML: 1.2 RINSE ORAL at 02:19

## 2019-03-27 RX ADMIN — CARVEDILOL 3.12 MG: 3.12 TABLET, FILM COATED ORAL at 09:58

## 2019-03-27 RX ADMIN — OXYCODONE HYDROCHLORIDE 10 MG: 5 TABLET ORAL at 13:28

## 2019-03-27 RX ADMIN — HYDRALAZINE HYDROCHLORIDE 20 MG: 20 INJECTION INTRAMUSCULAR; INTRAVENOUS at 17:42

## 2019-03-27 RX ADMIN — SENNOSIDES AND DOCUSATE SODIUM 2 TABLET: 8.6; 5 TABLET ORAL at 20:25

## 2019-03-27 RX ADMIN — INSULIN LISPRO 4 UNITS: 100 INJECTION, SOLUTION INTRAVENOUS; SUBCUTANEOUS at 20:25

## 2019-03-27 RX ADMIN — OXYCODONE HYDROCHLORIDE 10 MG: 5 TABLET ORAL at 17:35

## 2019-03-27 RX ADMIN — GABAPENTIN 300 MG: 300 CAPSULE ORAL at 17:35

## 2019-03-27 RX ADMIN — ACETAMINOPHEN 650 MG: 325 TABLET, FILM COATED ORAL at 09:46

## 2019-03-27 RX ADMIN — INSULIN LISPRO 2 UNITS: 100 INJECTION, SOLUTION INTRAVENOUS; SUBCUTANEOUS at 11:41

## 2019-03-27 RX ADMIN — MUPIROCIN 10 APPLICATION: 20 OINTMENT TOPICAL at 20:26

## 2019-03-27 RX ADMIN — CEFAZOLIN 3 G: 1 INJECTION, POWDER, FOR SOLUTION INTRAMUSCULAR; INTRAVENOUS; PARENTERAL at 03:57

## 2019-03-27 RX ADMIN — MAGNESIUM SULFATE HEPTAHYDRATE 1 G: 1 INJECTION, SOLUTION INTRAVENOUS at 06:48

## 2019-03-27 RX ADMIN — ATORVASTATIN CALCIUM 40 MG: 20 TABLET, FILM COATED ORAL at 20:25

## 2019-03-27 RX ADMIN — METOPROLOL TARTRATE 12.5 MG: 25 TABLET ORAL at 08:25

## 2019-03-27 RX ADMIN — MUPIROCIN 10 APPLICATION: 20 OINTMENT TOPICAL at 08:01

## 2019-03-27 RX ADMIN — INSULIN LISPRO 2 UNITS: 100 INJECTION, SOLUTION INTRAVENOUS; SUBCUTANEOUS at 17:36

## 2019-03-27 RX ADMIN — INSULIN LISPRO 2 UNITS: 100 INJECTION, SOLUTION INTRAVENOUS; SUBCUTANEOUS at 09:32

## 2019-03-27 RX ADMIN — HYDROCODONE BITARTRATE AND ACETAMINOPHEN 2 TABLET: 5; 325 TABLET ORAL at 23:07

## 2019-03-27 RX ADMIN — TAMSULOSIN HYDROCHLORIDE 0.4 MG: 0.4 CAPSULE ORAL at 20:25

## 2019-03-27 RX ADMIN — CEFAZOLIN 3 G: 1 INJECTION, POWDER, FOR SOLUTION INTRAMUSCULAR; INTRAVENOUS; PARENTERAL at 14:50

## 2019-03-27 RX ADMIN — MORPHINE SULFATE 1 MG: 2 INJECTION, SOLUTION INTRAMUSCULAR; INTRAVENOUS at 05:57

## 2019-03-27 RX ADMIN — OXYCODONE HYDROCHLORIDE 10 MG: 5 TABLET ORAL at 05:25

## 2019-03-27 RX ADMIN — HYDRALAZINE HYDROCHLORIDE 20 MG: 20 INJECTION INTRAMUSCULAR; INTRAVENOUS at 08:31

## 2019-03-27 RX ADMIN — POTASSIUM CHLORIDE 20 MEQ: 29.8 INJECTION, SOLUTION INTRAVENOUS at 03:57

## 2019-03-27 RX ADMIN — OXYCODONE HYDROCHLORIDE 10 MG: 5 TABLET ORAL at 01:17

## 2019-03-27 RX ADMIN — CARVEDILOL 3.12 MG: 3.12 TABLET, FILM COATED ORAL at 22:03

## 2019-03-27 RX ADMIN — CYCLOBENZAPRINE 10 MG: 10 TABLET, FILM COATED ORAL at 08:09

## 2019-03-27 RX ADMIN — SODIUM CHLORIDE 3 MG/HR: 9 INJECTION, SOLUTION INTRAVENOUS at 01:24

## 2019-03-27 RX ADMIN — FUROSEMIDE 40 MG: 10 INJECTION, SOLUTION INTRAMUSCULAR; INTRAVENOUS at 08:26

## 2019-03-27 RX ADMIN — PANTOPRAZOLE SODIUM 40 MG: 40 TABLET, DELAYED RELEASE ORAL at 07:57

## 2019-03-27 RX ADMIN — OXYCODONE HYDROCHLORIDE 10 MG: 5 TABLET ORAL at 09:20

## 2019-03-27 RX ADMIN — ACETAMINOPHEN 650 MG: 325 TABLET, FILM COATED ORAL at 05:49

## 2019-03-27 RX ADMIN — SERTRALINE 50 MG: 50 TABLET, FILM COATED ORAL at 11:41

## 2019-03-27 RX ADMIN — ASPIRIN 81 MG: 81 TABLET, DELAYED RELEASE ORAL at 08:01

## 2019-03-27 RX ADMIN — METOPROLOL TARTRATE 12.5 MG: 25 TABLET ORAL at 08:01

## 2019-03-27 RX ADMIN — CEFAZOLIN 3 G: 1 INJECTION, POWDER, FOR SOLUTION INTRAMUSCULAR; INTRAVENOUS; PARENTERAL at 20:26

## 2019-03-28 ENCOUNTER — APPOINTMENT (OUTPATIENT)
Dept: GENERAL RADIOLOGY | Facility: HOSPITAL | Age: 74
End: 2019-03-28

## 2019-03-28 LAB
ANION GAP SERPL CALCULATED.3IONS-SCNC: 12.6 MMOL/L
BUN BLD-MCNC: 16 MG/DL (ref 8–23)
BUN/CREAT SERPL: 18.6 (ref 7–25)
CALCIUM SPEC-SCNC: 8.7 MG/DL (ref 8.6–10.5)
CHLORIDE SERPL-SCNC: 102 MMOL/L (ref 98–107)
CO2 SERPL-SCNC: 25.4 MMOL/L (ref 22–29)
CREAT BLD-MCNC: 0.86 MG/DL (ref 0.76–1.27)
DEPRECATED RDW RBC AUTO: 49.8 FL (ref 37–54)
ERYTHROCYTE [DISTWIDTH] IN BLOOD BY AUTOMATED COUNT: 14.6 % (ref 12.3–15.4)
GFR SERPL CREATININE-BSD FRML MDRD: 87 ML/MIN/1.73
GLUCOSE BLD-MCNC: 195 MG/DL (ref 65–99)
GLUCOSE BLDC GLUCOMTR-MCNC: 187 MG/DL (ref 70–130)
GLUCOSE BLDC GLUCOMTR-MCNC: 211 MG/DL (ref 70–130)
GLUCOSE BLDC GLUCOMTR-MCNC: 234 MG/DL (ref 70–130)
GLUCOSE BLDC GLUCOMTR-MCNC: 242 MG/DL (ref 70–130)
HCT VFR BLD AUTO: 36.1 % (ref 37.5–51)
HGB BLD-MCNC: 11.7 G/DL (ref 13–17.7)
MCH RBC QN AUTO: 29.8 PG (ref 26.6–33)
MCHC RBC AUTO-ENTMCNC: 32.4 G/DL (ref 31.5–35.7)
MCV RBC AUTO: 92.1 FL (ref 79–97)
PLATELET # BLD AUTO: 133 10*3/MM3 (ref 140–450)
PMV BLD AUTO: 11.9 FL (ref 6–12)
POTASSIUM BLD-SCNC: 3.9 MMOL/L (ref 3.5–5.2)
RBC # BLD AUTO: 3.92 10*6/MM3 (ref 4.14–5.8)
SODIUM BLD-SCNC: 140 MMOL/L (ref 136–145)
WBC NRBC COR # BLD: 11.7 10*3/MM3 (ref 3.4–10.8)

## 2019-03-28 PROCEDURE — 99232 SBSQ HOSP IP/OBS MODERATE 35: CPT | Performed by: INTERNAL MEDICINE

## 2019-03-28 PROCEDURE — 63710000001 INSULIN LISPRO (HUMAN) PER 5 UNITS: Performed by: PHYSICIAN ASSISTANT

## 2019-03-28 PROCEDURE — 85027 COMPLETE CBC AUTOMATED: CPT | Performed by: THORACIC SURGERY (CARDIOTHORACIC VASCULAR SURGERY)

## 2019-03-28 PROCEDURE — 25010000002 FUROSEMIDE PER 20 MG: Performed by: INTERNAL MEDICINE

## 2019-03-28 PROCEDURE — 93005 ELECTROCARDIOGRAM TRACING: CPT | Performed by: THORACIC SURGERY (CARDIOTHORACIC VASCULAR SURGERY)

## 2019-03-28 PROCEDURE — 25010000002 ENOXAPARIN PER 10 MG: Performed by: THORACIC SURGERY (CARDIOTHORACIC VASCULAR SURGERY)

## 2019-03-28 PROCEDURE — 25010000002 FUROSEMIDE PER 20 MG: Performed by: NURSE PRACTITIONER

## 2019-03-28 PROCEDURE — 99024 POSTOP FOLLOW-UP VISIT: CPT | Performed by: NURSE PRACTITIONER

## 2019-03-28 PROCEDURE — 71045 X-RAY EXAM CHEST 1 VIEW: CPT

## 2019-03-28 PROCEDURE — 80048 BASIC METABOLIC PNL TOTAL CA: CPT | Performed by: THORACIC SURGERY (CARDIOTHORACIC VASCULAR SURGERY)

## 2019-03-28 PROCEDURE — 93010 ELECTROCARDIOGRAM REPORT: CPT | Performed by: INTERNAL MEDICINE

## 2019-03-28 PROCEDURE — 97110 THERAPEUTIC EXERCISES: CPT | Performed by: PHYSICAL THERAPIST

## 2019-03-28 PROCEDURE — 82962 GLUCOSE BLOOD TEST: CPT

## 2019-03-28 RX ORDER — FUROSEMIDE 10 MG/ML
40 INJECTION INTRAMUSCULAR; INTRAVENOUS ONCE
Status: COMPLETED | OUTPATIENT
Start: 2019-03-28 | End: 2019-03-28

## 2019-03-28 RX ORDER — CARVEDILOL 6.25 MG/1
6.25 TABLET ORAL ONCE
Status: COMPLETED | OUTPATIENT
Start: 2019-03-28 | End: 2019-03-28

## 2019-03-28 RX ORDER — CARVEDILOL 6.25 MG/1
6.25 TABLET ORAL EVERY 12 HOURS
Status: DISCONTINUED | OUTPATIENT
Start: 2019-03-28 | End: 2019-03-28

## 2019-03-28 RX ORDER — CARVEDILOL 12.5 MG/1
12.5 TABLET ORAL EVERY 12 HOURS
Status: DISCONTINUED | OUTPATIENT
Start: 2019-03-28 | End: 2019-03-29

## 2019-03-28 RX ORDER — CARVEDILOL 3.12 MG/1
3.12 TABLET ORAL ONCE
Status: COMPLETED | OUTPATIENT
Start: 2019-03-28 | End: 2019-03-28

## 2019-03-28 RX ADMIN — CYCLOBENZAPRINE 10 MG: 10 TABLET, FILM COATED ORAL at 00:52

## 2019-03-28 RX ADMIN — CARVEDILOL 6.25 MG: 6.25 TABLET, FILM COATED ORAL at 18:59

## 2019-03-28 RX ADMIN — GABAPENTIN 300 MG: 300 CAPSULE ORAL at 18:58

## 2019-03-28 RX ADMIN — FUROSEMIDE 40 MG: 10 INJECTION, SOLUTION INTRAMUSCULAR; INTRAVENOUS at 11:12

## 2019-03-28 RX ADMIN — INSULIN LISPRO 2 UNITS: 100 INJECTION, SOLUTION INTRAVENOUS; SUBCUTANEOUS at 11:12

## 2019-03-28 RX ADMIN — OXYCODONE HYDROCHLORIDE 10 MG: 5 TABLET ORAL at 05:07

## 2019-03-28 RX ADMIN — CARVEDILOL 3.12 MG: 3.12 TABLET, FILM COATED ORAL at 13:55

## 2019-03-28 RX ADMIN — INSULIN LISPRO 4 UNITS: 100 INJECTION, SOLUTION INTRAVENOUS; SUBCUTANEOUS at 19:21

## 2019-03-28 RX ADMIN — MUPIROCIN: 20 OINTMENT TOPICAL at 22:14

## 2019-03-28 RX ADMIN — INSULIN LISPRO 4 UNITS: 100 INJECTION, SOLUTION INTRAVENOUS; SUBCUTANEOUS at 20:51

## 2019-03-28 RX ADMIN — MUPIROCIN 10 APPLICATION: 20 OINTMENT TOPICAL at 08:32

## 2019-03-28 RX ADMIN — TAMSULOSIN HYDROCHLORIDE 0.4 MG: 0.4 CAPSULE ORAL at 20:50

## 2019-03-28 RX ADMIN — GABAPENTIN 300 MG: 300 CAPSULE ORAL at 08:32

## 2019-03-28 RX ADMIN — ATORVASTATIN CALCIUM 40 MG: 20 TABLET, FILM COATED ORAL at 20:50

## 2019-03-28 RX ADMIN — ASPIRIN 81 MG: 81 TABLET, DELAYED RELEASE ORAL at 08:32

## 2019-03-28 RX ADMIN — OXYCODONE HYDROCHLORIDE 10 MG: 5 TABLET ORAL at 09:17

## 2019-03-28 RX ADMIN — GABAPENTIN 300 MG: 300 CAPSULE ORAL at 20:50

## 2019-03-28 RX ADMIN — FUROSEMIDE 40 MG: 10 INJECTION, SOLUTION INTRAMUSCULAR; INTRAVENOUS at 19:21

## 2019-03-28 RX ADMIN — CARVEDILOL 12.5 MG: 12.5 TABLET, FILM COATED ORAL at 20:50

## 2019-03-28 RX ADMIN — SERTRALINE 50 MG: 50 TABLET, FILM COATED ORAL at 08:32

## 2019-03-28 RX ADMIN — ENOXAPARIN SODIUM 40 MG: 40 INJECTION SUBCUTANEOUS at 19:21

## 2019-03-28 RX ADMIN — ACETAMINOPHEN 650 MG: 325 TABLET, FILM COATED ORAL at 05:07

## 2019-03-28 RX ADMIN — HYDROCODONE BITARTRATE AND ACETAMINOPHEN 2 TABLET: 5; 325 TABLET ORAL at 13:55

## 2019-03-28 RX ADMIN — HYDROCODONE BITARTRATE AND ACETAMINOPHEN 2 TABLET: 5; 325 TABLET ORAL at 19:26

## 2019-03-28 RX ADMIN — CARVEDILOL 3.12 MG: 3.12 TABLET, FILM COATED ORAL at 08:32

## 2019-03-28 RX ADMIN — PANTOPRAZOLE SODIUM 40 MG: 40 TABLET, DELAYED RELEASE ORAL at 08:32

## 2019-03-28 RX ADMIN — INSULIN LISPRO 2 UNITS: 100 INJECTION, SOLUTION INTRAVENOUS; SUBCUTANEOUS at 08:33

## 2019-03-28 RX ADMIN — CEFAZOLIN 3 G: 1 INJECTION, POWDER, FOR SOLUTION INTRAMUSCULAR; INTRAVENOUS; PARENTERAL at 04:07

## 2019-03-28 RX ADMIN — SENNOSIDES AND DOCUSATE SODIUM 2 TABLET: 8.6; 5 TABLET ORAL at 20:50

## 2019-03-29 ENCOUNTER — APPOINTMENT (OUTPATIENT)
Dept: GENERAL RADIOLOGY | Facility: HOSPITAL | Age: 74
End: 2019-03-29

## 2019-03-29 LAB
ANION GAP SERPL CALCULATED.3IONS-SCNC: 12.8 MMOL/L
BUN BLD-MCNC: 17 MG/DL (ref 8–23)
BUN/CREAT SERPL: 20.7 (ref 7–25)
CALCIUM SPEC-SCNC: 8.6 MG/DL (ref 8.6–10.5)
CHLORIDE SERPL-SCNC: 96 MMOL/L (ref 98–107)
CO2 SERPL-SCNC: 28.2 MMOL/L (ref 22–29)
CREAT BLD-MCNC: 0.82 MG/DL (ref 0.76–1.27)
DEPRECATED RDW RBC AUTO: 48.1 FL (ref 37–54)
ERYTHROCYTE [DISTWIDTH] IN BLOOD BY AUTOMATED COUNT: 14.3 % (ref 12.3–15.4)
GFR SERPL CREATININE-BSD FRML MDRD: 92 ML/MIN/1.73
GLUCOSE BLD-MCNC: 198 MG/DL (ref 65–99)
GLUCOSE BLDC GLUCOMTR-MCNC: 219 MG/DL (ref 70–130)
GLUCOSE BLDC GLUCOMTR-MCNC: 220 MG/DL (ref 70–130)
GLUCOSE BLDC GLUCOMTR-MCNC: 220 MG/DL (ref 70–130)
GLUCOSE BLDC GLUCOMTR-MCNC: 253 MG/DL (ref 70–130)
HCT VFR BLD AUTO: 38.1 % (ref 37.5–51)
HGB BLD-MCNC: 12.3 G/DL (ref 13–17.7)
MCH RBC QN AUTO: 29.7 PG (ref 26.6–33)
MCHC RBC AUTO-ENTMCNC: 32.3 G/DL (ref 31.5–35.7)
MCV RBC AUTO: 92 FL (ref 79–97)
PLATELET # BLD AUTO: 149 10*3/MM3 (ref 140–450)
PMV BLD AUTO: 11.3 FL (ref 6–12)
POTASSIUM BLD-SCNC: 4.2 MMOL/L (ref 3.5–5.2)
RBC # BLD AUTO: 4.14 10*6/MM3 (ref 4.14–5.8)
SODIUM BLD-SCNC: 137 MMOL/L (ref 136–145)
WBC NRBC COR # BLD: 11.4 10*3/MM3 (ref 3.4–10.8)

## 2019-03-29 PROCEDURE — 80048 BASIC METABOLIC PNL TOTAL CA: CPT | Performed by: THORACIC SURGERY (CARDIOTHORACIC VASCULAR SURGERY)

## 2019-03-29 PROCEDURE — 71046 X-RAY EXAM CHEST 2 VIEWS: CPT

## 2019-03-29 PROCEDURE — 99233 SBSQ HOSP IP/OBS HIGH 50: CPT | Performed by: INTERNAL MEDICINE

## 2019-03-29 PROCEDURE — 99024 POSTOP FOLLOW-UP VISIT: CPT | Performed by: NURSE PRACTITIONER

## 2019-03-29 PROCEDURE — 63710000001 INSULIN LISPRO (HUMAN) PER 5 UNITS: Performed by: PHYSICIAN ASSISTANT

## 2019-03-29 PROCEDURE — 85027 COMPLETE CBC AUTOMATED: CPT | Performed by: THORACIC SURGERY (CARDIOTHORACIC VASCULAR SURGERY)

## 2019-03-29 PROCEDURE — 82962 GLUCOSE BLOOD TEST: CPT

## 2019-03-29 PROCEDURE — 94799 UNLISTED PULMONARY SVC/PX: CPT

## 2019-03-29 PROCEDURE — 25010000002 ENOXAPARIN PER 10 MG: Performed by: THORACIC SURGERY (CARDIOTHORACIC VASCULAR SURGERY)

## 2019-03-29 PROCEDURE — 97110 THERAPEUTIC EXERCISES: CPT

## 2019-03-29 RX ORDER — HYDROCODONE BITARTRATE AND ACETAMINOPHEN 5; 325 MG/1; MG/1
1 TABLET ORAL EVERY 4 HOURS PRN
Qty: 30 TABLET | Refills: 0 | Status: SHIPPED | OUTPATIENT
Start: 2019-03-29 | End: 2019-04-05

## 2019-03-29 RX ORDER — BISACODYL 10 MG
10 SUPPOSITORY, RECTAL RECTAL ONCE
Status: DISCONTINUED | OUTPATIENT
Start: 2019-03-29 | End: 2019-03-30 | Stop reason: HOSPADM

## 2019-03-29 RX ORDER — FUROSEMIDE 40 MG/1
40 TABLET ORAL DAILY
Status: DISCONTINUED | OUTPATIENT
Start: 2019-03-29 | End: 2019-03-30 | Stop reason: HOSPADM

## 2019-03-29 RX ORDER — CARVEDILOL 25 MG/1
25 TABLET ORAL EVERY 12 HOURS
Status: DISCONTINUED | OUTPATIENT
Start: 2019-03-29 | End: 2019-03-30 | Stop reason: HOSPADM

## 2019-03-29 RX ADMIN — INSULIN LISPRO 6 UNITS: 100 INJECTION, SOLUTION INTRAVENOUS; SUBCUTANEOUS at 18:50

## 2019-03-29 RX ADMIN — INSULIN LISPRO 4 UNITS: 100 INJECTION, SOLUTION INTRAVENOUS; SUBCUTANEOUS at 20:28

## 2019-03-29 RX ADMIN — ASPIRIN 81 MG: 81 TABLET, DELAYED RELEASE ORAL at 09:06

## 2019-03-29 RX ADMIN — PANTOPRAZOLE SODIUM 40 MG: 40 TABLET, DELAYED RELEASE ORAL at 09:05

## 2019-03-29 RX ADMIN — MUPIROCIN 1 APPLICATION: 20 OINTMENT TOPICAL at 20:25

## 2019-03-29 RX ADMIN — ATORVASTATIN CALCIUM 40 MG: 20 TABLET, FILM COATED ORAL at 20:25

## 2019-03-29 RX ADMIN — GABAPENTIN 300 MG: 300 CAPSULE ORAL at 09:05

## 2019-03-29 RX ADMIN — ENOXAPARIN SODIUM 40 MG: 40 INJECTION SUBCUTANEOUS at 19:05

## 2019-03-29 RX ADMIN — MUPIROCIN 10 APPLICATION: 20 OINTMENT TOPICAL at 09:05

## 2019-03-29 RX ADMIN — SENNOSIDES AND DOCUSATE SODIUM 2 TABLET: 8.6; 5 TABLET ORAL at 20:25

## 2019-03-29 RX ADMIN — HYDROCODONE BITARTRATE AND ACETAMINOPHEN 2 TABLET: 5; 325 TABLET ORAL at 06:20

## 2019-03-29 RX ADMIN — SERTRALINE 50 MG: 50 TABLET, FILM COATED ORAL at 09:07

## 2019-03-29 RX ADMIN — CHLORHEXIDINE GLUCONATE 15 ML: 1.2 RINSE ORAL at 09:05

## 2019-03-29 RX ADMIN — CARVEDILOL 25 MG: 12.5 TABLET, FILM COATED ORAL at 09:07

## 2019-03-29 RX ADMIN — GABAPENTIN 300 MG: 300 CAPSULE ORAL at 18:49

## 2019-03-29 RX ADMIN — HYDROCODONE BITARTRATE AND ACETAMINOPHEN 2 TABLET: 5; 325 TABLET ORAL at 12:56

## 2019-03-29 RX ADMIN — TAMSULOSIN HYDROCHLORIDE 0.4 MG: 0.4 CAPSULE ORAL at 20:25

## 2019-03-29 RX ADMIN — INSULIN LISPRO 4 UNITS: 100 INJECTION, SOLUTION INTRAVENOUS; SUBCUTANEOUS at 12:55

## 2019-03-29 RX ADMIN — GABAPENTIN 300 MG: 300 CAPSULE ORAL at 20:25

## 2019-03-29 RX ADMIN — CARVEDILOL 25 MG: 12.5 TABLET, FILM COATED ORAL at 20:30

## 2019-03-29 RX ADMIN — HYDROCODONE BITARTRATE AND ACETAMINOPHEN 2 TABLET: 5; 325 TABLET ORAL at 18:49

## 2019-03-29 RX ADMIN — FUROSEMIDE 40 MG: 40 TABLET ORAL at 09:05

## 2019-03-29 RX ADMIN — INSULIN LISPRO 4 UNITS: 100 INJECTION, SOLUTION INTRAVENOUS; SUBCUTANEOUS at 06:21

## 2019-03-30 VITALS
OXYGEN SATURATION: 96 % | BODY MASS INDEX: 47.12 KG/M2 | DIASTOLIC BLOOD PRESSURE: 83 MMHG | HEART RATE: 82 BPM | SYSTOLIC BLOOD PRESSURE: 125 MMHG | TEMPERATURE: 98.4 F | WEIGHT: 300.2 LBS | HEIGHT: 67 IN | RESPIRATION RATE: 20 BRPM

## 2019-03-30 LAB
ANION GAP SERPL CALCULATED.3IONS-SCNC: 10.6 MMOL/L
BUN BLD-MCNC: 18 MG/DL (ref 8–23)
BUN/CREAT SERPL: 22.5 (ref 7–25)
CALCIUM SPEC-SCNC: 8.4 MG/DL (ref 8.6–10.5)
CHLORIDE SERPL-SCNC: 97 MMOL/L (ref 98–107)
CO2 SERPL-SCNC: 30.4 MMOL/L (ref 22–29)
CREAT BLD-MCNC: 0.8 MG/DL (ref 0.76–1.27)
DEPRECATED RDW RBC AUTO: 47.3 FL (ref 37–54)
ERYTHROCYTE [DISTWIDTH] IN BLOOD BY AUTOMATED COUNT: 14.2 % (ref 12.3–15.4)
GFR SERPL CREATININE-BSD FRML MDRD: 94 ML/MIN/1.73
GLUCOSE BLD-MCNC: 187 MG/DL (ref 65–99)
GLUCOSE BLDC GLUCOMTR-MCNC: 210 MG/DL (ref 70–130)
GLUCOSE BLDC GLUCOMTR-MCNC: 265 MG/DL (ref 70–130)
HCT VFR BLD AUTO: 38.4 % (ref 37.5–51)
HGB BLD-MCNC: 12.3 G/DL (ref 13–17.7)
MCH RBC QN AUTO: 29.3 PG (ref 26.6–33)
MCHC RBC AUTO-ENTMCNC: 32 G/DL (ref 31.5–35.7)
MCV RBC AUTO: 91.4 FL (ref 79–97)
PLATELET # BLD AUTO: 165 10*3/MM3 (ref 140–450)
PMV BLD AUTO: 11.4 FL (ref 6–12)
POTASSIUM BLD-SCNC: 4.1 MMOL/L (ref 3.5–5.2)
RBC # BLD AUTO: 4.2 10*6/MM3 (ref 4.14–5.8)
SODIUM BLD-SCNC: 138 MMOL/L (ref 136–145)
WBC NRBC COR # BLD: 9.24 10*3/MM3 (ref 3.4–10.8)

## 2019-03-30 PROCEDURE — 63710000001 INSULIN LISPRO (HUMAN) PER 5 UNITS: Performed by: PHYSICIAN ASSISTANT

## 2019-03-30 PROCEDURE — 99024 POSTOP FOLLOW-UP VISIT: CPT | Performed by: NURSE PRACTITIONER

## 2019-03-30 PROCEDURE — 99231 SBSQ HOSP IP/OBS SF/LOW 25: CPT | Performed by: NURSE PRACTITIONER

## 2019-03-30 PROCEDURE — 82962 GLUCOSE BLOOD TEST: CPT

## 2019-03-30 PROCEDURE — 97110 THERAPEUTIC EXERCISES: CPT

## 2019-03-30 PROCEDURE — 85027 COMPLETE CBC AUTOMATED: CPT | Performed by: THORACIC SURGERY (CARDIOTHORACIC VASCULAR SURGERY)

## 2019-03-30 PROCEDURE — 80048 BASIC METABOLIC PNL TOTAL CA: CPT | Performed by: THORACIC SURGERY (CARDIOTHORACIC VASCULAR SURGERY)

## 2019-03-30 RX ORDER — CARVEDILOL 25 MG/1
25 TABLET ORAL EVERY 12 HOURS
Qty: 60 TABLET | Refills: 2 | Status: SHIPPED | OUTPATIENT
Start: 2019-03-30

## 2019-03-30 RX ADMIN — INSULIN LISPRO 4 UNITS: 100 INJECTION, SOLUTION INTRAVENOUS; SUBCUTANEOUS at 06:58

## 2019-03-30 RX ADMIN — PANTOPRAZOLE SODIUM 40 MG: 40 TABLET, DELAYED RELEASE ORAL at 08:59

## 2019-03-30 RX ADMIN — HYDROCODONE BITARTRATE AND ACETAMINOPHEN 2 TABLET: 5; 325 TABLET ORAL at 07:22

## 2019-03-30 RX ADMIN — INSULIN LISPRO 6 UNITS: 100 INJECTION, SOLUTION INTRAVENOUS; SUBCUTANEOUS at 11:59

## 2019-03-30 RX ADMIN — SERTRALINE 50 MG: 50 TABLET, FILM COATED ORAL at 08:59

## 2019-03-30 RX ADMIN — ASPIRIN 81 MG: 81 TABLET, DELAYED RELEASE ORAL at 08:59

## 2019-03-30 RX ADMIN — GABAPENTIN 300 MG: 300 CAPSULE ORAL at 08:59

## 2019-03-30 RX ADMIN — CHLORHEXIDINE GLUCONATE 15 ML: 1.2 RINSE ORAL at 02:04

## 2019-03-30 RX ADMIN — GABAPENTIN 300 MG: 300 CAPSULE ORAL at 16:07

## 2019-03-30 RX ADMIN — FUROSEMIDE 40 MG: 40 TABLET ORAL at 08:59

## 2019-03-30 RX ADMIN — CARVEDILOL 25 MG: 12.5 TABLET, FILM COATED ORAL at 08:59

## 2019-03-30 RX ADMIN — HYDROCODONE BITARTRATE AND ACETAMINOPHEN 2 TABLET: 5; 325 TABLET ORAL at 17:06

## 2019-03-31 LAB
ABO + RH BLD: NORMAL
ABO + RH BLD: NORMAL
BH BB BLOOD EXPIRATION DATE: NORMAL
BH BB BLOOD EXPIRATION DATE: NORMAL
BH BB BLOOD TYPE BARCODE: 9500
BH BB BLOOD TYPE BARCODE: 9500
BH BB DISPENSE STATUS: NORMAL
BH BB DISPENSE STATUS: NORMAL
BH BB PRODUCT CODE: NORMAL
BH BB PRODUCT CODE: NORMAL
BH BB UNIT NUMBER: NORMAL
BH BB UNIT NUMBER: NORMAL
CROSSMATCH INTERPRETATION: NORMAL
CROSSMATCH INTERPRETATION: NORMAL
UNIT  ABO: NORMAL
UNIT  ABO: NORMAL
UNIT  RH: NORMAL
UNIT  RH: NORMAL

## 2019-04-01 NOTE — PROGRESS NOTES
Case Management Discharge Note    Final Note: Discharged to [facility].     Destination - Selection Complete      Service Provider Request Status Selected Services Address Phone Number Fax Number    [facility] Selected Skilled Nursing [address] [address] [phone]      Durable Medical Equipment      No service has been selected for the patient.      Dialysis/Infusion      No service has been selected for the patient.      Home Medical Care      No service has been selected for the patient.      Therapy      No service has been selected for the patient.      Community Resources      No service has been selected for the patient.        Transportation Services  Private: Car    Final Discharge Disposition Code: 03 - skilled nursing facility (SNF)

## 2019-04-08 ENCOUNTER — TELEPHONE (OUTPATIENT)
Dept: CARDIAC SURGERY | Facility: CLINIC | Age: 74
End: 2019-04-08

## 2019-04-08 NOTE — TELEPHONE ENCOUNTER
Gale from [facility] called to get verbal orders for PT and that he will f/u with cardiologist. I advised her this is fine to add this order. She voiced an understanding.

## 2019-04-24 ENCOUNTER — CLINICAL SUPPORT (OUTPATIENT)
Dept: CARDIAC SURGERY | Facility: CLINIC | Age: 74
End: 2019-04-24

## 2019-04-24 DIAGNOSIS — I10 ESSENTIAL (PRIMARY) HYPERTENSION: ICD-10-CM

## 2019-04-24 DIAGNOSIS — E11.40 TYPE 2 DIABETES MELLITUS WITH DIABETIC NEUROPATHY, UNSPECIFIED WHETHER LONG TERM INSULIN USE (HCC): ICD-10-CM

## 2019-04-24 DIAGNOSIS — Z48.812 ENCOUNTER FOR SURGICAL AFTERCARE FOLLOWING SURGERY OF CIRCULATORY SYSTEM: Primary | ICD-10-CM

## 2019-04-24 DIAGNOSIS — M62.81 MUSCLE WEAKNESS (GENERALIZED): ICD-10-CM

## 2019-04-24 PROCEDURE — G0180 MD CERTIFICATION HHA PATIENT: HCPCS | Performed by: THORACIC SURGERY (CARDIOTHORACIC VASCULAR SURGERY)

## 2019-04-29 ENCOUNTER — HOSPITAL ENCOUNTER (OUTPATIENT)
Dept: CARDIOLOGY | Facility: HOSPITAL | Age: 74
Discharge: HOME | End: 2019-04-29
Admitting: INTERNAL MEDICINE

## 2019-04-29 ENCOUNTER — OFFICE VISIT (OUTPATIENT)
Dept: CARDIOLOGY | Facility: CLINIC | Age: 74
End: 2019-04-29

## 2019-04-29 VITALS
WEIGHT: 295 LBS | HEART RATE: 83 BPM | BODY MASS INDEX: 46.3 KG/M2 | HEIGHT: 67 IN | SYSTOLIC BLOOD PRESSURE: 142 MMHG | DIASTOLIC BLOOD PRESSURE: 82 MMHG

## 2019-04-29 DIAGNOSIS — I25.10 CORONARY ARTERY DISEASE INVOLVING NATIVE CORONARY ARTERY OF NATIVE HEART WITHOUT ANGINA PECTORIS: Primary | ICD-10-CM

## 2019-04-29 DIAGNOSIS — R60.0 UNILATERAL EDEMA OF LOWER EXTREMITY: ICD-10-CM

## 2019-04-29 DIAGNOSIS — I25.10 CORONARY ARTERY DISEASE INVOLVING NATIVE CORONARY ARTERY OF NATIVE HEART WITHOUT ANGINA PECTORIS: ICD-10-CM

## 2019-04-29 LAB
BH CV LOWER VASCULAR LEFT COMMON FEMORAL AUGMENT: NORMAL
BH CV LOWER VASCULAR LEFT COMMON FEMORAL COMPETENT: NORMAL
BH CV LOWER VASCULAR LEFT COMMON FEMORAL COMPRESS: NORMAL
BH CV LOWER VASCULAR LEFT COMMON FEMORAL PHASIC: NORMAL
BH CV LOWER VASCULAR LEFT COMMON FEMORAL SPONT: NORMAL
BH CV LOWER VASCULAR RIGHT COMMON FEMORAL AUGMENT: NORMAL
BH CV LOWER VASCULAR RIGHT COMMON FEMORAL COMPETENT: NORMAL
BH CV LOWER VASCULAR RIGHT COMMON FEMORAL COMPRESS: NORMAL
BH CV LOWER VASCULAR RIGHT COMMON FEMORAL PHASIC: NORMAL
BH CV LOWER VASCULAR RIGHT COMMON FEMORAL SPONT: NORMAL
BH CV LOWER VASCULAR RIGHT DISTAL FEMORAL COMPRESS: NORMAL
BH CV LOWER VASCULAR RIGHT GASTRONEMIUS COMPRESS: NORMAL
BH CV LOWER VASCULAR RIGHT GREATER SAPH AK COMPRESS: NORMAL
BH CV LOWER VASCULAR RIGHT GREATER SAPH BK COMPRESS: NORMAL
BH CV LOWER VASCULAR RIGHT LESSER SAPH COMPRESS: NORMAL
BH CV LOWER VASCULAR RIGHT MID FEMORAL AUGMENT: NORMAL
BH CV LOWER VASCULAR RIGHT MID FEMORAL COMPETENT: NORMAL
BH CV LOWER VASCULAR RIGHT MID FEMORAL COMPRESS: NORMAL
BH CV LOWER VASCULAR RIGHT MID FEMORAL PHASIC: NORMAL
BH CV LOWER VASCULAR RIGHT MID FEMORAL SPONT: NORMAL
BH CV LOWER VASCULAR RIGHT PERONEAL COMPRESS: NORMAL
BH CV LOWER VASCULAR RIGHT POPLITEAL AUGMENT: NORMAL
BH CV LOWER VASCULAR RIGHT POPLITEAL COMPETENT: NORMAL
BH CV LOWER VASCULAR RIGHT POPLITEAL COMPRESS: NORMAL
BH CV LOWER VASCULAR RIGHT POPLITEAL PHASIC: NORMAL
BH CV LOWER VASCULAR RIGHT POPLITEAL SPONT: NORMAL
BH CV LOWER VASCULAR RIGHT POSTERIOR TIBIAL COMPRESS: NORMAL
BH CV LOWER VASCULAR RIGHT PROXIMAL FEMORAL COMPRESS: NORMAL
BH CV LOWER VASCULAR RIGHT SAPHENOFEMORAL JUNCTION AUGMENT: NORMAL
BH CV LOWER VASCULAR RIGHT SAPHENOFEMORAL JUNCTION COMPETENT: NORMAL
BH CV LOWER VASCULAR RIGHT SAPHENOFEMORAL JUNCTION COMPRESS: NORMAL
BH CV LOWER VASCULAR RIGHT SAPHENOFEMORAL JUNCTION PHASIC: NORMAL
BH CV LOWER VASCULAR RIGHT SAPHENOFEMORAL JUNCTION SPONT: NORMAL

## 2019-04-29 PROCEDURE — 93000 ELECTROCARDIOGRAM COMPLETE: CPT | Performed by: INTERNAL MEDICINE

## 2019-04-29 PROCEDURE — 93971 EXTREMITY STUDY: CPT

## 2019-04-29 PROCEDURE — 99213 OFFICE O/P EST LOW 20 MIN: CPT | Performed by: INTERNAL MEDICINE

## 2019-04-29 NOTE — PROGRESS NOTES
Subjective:     Encounter Date: 04/29/19      Patient ID: Bird Gallegos is a 74 y.o. male.    Chief Complaint:  This is a 73-year-old man with past medical history of obstructive sleep apnea, diabetes on oral medications, hypertension.  I originally met him in March 2019 around the time his  primary care physician noted a significant murmur.   He had an echocardiogram at [place] which showed severe aortic stenosis with a mean gradient of 45 mmHg and a peak velocity of 4.5 m/s squared, with a calculated KELSEA of 0.51 cm².  He had normal systolic function.  He was referred to me and described increasing dyspnea on exertion.  He underwent left heart catheterization as an outpatient which showed single-vessel coronary disease of the OM 2.  Given coronary disease and severe aortic stenosis, the surgeons performed traditional surgical AVR with single-vessel vein graft bypass to the OM.  He tolerated this procedure quite well and was discharged relatively soon afterwards with outpatient physical therapy.  He returns today for follow-up.  He drove himself to the appointment.  He really has no complaints.  He is walking about 0.16 miles a day for exercise he has no chest pain or shortness of breath.  He has some right-sided chest paresthesias related to the incision.  He does note pain in his upper left thigh along the palpable cord related to the vein graft harvesting.    He is a former .  He quit smoking 30 years ago.  He is a recent .  His daughter is a former nurse.         Coronary Artery Disease   Symptoms include leg swelling. Pertinent negatives include no chest pain or palpitations.   Hypertension   Pertinent negatives include no chest pain, orthopnea or palpitations.              Summary   The ejection fraction biplane was calculated at 62%   There is moderate septal and mild posterior wall hypertrophy.   Overall left ventricular function is normal.   No regional wall motion abnormalites noted.    Mildly dilated left atrium.   Severe aortic stenosis is present.   Mild mitral regurgitation is present.   Mild tricuspid regurgitation.   Right ventricular systolic pressure of 46 mmHg.     Any valve disease noted in the report is non-rheumatic unless otherwise   specifically noted.     Findings     Left Ventricle   The ejection fraction biplane was calculated at 62%   Left ventricular size is normal.   There is moderate septal and mild posterior wall hypertrophy.   Overall left ventricular function is normal.   No regional wall motion abnormalites noted.   Normal diastolic filling pattern for age.     Left Atrium   Mildly dilated left atrium.     Right Ventricle   Normal right ventricular size and function.     Right Atrium   The right atrial chamber size appears normal.     Aortic Valve   The peak instantaneous gradient of the aortic valve is 88 mmHg. The mean   gradient of the aortic valve is 58 mmHg. The aortic valve area by VTI, is   calculated at .51 cm2.   The aortic valve leaflets were not well visualized.   No evidence of aortic valve regurgitation.   Severe aortic stenosis is present.     Mitral Valve   Structurally normal mitral valve.   There is no evidence of mitral valve prolapse noted.   Mild mitral regurgitation is present.   No evidence of mitral valve stenosis.     Tricuspid Valve   Right ventricular systolic pressure of 46 mmHg.   Tricuspid valve was not well visualized.   Mild tricuspid regurgitation.   No evidence of tricuspid stenosis.     Pulmonic Valve   The pulmonic valve was not well visualized .   No evidence of pulmonic regurgitation.   No evidence of pulmonic valve stenosis.     Pericardium   There is no evidence of pericardial effusion.   A pericardial fat pad is visualized.     Great Vessels   Aortic root is not well visualized.   Aorta was not clearly visualized.   Aortic root dimension within normal limits.    M-Mode/2D Measurements & Calculations     LV Diastolic Dimension:  LV  Systolic Dimension:   LA Dimension: 4 cmAO Root   5.06 cm                  3.64 cm                  Dimension: 3.7 cmLA Area:   LV PW Diastolic: 1.32 cm LV Volume Diastolic: 154 30 cm^2   Septum Diastolic: 1.68   ml   cm                       LV Volume Systolic: 58                            ml                            LV EDV/LV EDV Index: 154                            ml/65 m^2LV ESV/LV ESV   LV Area Diastolic: 38.5  Index: 58 ml/24 m^2      LA/Aorta: 1.08   cm^2                     EF Calculated: 62.3 %   LV Area Systolic: 22.8                            LA volume: 105ml   cm^2                     LV Length: 9.36 cm       LA volume/Index: 44ml/m^2                                                     RA Area: 18 cm^2                            LVOT: 1.6 cm             RA volume: 43 ml                                                     RA volume index: 18 ml/m2    Doppler Measurements & Calculations     MV Peak E-Wave: 0.49  AV Peak Velocity: 4.52 m/s   LVOT Peak Velocity: 1.25   m/s                   AV Peak Gradient: 81.72 mmHg m/s   MV Peak A-Wave: 0.76  AV Mean Velocity: 2.97 m/s   LVOT Mean Velocity: 0.75   m/s                   AV Mean Gradient: 46 mmHg    m/s   MV E/A Ratio: 0.65    AV VTI: 104 cm               LVOT Peak Gradient: 5                         AV Area (Continuity):0.58    mmHgLVOT Mean Gradient: 3                         cm^2                         mmHg                                                      Estimated RVSP: 46 mmHg                         LVOT VTI: 29.9 cm            Estimated RAP:3 mmHg     MV E' Septal          Estimated PASP: 45.51 mmHg   Velocity: 0.1 m/s                                  TR Velocity:3.26 m/s   MV E' Lateral                                      TR Gradient:42.51 mmHg   Velocity: 0.06 m/s    Qs:60.12                     TV TAPSE:26 mm   MV E/E' septal: 4.97   MV E/E' lateral: 7.79    The following portions of the patient's history were reviewed and updated as  appropriate: allergies, current medications, past family history, past medical history, past social history, past surgical history and problem list.    Past Medical History:   Diagnosis Date   • Anxiety and depression    • Aortic stenosis    • Arthritis    • Coronary artery disease    • Diabetes mellitus (CMS/HCC)    • Diabetic neuropathy (CMS/HCC)    • Diverticulosis    • COLVIN (dyspnea on exertion)    • GERD (gastroesophageal reflux disease)    • Heart murmur    • History of cellulitis     LEFT LEG   • Hypertension    • STEF (obstructive sleep apnea)    • STEF on auto CPAP        Family History   Problem Relation Age of Onset   • Heart disease Mother    • Cancer Father    • Hypertension Brother    • Hypertension Brother    • Malig Hyperthermia Neg Hx        Social History     Socioeconomic History   • Marital status:      Spouse name: Not on file   • Number of children: Not on file   • Years of education: Not on file   • Highest education level: Not on file   Tobacco Use   • Smoking status: Former Smoker     Packs/day: 0.50     Types: Cigarettes     Last attempt to quit:      Years since quittin.3   • Smokeless tobacco: Never Used   • Tobacco comment: Smoked between the ages of 25 and 40.   Substance and Sexual Activity   • Alcohol use: No   • Drug use: No   • Sexual activity: Defer       Allergies   Allergen Reactions   • Contrast Dye Hives     Hives and rash reported       Past Surgical History:   Procedure Laterality Date   • BACK SURGERY      X2   • CARDIAC CATHETERIZATION N/A 3/21/2019    Procedure: Coronary angiography;  Surgeon: Wendy Braxton MD;  Location: [facility] INVASIVE LOCATION;  Service: Cardiology   • CORONARY ARTERY BYPASS GRAFT WITH AORTIC VALVE REPAIR/REPLACEMENT N/A 3/26/2019    Procedure: INTRAOP QUIQUE; CORONARY ARTERY BYPASS X 1 UTILIZING ENDOSCOPICALLY HARVESTED RIGHT GREATER SAPHENOUS VEIN; AORTIC VALVE REPLACEMENT AND PRP.;  Surgeon: Alexx Gandara MD;  Location: [facility]  MAIN OR;  Service: Cardiothoracic   • POSTERIOR CERVICAL FUSION     • TENDON REPAIR Left     KNEE   • WRIST SURGERY Bilateral        Review of Systems   Cardiovascular: Positive for leg swelling. Negative for chest pain, dyspnea on exertion, irregular heartbeat, near-syncope, orthopnea, palpitations and syncope.   Respiratory: Positive for snoring.    Neurological: Positive for numbness and paresthesias.         ECG 12 Lead  Date/Time: 4/29/2019 12:41 PM  Performed by: Wendy Braxton MD  Authorized by: Wendy Braxton MD   Comparison: compared with previous ECG from 3/28/2019  Similar to previous ECG  Rhythm: sinus rhythm  Rate: normal  Conduction: conduction normal  ST Segments: ST segments normal  T flattening: I, aVL, V5 and V6  Other findings: non-specific ST-T wave changes    Clinical impression: abnormal EKG               Objective:     Physical Exam  GEN: no distress, alert and oriented  Eyes: normal sclera, normal lids and lashes  HENT: moist mucus membranes,   Lungs CTAB, no rales or wheezes  Chest: no abnormalities  Neck: Too large to evaluate JVD.  CV: RRR, left ear murmur has resolved  Abdo: Obese soft,  Nontender, nondistended  Extr: Right greater than left lower extremity edema, red palpable cord in the right thigh  Skin: no rash, warm, dry  Heme/Lymph: no bruising    Psych: organized thought, normal behavior and affect    Lab Review:         Assessment:          Diagnosis Plan   1. Coronary artery disease involving native coronary artery of native heart without angina pectoris  Duplex Venous Lower Extremity - Left CAR    Ambulatory Referral to Cardiac Rehab   2. Unilateral edema of lower extremity  Duplex Venous Lower Extremity - Right CAR          Plan:         This is a 73-year-old man with prior severe AS and singlevessel CAD, s/p  SAVR with SVG to OM2     1. Aortic Stenosis  - bioprosthetic AVR and SVG to OM2 with Dr. Cummins  - LUCILA, ATorva, Coreg 25mg daily.   - Lasix 40 daily  - Atorva 10- will  increase to 40 at the next visit  -  Cardiac rehab referral outpatient    2. RLE edema  - likely related to vein harvesting but given pain and palpable cord, will get doppler today     3. DM- metformin    Dr. Lopez, thank you for referring this kind patient to me.  He did very well postoperatively and continues to do well today.  I have encouraged him to join cardiac rehab but geographically, this may be difficult for him.  Otherwise he should start exercising at home by walking up to 30 minutes a day. Most likely his RLE edema is related to the vein graft harvesting but I will rule out DVT today with a doppler.    He should follow back with me in 3 months.     Update: Doppler study was normal. Will double lasix to 80 mg daily x 3 days and elevate feet.     Wendy Braxton MD  04/29/19

## 2019-05-02 ENCOUNTER — OFFICE VISIT (OUTPATIENT)
Dept: CARDIAC SURGERY | Facility: CLINIC | Age: 74
End: 2019-05-02

## 2019-05-02 VITALS
TEMPERATURE: 98.2 F | OXYGEN SATURATION: 96 % | HEART RATE: 87 BPM | WEIGHT: 295 LBS | HEIGHT: 67 IN | BODY MASS INDEX: 46.3 KG/M2 | SYSTOLIC BLOOD PRESSURE: 162 MMHG | RESPIRATION RATE: 20 BRPM | DIASTOLIC BLOOD PRESSURE: 83 MMHG

## 2019-05-02 DIAGNOSIS — Z95.1 S/P CABG X 1: ICD-10-CM

## 2019-05-02 DIAGNOSIS — Z95.2 S/P AVR (AORTIC VALVE REPLACEMENT): Primary | ICD-10-CM

## 2019-05-02 PROCEDURE — 99024 POSTOP FOLLOW-UP VISIT: CPT | Performed by: NURSE PRACTITIONER

## 2019-05-02 NOTE — PROGRESS NOTES
"CARDIOVASCULAR SURGERY FOLLOW-UP PROGRESS NOTE  Chief Complaint: Postop follow-up        HPI:   Dear Dr. Lopez, Mariya Dean MD and colleagues:    It was nice to see Bird Gallegos in follow up 5 weeks after surgery.  As you know, he is a 74 y.o. male with CAD and aortic stenosis who underwent CABG x1 and tissue AVR on 3/26/2019 with Dr. Gandara. He did well postoperatively and continues to do well. He comes in today complaining of nothing.  His activity level has been good, he states that he does not intend to pursue cardiac rehab, he has been walking in his neighborhood frequently and is doing light weights at [employer].  From a surgical standpoint, the sternal and right leg incision is well approximated without erythema, edema, or drainage.  The sternum is stable to palpation, and the patient denies any popping or clicking with deep inspiration or coughing.  He does have a significant hematoma in the right thigh proximal to his incision, there is no erythema or warmth, he states that it has slightly improved, Dr. Braxton ordered a Doppler study that demonstrated no DVT.  He also continues to have significant bilateral lower extremity edema, his Lasix has been increased to 80 mg daily for the next week by his cardiologist.    Physical Exam:         /83 (BP Location: Right arm, Patient Position: Sitting, Cuff Size: Adult)   Pulse 87   Temp 98.2 °F (36.8 °C) (Axillary)   Resp 20   Ht 170.2 cm (67\")   Wt 134 kg (295 lb)   SpO2 96%   BMI 46.20 kg/m²   Heart:  regular rate and rhythm, S1, S2 normal, no murmur, click, rub or gallop  Lungs:  clear to auscultation bilaterally  Extremities:  1+ lower extremity edema bilaterally  Incision(s):  mid chest healing well, right leg healing well, sternum stable    Assessment/Plan:     S/P CABG and AVR. Overall, he is doing well.    No significant post-op complications    Keep incisions clean and dry  Follow-up as scheduled with cardiology  Follow-up as " scheduled with PCP  Follow-up with CT surgery prn    Return to office if the right hematoma is not resolving, becomes reddened or warm, or if the incision exhibits signs of infection    No restrictions of activity.    Return to clinic if any signs or symptoms of infection or sternal instability develop       Thank you for allowing me to participate in the care of your patient.    Regards,  KEVIN Chase

## 2019-05-15 ENCOUNTER — TREATMENT (OUTPATIENT)
Dept: CARDIAC REHAB | Facility: HOSPITAL | Age: 74
End: 2019-05-15

## 2019-05-15 DIAGNOSIS — Z95.2 S/P AORTIC VALVE REPLACEMENT: Primary | ICD-10-CM

## 2019-05-15 PROCEDURE — 93798 PHYS/QHP OP CAR RHAB W/ECG: CPT

## 2019-05-17 ENCOUNTER — TREATMENT (OUTPATIENT)
Dept: CARDIAC REHAB | Facility: HOSPITAL | Age: 74
End: 2019-05-17

## 2019-05-17 DIAGNOSIS — Z95.2 S/P AORTIC VALVE REPLACEMENT: Primary | ICD-10-CM

## 2019-05-17 PROCEDURE — 93798 PHYS/QHP OP CAR RHAB W/ECG: CPT

## 2019-05-20 ENCOUNTER — TREATMENT (OUTPATIENT)
Dept: CARDIAC REHAB | Facility: HOSPITAL | Age: 74
End: 2019-05-20

## 2019-05-20 DIAGNOSIS — Z95.2 S/P AORTIC VALVE REPLACEMENT: Primary | ICD-10-CM

## 2019-05-20 PROCEDURE — 93798 PHYS/QHP OP CAR RHAB W/ECG: CPT

## 2019-05-22 ENCOUNTER — TREATMENT (OUTPATIENT)
Dept: CARDIAC REHAB | Facility: HOSPITAL | Age: 74
End: 2019-05-22

## 2019-05-22 DIAGNOSIS — Z95.2 S/P AORTIC VALVE REPLACEMENT: ICD-10-CM

## 2019-05-22 DIAGNOSIS — Z95.1 S/P CABG X 1: Primary | ICD-10-CM

## 2019-05-22 PROCEDURE — 93798 PHYS/QHP OP CAR RHAB W/ECG: CPT

## 2019-05-24 ENCOUNTER — TREATMENT (OUTPATIENT)
Dept: CARDIAC REHAB | Facility: HOSPITAL | Age: 74
End: 2019-05-24

## 2019-05-24 DIAGNOSIS — Z95.1 S/P CABG X 1: Primary | ICD-10-CM

## 2019-05-24 PROCEDURE — 93798 PHYS/QHP OP CAR RHAB W/ECG: CPT

## 2019-05-29 ENCOUNTER — TREATMENT (OUTPATIENT)
Dept: CARDIAC REHAB | Facility: HOSPITAL | Age: 74
End: 2019-05-29

## 2019-05-29 DIAGNOSIS — Z95.1 S/P CABG X 1: Primary | ICD-10-CM

## 2019-05-29 PROCEDURE — 93798 PHYS/QHP OP CAR RHAB W/ECG: CPT

## 2019-05-31 ENCOUNTER — TREATMENT (OUTPATIENT)
Dept: CARDIAC REHAB | Facility: HOSPITAL | Age: 74
End: 2019-05-31

## 2019-05-31 DIAGNOSIS — Z95.1 S/P CABG X 1: Primary | ICD-10-CM

## 2019-05-31 DIAGNOSIS — Z95.2 S/P AORTIC VALVE REPLACEMENT: ICD-10-CM

## 2019-05-31 PROCEDURE — 93798 PHYS/QHP OP CAR RHAB W/ECG: CPT

## 2019-06-03 ENCOUNTER — TREATMENT (OUTPATIENT)
Dept: CARDIAC REHAB | Facility: HOSPITAL | Age: 74
End: 2019-06-03

## 2019-06-03 DIAGNOSIS — Z95.1 S/P CABG X 1: Primary | ICD-10-CM

## 2019-06-03 DIAGNOSIS — Z95.2 S/P AORTIC VALVE REPLACEMENT: ICD-10-CM

## 2019-06-03 PROCEDURE — 93798 PHYS/QHP OP CAR RHAB W/ECG: CPT

## 2019-06-05 ENCOUNTER — TREATMENT (OUTPATIENT)
Dept: CARDIAC REHAB | Facility: HOSPITAL | Age: 74
End: 2019-06-05

## 2019-06-05 DIAGNOSIS — Z95.2 S/P AORTIC VALVE REPLACEMENT: ICD-10-CM

## 2019-06-05 DIAGNOSIS — Z95.1 S/P CABG X 1: Primary | ICD-10-CM

## 2019-06-05 PROCEDURE — 93798 PHYS/QHP OP CAR RHAB W/ECG: CPT

## 2019-06-07 ENCOUNTER — TREATMENT (OUTPATIENT)
Dept: CARDIAC REHAB | Facility: HOSPITAL | Age: 74
End: 2019-06-07

## 2019-06-07 DIAGNOSIS — Z95.1 S/P CABG X 1: Primary | ICD-10-CM

## 2019-06-07 DIAGNOSIS — Z95.2 S/P AORTIC VALVE REPLACEMENT: ICD-10-CM

## 2019-06-07 PROCEDURE — 93798 PHYS/QHP OP CAR RHAB W/ECG: CPT

## 2019-06-12 ENCOUNTER — TREATMENT (OUTPATIENT)
Dept: CARDIAC REHAB | Facility: HOSPITAL | Age: 74
End: 2019-06-12

## 2019-06-12 DIAGNOSIS — Z95.1 S/P CABG X 1: Primary | ICD-10-CM

## 2019-06-12 DIAGNOSIS — Z95.2 S/P AORTIC VALVE REPLACEMENT: ICD-10-CM

## 2019-06-12 PROCEDURE — 93798 PHYS/QHP OP CAR RHAB W/ECG: CPT

## 2019-06-17 ENCOUNTER — TREATMENT (OUTPATIENT)
Dept: CARDIAC REHAB | Facility: HOSPITAL | Age: 74
End: 2019-06-17

## 2019-06-17 DIAGNOSIS — Z95.2 S/P AORTIC VALVE REPLACEMENT: ICD-10-CM

## 2019-06-17 DIAGNOSIS — Z95.1 S/P CABG X 1: Primary | ICD-10-CM

## 2019-06-17 PROCEDURE — 93798 PHYS/QHP OP CAR RHAB W/ECG: CPT

## 2019-06-19 ENCOUNTER — TREATMENT (OUTPATIENT)
Dept: CARDIAC REHAB | Facility: HOSPITAL | Age: 74
End: 2019-06-19

## 2019-06-19 DIAGNOSIS — Z95.1 S/P CABG X 1: Primary | ICD-10-CM

## 2019-06-19 PROCEDURE — 93798 PHYS/QHP OP CAR RHAB W/ECG: CPT

## 2019-06-21 ENCOUNTER — TREATMENT (OUTPATIENT)
Dept: CARDIAC REHAB | Facility: HOSPITAL | Age: 74
End: 2019-06-21

## 2019-06-21 DIAGNOSIS — Z95.2 S/P AORTIC VALVE REPLACEMENT: ICD-10-CM

## 2019-06-21 DIAGNOSIS — Z95.1 S/P CABG X 1: Primary | ICD-10-CM

## 2019-06-21 PROCEDURE — 93798 PHYS/QHP OP CAR RHAB W/ECG: CPT

## 2019-06-24 ENCOUNTER — TREATMENT (OUTPATIENT)
Dept: CARDIAC REHAB | Facility: HOSPITAL | Age: 74
End: 2019-06-24

## 2019-06-24 DIAGNOSIS — Z95.2 S/P AORTIC VALVE REPLACEMENT: ICD-10-CM

## 2019-06-24 DIAGNOSIS — Z95.1 S/P CABG X 1: Primary | ICD-10-CM

## 2019-06-24 PROCEDURE — 93798 PHYS/QHP OP CAR RHAB W/ECG: CPT

## 2019-06-26 ENCOUNTER — TREATMENT (OUTPATIENT)
Dept: CARDIAC REHAB | Facility: HOSPITAL | Age: 74
End: 2019-06-26

## 2019-06-26 DIAGNOSIS — Z95.1 S/P CABG X 1: Primary | ICD-10-CM

## 2019-06-26 DIAGNOSIS — Z95.2 S/P AORTIC VALVE REPLACEMENT: ICD-10-CM

## 2019-06-26 PROCEDURE — 93798 PHYS/QHP OP CAR RHAB W/ECG: CPT

## 2019-06-28 ENCOUNTER — TREATMENT (OUTPATIENT)
Dept: CARDIAC REHAB | Facility: HOSPITAL | Age: 74
End: 2019-06-28

## 2019-06-28 DIAGNOSIS — Z95.1 S/P CABG X 1: Primary | ICD-10-CM

## 2019-06-28 DIAGNOSIS — Z95.2 S/P AORTIC VALVE REPLACEMENT: ICD-10-CM

## 2019-06-28 PROCEDURE — 93798 PHYS/QHP OP CAR RHAB W/ECG: CPT

## 2019-07-01 ENCOUNTER — APPOINTMENT (OUTPATIENT)
Dept: CARDIAC REHAB | Facility: HOSPITAL | Age: 74
End: 2019-07-01

## 2019-07-03 ENCOUNTER — APPOINTMENT (OUTPATIENT)
Dept: CARDIAC REHAB | Facility: HOSPITAL | Age: 74
End: 2019-07-03

## 2019-07-05 ENCOUNTER — APPOINTMENT (OUTPATIENT)
Dept: CARDIAC REHAB | Facility: HOSPITAL | Age: 74
End: 2019-07-05

## 2019-07-08 ENCOUNTER — APPOINTMENT (OUTPATIENT)
Dept: CARDIAC REHAB | Facility: HOSPITAL | Age: 74
End: 2019-07-08

## 2019-07-10 ENCOUNTER — APPOINTMENT (OUTPATIENT)
Dept: CARDIAC REHAB | Facility: HOSPITAL | Age: 74
End: 2019-07-10

## 2019-07-12 ENCOUNTER — APPOINTMENT (OUTPATIENT)
Dept: CARDIAC REHAB | Facility: HOSPITAL | Age: 74
End: 2019-07-12

## 2019-07-15 ENCOUNTER — APPOINTMENT (OUTPATIENT)
Dept: CARDIAC REHAB | Facility: HOSPITAL | Age: 74
End: 2019-07-15

## 2019-07-17 ENCOUNTER — APPOINTMENT (OUTPATIENT)
Dept: CARDIAC REHAB | Facility: HOSPITAL | Age: 74
End: 2019-07-17

## 2019-07-19 ENCOUNTER — APPOINTMENT (OUTPATIENT)
Dept: CARDIAC REHAB | Facility: HOSPITAL | Age: 74
End: 2019-07-19

## 2019-07-22 ENCOUNTER — APPOINTMENT (OUTPATIENT)
Dept: CARDIAC REHAB | Facility: HOSPITAL | Age: 74
End: 2019-07-22

## 2019-07-24 ENCOUNTER — APPOINTMENT (OUTPATIENT)
Dept: CARDIAC REHAB | Facility: HOSPITAL | Age: 74
End: 2019-07-24

## 2019-07-26 ENCOUNTER — APPOINTMENT (OUTPATIENT)
Dept: CARDIAC REHAB | Facility: HOSPITAL | Age: 74
End: 2019-07-26

## 2019-07-29 ENCOUNTER — APPOINTMENT (OUTPATIENT)
Dept: CARDIAC REHAB | Facility: HOSPITAL | Age: 74
End: 2019-07-29

## 2019-07-30 ENCOUNTER — OFFICE VISIT (OUTPATIENT)
Dept: CARDIOLOGY | Facility: CLINIC | Age: 74
End: 2019-07-30

## 2019-07-30 VITALS
SYSTOLIC BLOOD PRESSURE: 142 MMHG | WEIGHT: 286 LBS | HEIGHT: 69 IN | HEART RATE: 73 BPM | DIASTOLIC BLOOD PRESSURE: 74 MMHG | BODY MASS INDEX: 42.36 KG/M2

## 2019-07-30 DIAGNOSIS — I35.0 AORTIC VALVE STENOSIS, ETIOLOGY OF CARDIAC VALVE DISEASE UNSPECIFIED: Primary | ICD-10-CM

## 2019-07-30 DIAGNOSIS — E78.5 HYPERLIPIDEMIA, UNSPECIFIED HYPERLIPIDEMIA TYPE: ICD-10-CM

## 2019-07-30 DIAGNOSIS — E11.9 TYPE 2 DIABETES MELLITUS WITHOUT COMPLICATION, WITHOUT LONG-TERM CURRENT USE OF INSULIN (HCC): ICD-10-CM

## 2019-07-30 DIAGNOSIS — I10 ESSENTIAL HYPERTENSION: ICD-10-CM

## 2019-07-30 DIAGNOSIS — I25.10 CORONARY ARTERY DISEASE INVOLVING NATIVE CORONARY ARTERY OF NATIVE HEART WITHOUT ANGINA PECTORIS: ICD-10-CM

## 2019-07-30 PROCEDURE — 93000 ELECTROCARDIOGRAM COMPLETE: CPT | Performed by: INTERNAL MEDICINE

## 2019-07-30 PROCEDURE — 99214 OFFICE O/P EST MOD 30 MIN: CPT | Performed by: INTERNAL MEDICINE

## 2019-07-30 NOTE — PROGRESS NOTES
Subjective:     Encounter Date: 07/30/19      Patient ID: Bird Gallegos is a 74 y.o. male.    Chief Complaint:  This is a 73-year-old man with past medical history of obstructive sleep apnea, diabetes on oral medications, hypertension.  I originally met him in March 2019 around the time his  primary care physician noted a significant murmur.   He had an echocardiogram at [place] which showed severe aortic stenosis with a mean gradient of 45 mmHg and a peak velocity of 4.5 m/s squared, with a calculated KELSEA of 0.51 cm².  He had normal systolic function.  He was referred to me and described increasing dyspnea on exertion.  He underwent left heart catheterization as an outpatient which showed single-vessel coronary disease of the OM 2.  Given coronary disease and severe aortic stenosis, the surgeons performed traditional surgical AVR with single-vessel vein graft bypass to the OM.  He tolerated this procedure quite well and was discharged relatively soon afterwards with outpatient physical therapy.  Today he's feeling well. He went on a train trip from [place] to [place] with his brother and loved it.   He has LE edema that is stable. No weeping, but they are tense. HE walks and is trying to cut back on [employer] but hasnt lost weight.    He is a former .  He quit smoking 30 years ago.  He is a recent .  His daughter is a former nurse. Son is getting  in October.         Coronary Artery Disease   Symptoms include leg swelling. Pertinent negatives include no chest pain or palpitations.   Hypertension   Pertinent negatives include no chest pain, orthopnea or palpitations.       REVIEW OF SYSTEMS:   All systems reviewed.  Pertinent positives identified in HPI.  All other systems are negative.         Summary   The ejection fraction biplane was calculated at 62%   There is moderate septal and mild posterior wall hypertrophy.   Overall left ventricular function is normal.   No regional wall  motion abnormalites noted.   Mildly dilated left atrium.   Severe aortic stenosis is present.   Mild mitral regurgitation is present.   Mild tricuspid regurgitation.   Right ventricular systolic pressure of 46 mmHg.     Any valve disease noted in the report is non-rheumatic unless otherwise   specifically noted.     Findings     Left Ventricle   The ejection fraction biplane was calculated at 62%   Left ventricular size is normal.   There is moderate septal and mild posterior wall hypertrophy.   Overall left ventricular function is normal.   No regional wall motion abnormalites noted.   Normal diastolic filling pattern for age.     Left Atrium   Mildly dilated left atrium.     Right Ventricle   Normal right ventricular size and function.     Right Atrium   The right atrial chamber size appears normal.     Aortic Valve   The peak instantaneous gradient of the aortic valve is 88 mmHg. The mean   gradient of the aortic valve is 58 mmHg. The aortic valve area by VTI, is   calculated at .51 cm2.   The aortic valve leaflets were not well visualized.   No evidence of aortic valve regurgitation.   Severe aortic stenosis is present.     Mitral Valve   Structurally normal mitral valve.   There is no evidence of mitral valve prolapse noted.   Mild mitral regurgitation is present.   No evidence of mitral valve stenosis.     Tricuspid Valve   Right ventricular systolic pressure of 46 mmHg.   Tricuspid valve was not well visualized.   Mild tricuspid regurgitation.   No evidence of tricuspid stenosis.     Pulmonic Valve   The pulmonic valve was not well visualized .   No evidence of pulmonic regurgitation.   No evidence of pulmonic valve stenosis.     Pericardium   There is no evidence of pericardial effusion.   A pericardial fat pad is visualized.     Great Vessels   Aortic root is not well visualized.   Aorta was not clearly visualized.   Aortic root dimension within normal limits.    M-Mode/2D Measurements & Calculations     LV  Diastolic Dimension:  LV Systolic Dimension:   LA Dimension: 4 cmAO Root   5.06 cm                  3.64 cm                  Dimension: 3.7 cmLA Area:   LV PW Diastolic: 1.32 cm LV Volume Diastolic: 154 30 cm^2   Septum Diastolic: 1.68   ml   cm                       LV Volume Systolic: 58                            ml                            LV EDV/LV EDV Index: 154                            ml/65 m^2LV ESV/LV ESV   LV Area Diastolic: 38.5  Index: 58 ml/24 m^2      LA/Aorta: 1.08   cm^2                     EF Calculated: 62.3 %   LV Area Systolic: 22.8                            LA volume: 105ml   cm^2                     LV Length: 9.36 cm       LA volume/Index: 44ml/m^2                                                     RA Area: 18 cm^2                            LVOT: 1.6 cm             RA volume: 43 ml                                                     RA volume index: 18 ml/m2    Doppler Measurements & Calculations     MV Peak E-Wave: 0.49  AV Peak Velocity: 4.52 m/s   LVOT Peak Velocity: 1.25   m/s                   AV Peak Gradient: 81.72 mmHg m/s   MV Peak A-Wave: 0.76  AV Mean Velocity: 2.97 m/s   LVOT Mean Velocity: 0.75   m/s                   AV Mean Gradient: 46 mmHg    m/s   MV E/A Ratio: 0.65    AV VTI: 104 cm               LVOT Peak Gradient: 5                         AV Area (Continuity):0.58    mmHgLVOT Mean Gradient: 3                         cm^2                         mmHg                                                      Estimated RVSP: 46 mmHg                         LVOT VTI: 29.9 cm            Estimated RAP:3 mmHg     MV E' Septal          Estimated PASP: 45.51 mmHg   Velocity: 0.1 m/s                                  TR Velocity:3.26 m/s   MV E' Lateral                                      TR Gradient:42.51 mmHg   Velocity: 0.06 m/s    Qs:60.12                     TV TAPSE:26 mm   MV E/E' septal: 4.97   MV E/E' lateral: 7.79    The following portions of the patient's history were  reviewed and updated as appropriate: allergies, current medications, past family history, past medical history, past social history, past surgical history and problem list.    Past Medical History:   Diagnosis Date   • Anxiety and depression    • Aortic stenosis    • Arthritis    • Coronary artery disease    • Diabetes mellitus (CMS/HCC)    • Diabetic neuropathy (CMS/HCC)    • Diverticulosis    • COLVIN (dyspnea on exertion)    • GERD (gastroesophageal reflux disease)    • Heart murmur    • History of cellulitis     LEFT LEG   • Hypertension    • STEF (obstructive sleep apnea)    • STEF on auto CPAP        Family History   Problem Relation Age of Onset   • Heart disease Mother    • Cancer Father    • Hypertension Brother    • Hypertension Brother    • Malig Hyperthermia Neg Hx        Social History     Socioeconomic History   • Marital status:      Spouse name: Not on file   • Number of children: Not on file   • Years of education: Not on file   • Highest education level: Not on file   Tobacco Use   • Smoking status: Former Smoker     Packs/day: 0.50     Types: Cigarettes     Last attempt to quit:      Years since quittin.6   • Smokeless tobacco: Never Used   • Tobacco comment: Smoked between the ages of 25 and 40.   Substance and Sexual Activity   • Alcohol use: No   • Drug use: No   • Sexual activity: Defer       Allergies   Allergen Reactions   • Contrast Dye Hives     Hives and rash reported       Past Surgical History:   Procedure Laterality Date   • BACK SURGERY      X2   • CARDIAC CATHETERIZATION N/A 3/21/2019    Procedure: Coronary angiography;  Surgeon: Wendy Braxton MD;  Location: [facility] INVASIVE LOC[facility];  Service: Cardiology   • CORONARY ARTERY BYPASS GRAFT WITH AORTIC VALVE REPAIR/REPLACEMENT N/A 3/26/2019    Procedure: INTRAOP QUIQUE; CORONARY ARTERY BYPASS X 1 UTILIZING ENDOSCOPICALLY HARVESTED RIGHT GREATER SAPHENOUS VEIN; AORTIC VALVE REPLACEMENT AND PRP.;  Surgeon: Alexx Gandara  MD;  Location: [facility] OR;  Service: Cardiothoracic   • POSTERIOR CERVICAL FUSION     • TENDON REPAIR Left     KNEE   • WRIST SURGERY Bilateral              ECG 12 Lead  Date/Time: 7/30/2019 12:06 PM  Performed by: Wendy Braxton MD  Authorized by: Wendy Braxton MD   Comparison: compared with previous ECG from 4/29/2019  Similar to previous ECG  Rhythm: sinus rhythm  Rate: normal  Conduction: non-specific intraventricular conduction delay  ST Segments: ST segments normal  T Waves: T waves normal  QRS axis: normal    Clinical impression: non-specific ECG               Objective:     Physical Exam  GEN: no distress, alert and oriented  Eyes: normal sclera, normal lids and lashes  HENT: moist mucus membranes,   Lungs CTAB, no rales or wheezes  Chest: no abnormalities  Neck: Too large to evaluate JVD.  CV: RRR, left ear murmur has resolved  Abdo: Obese soft,  Nontender, nondistended  Extr:b/l +2 pitting edema  Skin: no rash, warm, dry  Heme/Lymph: no bruising    Psych: organized thought, normal behavior and affect    Outpatient Encounter Medications as of 7/30/2019   Medication Sig Dispense Refill   • ALPRAZolam (XANAX) 0.5 MG tablet Take 0.25 mg by mouth At Night As Needed.     • ascorbic acid (VITAMIN C) 1000 MG tablet Take 1,000 mg by mouth Daily.     • atorvastatin (LIPITOR) 10 MG tablet Take 10 mg by mouth Daily.     • BABY ASPIRIN PO Take 81 mg by mouth Daily.     • carvedilol (COREG) 25 MG tablet Take 1 tablet by mouth Every 12 (Twelve) Hours. 60 tablet 2   • furosemide (LASIX) 40 MG tablet Take 40 mg by mouth Daily.     • gabapentin (NEURONTIN) 300 MG capsule Take 300 mg by mouth 3 (Three) Times a Day.     • glimepiride (AMARYL) 2 MG tablet Take 2 mg by mouth Every Morning Before Breakfast.     • loratadine (CLARITIN) 10 MG tablet Take 10 mg by mouth Daily.     • metFORMIN (GLUCOPHAGE) 1000 MG tablet Take 1,000 mg by mouth 2 (Two) Times a Day With Meals.     • potassium chloride (K-DUR,KLOR-CON) 10 MEQ CR  tablet Take 10 mEq by mouth Daily.     • sertraline (ZOLOFT) 50 MG tablet Take 50 mg by mouth Daily.     • tamsulosin (FLOMAX) 0.4 MG capsule 24 hr capsule Take 1 capsule by mouth Every Night.       No facility-administered encounter medications on file as of 7/30/2019.              Assessment:          Diagnosis Plan   1. Aortic valve stenosis, etiology of cardiac valve disease unspecified     2. Coronary artery disease involving native coronary artery of native heart without angina pectoris     3. Type 2 diabetes mellitus without complication, without long-term current use of insulin (CMS/[facility])     4. Essential hypertension     5. Hyperlipidemia, unspecified hyperlipidemia type                Plan:         This is a 73-year-old man with prior severe AS and singlevessel CAD, s/p  SAVR with SVG to OM2     1. Aortic Stenosis  - bioprosthetic AVR and SVG to OM2 with Dr. Cummins  - ASA, Atorva, Coreg 25mg daily.   - Lasix 40 daily-->increase to 80 daily.   - Atorva 10    2. HTN- controlled  3. HLD- on atorva  4. DM- metformin and glimeperide    Dr. Lopez, thank you for referring this kind patient to me.  He did very well postoperatively and continues to do well today.  I will see him back in 6 months in the office.         Wendy Braxton MD  07/30/19

## 2019-08-02 ENCOUNTER — APPOINTMENT (OUTPATIENT)
Dept: CARDIAC REHAB | Facility: HOSPITAL | Age: 74
End: 2019-08-02

## 2019-08-05 ENCOUNTER — APPOINTMENT (OUTPATIENT)
Dept: CARDIAC REHAB | Facility: HOSPITAL | Age: 74
End: 2019-08-05

## 2019-11-14 ENCOUNTER — HOSPITAL ENCOUNTER (INPATIENT)
Facility: HOSPITAL | Age: 74
LOS: 1 days | Discharge: HOME | End: 2019-11-16
Attending: EMERGENCY MEDICINE | Admitting: HOSPITALIST

## 2019-11-14 ENCOUNTER — APPOINTMENT (OUTPATIENT)
Dept: CT IMAGING | Facility: HOSPITAL | Age: 74
End: 2019-11-14

## 2019-11-14 DIAGNOSIS — R20.2 PARESTHESIAS: Primary | ICD-10-CM

## 2019-11-14 DIAGNOSIS — H53.9 VISION DISTURBANCE: ICD-10-CM

## 2019-11-14 DIAGNOSIS — I16.0 HYPERTENSIVE URGENCY: ICD-10-CM

## 2019-11-14 PROBLEM — E66.01 MORBID OBESITY WITH BMI OF 40.0-44.9, ADULT (HCC): Status: ACTIVE | Noted: 2019-11-14

## 2019-11-14 PROBLEM — E11.40 DIABETIC NEUROPATHY (HCC): Status: ACTIVE | Noted: 2019-11-14

## 2019-11-14 PROBLEM — I10 HYPERTENSION: Status: ACTIVE | Noted: 2019-11-14

## 2019-11-14 PROBLEM — E11.9 TYPE 2 DIABETES MELLITUS, WITHOUT LONG-TERM CURRENT USE OF INSULIN: Status: ACTIVE | Noted: 2019-11-14

## 2019-11-14 LAB
ALBUMIN SERPL-MCNC: 4.3 G/DL (ref 3.5–5.2)
ALBUMIN/GLOB SERPL: 1.3 G/DL
ALP SERPL-CCNC: 78 U/L (ref 39–117)
ALT SERPL W P-5'-P-CCNC: 16 U/L (ref 1–41)
ANION GAP SERPL CALCULATED.3IONS-SCNC: 13.8 MMOL/L (ref 5–15)
AST SERPL-CCNC: 19 U/L (ref 1–40)
BASOPHILS # BLD AUTO: 0.05 10*3/MM3 (ref 0–0.2)
BASOPHILS NFR BLD AUTO: 0.7 % (ref 0–1.5)
BILIRUB SERPL-MCNC: 0.6 MG/DL (ref 0.2–1.2)
BUN BLD-MCNC: 16 MG/DL (ref 8–23)
BUN/CREAT SERPL: 16.8 (ref 7–25)
CALCIUM SPEC-SCNC: 9.3 MG/DL (ref 8.6–10.5)
CHLORIDE SERPL-SCNC: 101 MMOL/L (ref 98–107)
CO2 SERPL-SCNC: 25.2 MMOL/L (ref 22–29)
CREAT BLD-MCNC: 0.95 MG/DL (ref 0.76–1.27)
DEPRECATED RDW RBC AUTO: 44.9 FL (ref 37–54)
EOSINOPHIL # BLD AUTO: 0.37 10*3/MM3 (ref 0–0.4)
EOSINOPHIL NFR BLD AUTO: 5 % (ref 0.3–6.2)
ERYTHROCYTE [DISTWIDTH] IN BLOOD BY AUTOMATED COUNT: 14.5 % (ref 12.3–15.4)
GFR SERPL CREATININE-BSD FRML MDRD: 77 ML/MIN/1.73
GLOBULIN UR ELPH-MCNC: 3.4 GM/DL
GLUCOSE BLD-MCNC: 158 MG/DL (ref 65–99)
GLUCOSE BLDC GLUCOMTR-MCNC: 119 MG/DL (ref 70–130)
HCT VFR BLD AUTO: 45.6 % (ref 37.5–51)
HGB BLD-MCNC: 15.6 G/DL (ref 13–17.7)
IMM GRANULOCYTES # BLD AUTO: 0.04 10*3/MM3 (ref 0–0.05)
IMM GRANULOCYTES NFR BLD AUTO: 0.5 % (ref 0–0.5)
LYMPHOCYTES # BLD AUTO: 1.47 10*3/MM3 (ref 0.7–3.1)
LYMPHOCYTES NFR BLD AUTO: 19.8 % (ref 19.6–45.3)
MCH RBC QN AUTO: 29.4 PG (ref 26.6–33)
MCHC RBC AUTO-ENTMCNC: 34.2 G/DL (ref 31.5–35.7)
MCV RBC AUTO: 85.9 FL (ref 79–97)
MONOCYTES # BLD AUTO: 0.58 10*3/MM3 (ref 0.1–0.9)
MONOCYTES NFR BLD AUTO: 7.8 % (ref 5–12)
NEUTROPHILS # BLD AUTO: 4.9 10*3/MM3 (ref 1.7–7)
NEUTROPHILS NFR BLD AUTO: 66.2 % (ref 42.7–76)
NRBC BLD AUTO-RTO: 0 /100 WBC (ref 0–0.2)
PLATELET # BLD AUTO: 177 10*3/MM3 (ref 140–450)
PMV BLD AUTO: 10.6 FL (ref 6–12)
POTASSIUM BLD-SCNC: 4.3 MMOL/L (ref 3.5–5.2)
PROT SERPL-MCNC: 7.7 G/DL (ref 6–8.5)
RBC # BLD AUTO: 5.31 10*6/MM3 (ref 4.14–5.8)
SODIUM BLD-SCNC: 140 MMOL/L (ref 136–145)
WBC NRBC COR # BLD: 7.41 10*3/MM3 (ref 3.4–10.8)

## 2019-11-14 PROCEDURE — 85025 COMPLETE CBC W/AUTO DIFF WBC: CPT | Performed by: NURSE PRACTITIONER

## 2019-11-14 PROCEDURE — 93005 ELECTROCARDIOGRAM TRACING: CPT | Performed by: EMERGENCY MEDICINE

## 2019-11-14 PROCEDURE — 80053 COMPREHEN METABOLIC PANEL: CPT | Performed by: NURSE PRACTITIONER

## 2019-11-14 PROCEDURE — 36415 COLL VENOUS BLD VENIPUNCTURE: CPT

## 2019-11-14 PROCEDURE — 70450 CT HEAD/BRAIN W/O DYE: CPT

## 2019-11-14 PROCEDURE — G0378 HOSPITAL OBSERVATION PER HR: HCPCS

## 2019-11-14 PROCEDURE — 99284 EMERGENCY DEPT VISIT MOD MDM: CPT

## 2019-11-14 PROCEDURE — 25010000002 HYDRALAZINE PER 20 MG: Performed by: EMERGENCY MEDICINE

## 2019-11-14 PROCEDURE — 82962 GLUCOSE BLOOD TEST: CPT

## 2019-11-14 PROCEDURE — 93010 ELECTROCARDIOGRAM REPORT: CPT | Performed by: INTERNAL MEDICINE

## 2019-11-14 PROCEDURE — 93005 ELECTROCARDIOGRAM TRACING: CPT | Performed by: NURSE PRACTITIONER

## 2019-11-14 RX ORDER — GABAPENTIN 300 MG/1
300 CAPSULE ORAL 3 TIMES DAILY
Status: DISCONTINUED | OUTPATIENT
Start: 2019-11-14 | End: 2019-11-16 | Stop reason: HOSPADM

## 2019-11-14 RX ORDER — CLONAZEPAM 1 MG/1
2 TABLET, ORALLY DISINTEGRATING ORAL NIGHTLY
Status: DISCONTINUED | OUTPATIENT
Start: 2019-11-14 | End: 2019-11-15

## 2019-11-14 RX ORDER — POTASSIUM CHLORIDE 750 MG/1
10 CAPSULE, EXTENDED RELEASE ORAL DAILY
Status: DISCONTINUED | OUTPATIENT
Start: 2019-11-15 | End: 2019-11-16 | Stop reason: HOSPADM

## 2019-11-14 RX ORDER — SODIUM CHLORIDE 0.9 % (FLUSH) 0.9 %
10 SYRINGE (ML) INJECTION AS NEEDED
Status: DISCONTINUED | OUTPATIENT
Start: 2019-11-14 | End: 2019-11-14

## 2019-11-14 RX ORDER — FUROSEMIDE 40 MG/1
40 TABLET ORAL DAILY
Status: DISCONTINUED | OUTPATIENT
Start: 2019-11-15 | End: 2019-11-16 | Stop reason: HOSPADM

## 2019-11-14 RX ORDER — ASPIRIN 300 MG/1
300 SUPPOSITORY RECTAL DAILY
Status: DISCONTINUED | OUTPATIENT
Start: 2019-11-15 | End: 2019-11-15

## 2019-11-14 RX ORDER — LABETALOL HYDROCHLORIDE 5 MG/ML
10 INJECTION, SOLUTION INTRAVENOUS
Status: DISCONTINUED | OUTPATIENT
Start: 2019-11-14 | End: 2019-11-16 | Stop reason: HOSPADM

## 2019-11-14 RX ORDER — ASPIRIN 81 MG/1
324 TABLET, CHEWABLE ORAL ONCE
Status: COMPLETED | OUTPATIENT
Start: 2019-11-14 | End: 2019-11-14

## 2019-11-14 RX ORDER — TAMSULOSIN HYDROCHLORIDE 0.4 MG/1
0.4 CAPSULE ORAL NIGHTLY
Status: DISCONTINUED | OUTPATIENT
Start: 2019-11-15 | End: 2019-11-16 | Stop reason: HOSPADM

## 2019-11-14 RX ORDER — CARVEDILOL 25 MG/1
25 TABLET ORAL EVERY 12 HOURS
Status: DISCONTINUED | OUTPATIENT
Start: 2019-11-14 | End: 2019-11-16 | Stop reason: HOSPADM

## 2019-11-14 RX ORDER — CLONAZEPAM 1 MG/1
2 TABLET, ORALLY DISINTEGRATING ORAL NIGHTLY
COMMUNITY
Start: 2019-09-24

## 2019-11-14 RX ORDER — ACETAMINOPHEN 650 MG/1
650 SUPPOSITORY RECTAL EVERY 4 HOURS PRN
Status: DISCONTINUED | OUTPATIENT
Start: 2019-11-14 | End: 2019-11-16 | Stop reason: HOSPADM

## 2019-11-14 RX ORDER — BISACODYL 10 MG
10 SUPPOSITORY, RECTAL RECTAL DAILY PRN
Status: DISCONTINUED | OUTPATIENT
Start: 2019-11-14 | End: 2019-11-16 | Stop reason: HOSPADM

## 2019-11-14 RX ORDER — LABETALOL HYDROCHLORIDE 5 MG/ML
10 INJECTION, SOLUTION INTRAVENOUS
Status: DISCONTINUED | OUTPATIENT
Start: 2019-11-14 | End: 2019-11-14

## 2019-11-14 RX ORDER — HYDRALAZINE HYDROCHLORIDE 20 MG/ML
20 INJECTION INTRAMUSCULAR; INTRAVENOUS ONCE
Status: COMPLETED | OUTPATIENT
Start: 2019-11-14 | End: 2019-11-14

## 2019-11-14 RX ORDER — ATORVASTATIN CALCIUM 80 MG/1
80 TABLET, FILM COATED ORAL NIGHTLY
Status: DISCONTINUED | OUTPATIENT
Start: 2019-11-14 | End: 2019-11-15

## 2019-11-14 RX ORDER — INSULIN GLARGINE 100 [IU]/ML
15 INJECTION, SOLUTION SUBCUTANEOUS EVERY MORNING
Status: DISCONTINUED | OUTPATIENT
Start: 2019-11-15 | End: 2019-11-16 | Stop reason: HOSPADM

## 2019-11-14 RX ORDER — SODIUM CHLORIDE 9 MG/ML
75 INJECTION, SOLUTION INTRAVENOUS CONTINUOUS
Status: DISCONTINUED | OUTPATIENT
Start: 2019-11-14 | End: 2019-11-15

## 2019-11-14 RX ORDER — NICOTINE POLACRILEX 4 MG
15 LOZENGE BUCCAL
Status: DISCONTINUED | OUTPATIENT
Start: 2019-11-14 | End: 2019-11-16 | Stop reason: HOSPADM

## 2019-11-14 RX ORDER — ASPIRIN 325 MG
325 TABLET ORAL DAILY
Status: DISCONTINUED | OUTPATIENT
Start: 2019-11-15 | End: 2019-11-15

## 2019-11-14 RX ORDER — ONDANSETRON 2 MG/ML
4 INJECTION INTRAMUSCULAR; INTRAVENOUS EVERY 6 HOURS PRN
Status: DISCONTINUED | OUTPATIENT
Start: 2019-11-14 | End: 2019-11-16 | Stop reason: HOSPADM

## 2019-11-14 RX ORDER — CLONAZEPAM 1 MG/1
1 TABLET ORAL NIGHTLY PRN
Status: ON HOLD | COMMUNITY
End: 2019-11-14

## 2019-11-14 RX ORDER — DEXTROSE MONOHYDRATE 25 G/50ML
25 INJECTION, SOLUTION INTRAVENOUS
Status: DISCONTINUED | OUTPATIENT
Start: 2019-11-14 | End: 2019-11-16 | Stop reason: HOSPADM

## 2019-11-14 RX ORDER — ACETAMINOPHEN 325 MG/1
650 TABLET ORAL EVERY 4 HOURS PRN
Status: DISCONTINUED | OUTPATIENT
Start: 2019-11-14 | End: 2019-11-16 | Stop reason: HOSPADM

## 2019-11-14 RX ADMIN — LABETALOL HYDROCHLORIDE 10 MG: 5 INJECTION, SOLUTION INTRAVENOUS at 21:56

## 2019-11-14 RX ADMIN — ASPIRIN 324 MG: 81 TABLET, CHEWABLE ORAL at 15:35

## 2019-11-14 RX ADMIN — HYDRALAZINE HYDROCHLORIDE 20 MG: 20 INJECTION INTRAMUSCULAR; INTRAVENOUS at 13:21

## 2019-11-14 NOTE — ED NOTES
Pt states despite bp decreasing, he is still having blurred vision and frontal headache. MD Ferrera notified, no new orders at this time       Johanna Jalloh RN  11/14/19 [id number]

## 2019-11-14 NOTE — ED PROVIDER NOTES
EMERGENCY DEPARTMENT ENCOUNTER    Room Number:  39/39  Date seen:  11/14/2019  Time seen: 12:26 PM  PCP: Mariya Lopez MD  Historian: patient      HPI:  Chief Complaint: dizziness  A complete HPI/ROS/PMH/PSH/SH/FH are unobtainable due to: nothing  Context: Bird Gallegos is a 74 y.o. male who presents to the ED c/o dizziness that began around one hour ago. The patient is not able to further characterize the dizziness. The patient also complains of associated numbness of the left forearm, left hand, and left side of the face, blurred vision of both eyes (left greater than right), a left frontal headache, mild shortness of breath, and generalized weakness. The patient denies associated chest pain or abdominal pain. The patient reports he was at his oral surgeon's office earlier today to have a tooth extracted at which time he was found to be hypertensive with a blood pressure of 200/120. Therefore, he was sent to the ER for further evaluation. The patient reports he has a hx of hypertension. The patient reports he is currently prescribed Carvedilol which he has been taking as directed. The patient denies hx of migraines or headaches. The patient reports he takes an 81 mg daily. He denies taking any other anticoagulants currently. There are no other complaints at this time. Pt's family at bedside.        PAST MEDICAL HISTORY  Active Ambulatory Problems     Diagnosis Date Noted   • STEF on auto CPAP 12/10/2017   • Hypersomnia due to another medical condition 12/10/2017   • Class 3 severe obesity due to excess calories with serious comorbidity and body mass index (BMI) of 45.0 to 49.9 in adult (CMS/[facility]) 12/10/2017   • Nonrheumatic aortic valve stenosis 03/18/2019   • Aortic valve stenosis 03/22/2019   • Coronary artery disease involving native coronary artery of native heart 03/22/2019     Resolved Ambulatory Problems     Diagnosis Date Noted   • No Resolved Ambulatory Problems     Past Medical History:    Diagnosis Date   • Anxiety and depression    • Aortic stenosis    • Arthritis    • Coronary artery disease    • Diabetes mellitus (CMS/HCC)    • Diabetic neuropathy (CMS/HCC)    • Diverticulosis    • COLVIN (dyspnea on exertion)    • GERD (gastroesophageal reflux disease)    • Heart murmur    • History of cellulitis    • Hypertension    • STEF (obstructive sleep apnea)    • STEF on auto CPAP          PAST SURGICAL HISTORY  Past Surgical History:   Procedure Laterality Date   • BACK SURGERY      X2   • CARDIAC CATHETERIZATION N/A 3/21/2019    Procedure: Coronary angiography;  Surgeon: Wendy Braxton MD;  Location: [facility] INVASIVE LOCATION;  Service: Cardiology   • CORONARY ARTERY BYPASS GRAFT WITH AORTIC VALVE REPAIR/REPLACEMENT N/A 3/26/2019    Procedure: INTRAOP QUIQUE; CORONARY ARTERY BYPASS X 1 UTILIZING ENDOSCOPICALLY HARVESTED RIGHT GREATER SAPHENOUS VEIN; AORTIC VALVE REPLACEMENT AND PRP.;  Surgeon: Alexx Gandara MD;  Location: [facility] OR;  Service: Cardiothoracic   • POSTERIOR CERVICAL FUSION     • TENDON REPAIR Left     KNEE   • WRIST SURGERY Bilateral          FAMILY HISTORY  Family History   Problem Relation Age of Onset   • Heart disease Mother    • Cancer Father    • Hypertension Brother    • Hypertension Brother    • Malig Hyperthermia Neg Hx          SOCIAL HISTORY  Social History     Socioeconomic History   • Marital status:      Spouse name: Not on file   • Number of children: Not on file   • Years of education: Not on file   • Highest education level: Not on file   Tobacco Use   • Smoking status: Former Smoker     Packs/day: 0.50     Types: Cigarettes     Last attempt to quit:      Years since quittin.8   • Smokeless tobacco: Never Used   • Tobacco comment: Smoked between the ages of 25 and 40.   Substance and Sexual Activity   • Alcohol use: No   • Drug use: No   • Sexual activity: Defer         ALLERGIES  Contrast dye        REVIEW OF SYSTEMS  Review of Systems    Constitutional: Negative for diaphoresis and fever.   HENT: Negative for congestion.    Eyes: Positive for visual disturbance (blurred vision of both eyes (left greater than right)).   Respiratory: Positive for shortness of breath (mild).    Cardiovascular: Negative for chest pain and palpitations.   Gastrointestinal: Negative for abdominal pain, blood in stool and vomiting.   Endocrine: Negative for polyuria.   Genitourinary: Negative for flank pain.   Musculoskeletal: Negative for joint swelling.   Skin: Negative for wound.   Neurological: Positive for dizziness, weakness (generalized), numbness (of the left forearm, left hand, and left side of the face) and headaches (left frontal headache). Negative for seizures.   Hematological: Negative for adenopathy.   Psychiatric/Behavioral: Negative for sleep disturbance.            PHYSICAL EXAM  ED Triage Vitals [11/14/19 1153]   Temp Heart Rate Resp BP SpO2   98.3 °F (36.8 °C) 89 16 (!) 189/116 99 %      Temp src Heart Rate Source Patient Position BP Location FiO2 (%)   Tympanic -- -- -- --         GENERAL: awake, alert, no acute distress  HENT: nares patent  EYES: no scleral icterus  CV: regular rhythm, normal rate, no murmur  RESPIRATORY: normal effort, CTAB  ABDOMEN: soft  MUSCULOSKELETAL: no deformity  NEURO: alert, moves all extremities, follows commands  Recent and remote memory functions are normal. The patient is attentive with normal concentration. Language is fluent. Speech is clear, non-dysarthric. Fund of knowledge is normal.   Symmetric smile with no facial droop.  Eyes close and shut strongly bilaterally.  Symmetric eyebrow raise bilaterally.  EOMI, PERRL  CN II-XII grossly normal otherwise.  5/5 strength to bilateral upper and lower extremities.  No pronator drift.  Intact FNF.  Slightly diminished sensation to light touch to the left side of the face and to the arm from the forearm and down.  Steady gait but pt reports feeling slightly dizzy when  ambulating  SKIN: warm, dry    Vital signs and nursing notes reviewed.          LAB RESULTS  Recent Results (from the past 24 hour(s))   Comprehensive Metabolic Panel    Collection Time: 11/14/19 12:08 PM   Result Value Ref Range    Glucose 158 (H) 65 - 99 mg/dL    BUN 16 8 - 23 mg/dL    Creatinine 0.95 0.76 - 1.27 mg/dL    Sodium 140 136 - 145 mmol/L    Potassium 4.3 3.5 - 5.2 mmol/L    Chloride 101 98 - 107 mmol/L    CO2 25.2 22.0 - 29.0 mmol/L    Calcium 9.3 8.6 - 10.5 mg/dL    Total Protein 7.7 6.0 - 8.5 g/dL    Albumin 4.30 3.50 - 5.20 g/dL    ALT (SGPT) 16 1 - 41 U/L    AST (SGOT) 19 1 - 40 U/L    Alkaline Phosphatase 78 39 - 117 U/L    Total Bilirubin 0.6 0.2 - 1.2 mg/dL    eGFR Non African Amer 77 >60 mL/min/1.73    Globulin 3.4 gm/dL    A/G Ratio 1.3 g/dL    BUN/Creatinine Ratio 16.8 7.0 - 25.0    Anion Gap 13.8 5.0 - 15.0 mmol/L   CBC Auto Differential    Collection Time: 11/14/19 12:08 PM   Result Value Ref Range    WBC 7.41 3.40 - 10.80 10*3/mm3    RBC 5.31 4.14 - 5.80 10*6/mm3    Hemoglobin 15.6 13.0 - 17.7 g/dL    Hematocrit 45.6 37.5 - 51.0 %    MCV 85.9 79.0 - 97.0 fL    MCH 29.4 26.6 - 33.0 pg    MCHC 34.2 31.5 - 35.7 g/dL    RDW 14.5 12.3 - 15.4 %    RDW-SD 44.9 37.0 - 54.0 fl    MPV 10.6 6.0 - 12.0 fL    Platelets 177 140 - 450 10*3/mm3    Neutrophil % 66.2 42.7 - 76.0 %    Lymphocyte % 19.8 19.6 - 45.3 %    Monocyte % 7.8 5.0 - 12.0 %    Eosinophil % 5.0 0.3 - 6.2 %    Basophil % 0.7 0.0 - 1.5 %    Immature Grans % 0.5 0.0 - 0.5 %    Neutrophils, Absolute 4.90 1.70 - 7.00 10*3/mm3    Lymphocytes, Absolute 1.47 0.70 - 3.10 10*3/mm3    Monocytes, Absolute 0.58 0.10 - 0.90 10*3/mm3    Eosinophils, Absolute 0.37 0.00 - 0.40 10*3/mm3    Basophils, Absolute 0.05 0.00 - 0.20 10*3/mm3    Immature Grans, Absolute 0.04 0.00 - 0.05 10*3/mm3    nRBC 0.0 0.0 - 0.2 /100 WBC   POC Glucose Once    Collection Time: 11/14/19  3:33 PM   Result Value Ref Range    Glucose 119 70 - 130 mg/dL       Ordered the above  labs and reviewed the results.        RADIOLOGY  Ct Head Without Contrast    Result Date: 11/14/2019  EMERGENCY NONCONTRAST HEAD CT 11/14/2019  CLINICAL HISTORY: Dizziness, left-sided paresthesias, headache and hypertension.  TECHNIQUE: Spiral CT images were obtained from the base of the skull to the vertex without intravenous contrast. Images were reformatted and submitted in 3 mm thick axial CT sections with brain algorithm and 2 mm thick sagittal and coronal reconstructions were performed and submitted in brain algorithm.  No prior studies from  for comparison.  FINDINGS: There is some mild low density and periventricular white matter consistent with mild small vessel disease. The remainder of the brain parenchyma is normal in attenuation. The ventricles are normal in size. I see no focal mass effect and no midline shift and no extra-axial fluid collections are identified and there is no evidence of acute intracranial hemorrhage. The paranasal sinuses and mastoid air cells and middle ear cavities are clear; the calvarium and skull base are normal in appearance.  Calcified plaques are present in the intracranial segment of the distal left vertebral artery, cavernous segments of internal carotid arteries bilaterally.      1. There is mild small vessel disease in the cerebral white matter and there are calcified atherosclerotic plaques in the intracranial segments of the distal left vertebral artery and cavernous segments of the internal carotid arteries, bilaterally. The remainder of the head CT is within normal limits. The etiology of the left arm and facial paresthesias and dizziness are not established on this exam.  If symptoms persist and one remains at all concerned about acute stroke, I recommended MRI of the head for more complete assessment.  The result and recommendations were discussed with Dr. Ferrera from the emergency room by telephone 11/14/2019 at 2:30 PM.  Radiation dose  reduction techniques were utilized, including automated exposure control and exposure modulation based on body size.  This report was finalized on 11/14/2019 3:49 PM by Dr. Vivek Liao M.D.        Ordered the above noted radiological studies. Reviewed by me in PACS.          PROCEDURES  Procedures        EKG:           EKG time: 1208  Rhythm/Rate: SR, 74  P waves and VT: nml; nml  QRS, axis: borderline LAD   ST and T waves: no acute ischemic changes     Interpreted Contemporaneously by me, independently viewed  Similar compared to prior from 03/28/2019.          MEDICATIONS GIVEN IN ER  Medications   sodium chloride 0.9 % flush 10 mL (not administered)   sodium chloride 0.9 % flush 10 mL (not administered)   hydrALAZINE (APRESOLINE) injection 20 mg (20 mg Intravenous Given 11/14/19 1321)   aspirin chewable tablet 324 mg (324 mg Oral Given 11/14/19 1535)                     MEDICAL DECISION MAKING and ED Course  ED Course as of Nov 14 1703   Thu Nov 14, 2019   1447 Patient not TPA candidate due to mild symptoms (paresthesias left side of face and left forearm).  NIHSS of 1.    [JR]      ED Course User Index  [JR] Herman Ferrera MD      1153 The patient arrived to the ED with a blood pressure of 189/116.    1247 The patient's blood pressure is currently 195/106.    1257 Ordered Hydralazine to treat the patient's elevated blood pressure. Ordered CT Head, EKG, and blood work for further evaluation.    1330 The patient's blood pressure is currently 146/75.    1423 Rechecked the patient who currently complains of blurry vision in both eyes (left greater than right), a left frontal headache, and generalized weakness. Patient is stable. BP- 140/70 HR- 71 Temp- 98.3 °F (36.8 °C) (Tympanic) O2 sat- 98%. Informed the patient of his unremarkable CT Head. Since the patient is still symptomatic despite his blood pressure being lowered in the ER, discussed the plan for admission for further evaluation and management. Pt  understands and agrees with the plan, all questions answered.    1429 Received a call from Dr. Liao, radiologist, who stated that the CT Head shows nothing acute.    1447 Ordered 324 mg ASA. Placed call to [facility] for admission and Neurology for consult.    1456 Received a call from Dr. Robert Hargrove (Stroke Neurology) and discussed pt's case. Dr. Hargrove agreed with plan to consult.    1524 Received a call from Dr. Carmen ([facility]) and discussed pt's case. Dr. Carmen agreed with plan to admit the patient to tele obs for further evaluation and management.        MDM  Number of Diagnoses or Management Options  Hypertensive urgency:   Paresthesias:   Vision disturbance:      Amount and/or Complexity of Data Reviewed  Clinical lab tests: ordered and reviewed (Unremarkable)  Tests in the radiology section of CPT®: reviewed and ordered (Ct Head Without Contrast    Result Date: 11/14/2019  Narrative: EMERGENCY NONCONTRAST HEAD CT 11/14/2019  CLINICAL HISTORY: Dizziness, left-sided paresthesias, headache and hypertension.  TECHNIQUE: Spiral CT images were obtained from the base of the skull to the vertex without intravenous contrast. Images were reformatted and submitted in 3 mm thick axial CT sections with brain algorithm and 2 mm thick sagittal and coronal reconstructions were performed and submitted in brain algorithm.  No prior studies from [facility] for comparison.  FINDINGS: There is some mild low density and periventricular white matter consistent with mild small vessel disease. The remainder of the brain parenchyma is normal in attenuation. The ventricles are normal in size. I see no focal mass effect and no midline shift and no extra-axial fluid collections are identified and there is no evidence of acute intracranial hemorrhage. The paranasal sinuses and mastoid air cells and middle ear cavities are clear; the calvarium and skull base are normal in appearance.  Calcified plaques are present in the intracranial  segment of the distal left vertebral artery, cavernous segments of internal carotid arteries bilaterally.      Impression: There is mild small vessel disease in the cerebral white matter and calcified atherosclerotic plaques in the intracranial segments of the distal left vertebral artery and cavernous segments of the internal carotid arteries bilaterally. The remainder of the head CT is within normal limits. The etiology of the left arm and facial paresthesias and dizziness are not established on this exam.  If symptoms persist and one remains at all concerned about acute stroke, I recommended MRI of the head for more complete assessment.  The result and recommendations were discussed with Dr. Ferrera from the emergency room by telephone 11/14/2019 at 2:30 PM.  Radiation dose reduction techniques were utilized, including automated exposure control and exposure modulation based on body size.)  Tests in the medicine section of CPT®: reviewed and ordered (See procedure notes for EKG interpretation.)  Discussion of test results with the performing providers: yes (Dr. Liao (radiologist))  Decide to obtain previous medical records or to obtain history from someone other than the patient: yes  Review and summarize past medical records: yes (The patient called his PMD's office earlier today to inform them of his hypertension. He was advised to come to the ER for further evaluation.)  Discuss the patient with other providers: yes (Dr. Robert Hargrove (Stroke Neurology) and Dr. Carmen ([facility]))  Independent visualization of images, tracings, or specimens: yes    Patient Progress  Patient progress: stable       74-year-old male presents for evaluation of hypertension as well as paresthesias in the left side of his face, left forearm and mild vision disturbance.  He has no visual field deficit reports mild blurry vision in the left eye.  His blood pressure was quite elevated here.  He received 20 mg IV hydralazine with reduction of his  blood pressure to the 150 systolic.  CT brain is negative acute.  He will be admitted for further evaluation and likely brain MRI to evaluate for possible lacunar infarct.  He is not a TPA candidate due to mild symptoms.      DIAGNOSIS  Final diagnoses:   Paresthesias   Hypertensive urgency   Vision disturbance         DISPOSITION  ADMISSION TO TELE OBS    Discussed treatment plan and reason for admission with pt/family and admitting physician.  Pt/family voiced understanding of the plan for admission for further testing/treatment as needed.          Latest Documented Vital Signs:  As of 5:03 PM  BP- 158/94 HR- 77 Temp- 98.3 °F (36.8 °C) (Tympanic) O2 sat- 95%        --  Documentation assistance provided by diana Burgos for Dr. SIMÓN Ferrera MD.  Information recorded by the scribe was done at my direction and has been verified and validated by me.      Please note that portions of this were completed with a voice recognition program.     Teressa Burgos  11/14/19 1523       Herman Ferrera MD  11/14/19 [id number]

## 2019-11-14 NOTE — ED NOTES
"Pt states \"I was suppose to have a tooth pulled today and they took my BP and it was 200/120 so they sent me here.\" Denies CP. Pt reports left hand numbness that began 20 min ago. Pt had equal strong .     Swallows, Viviane Quiroz RN  11/14/19 [id number]    "

## 2019-11-15 ENCOUNTER — APPOINTMENT (OUTPATIENT)
Dept: MRI IMAGING | Facility: HOSPITAL | Age: 74
End: 2019-11-15

## 2019-11-15 ENCOUNTER — APPOINTMENT (OUTPATIENT)
Dept: CARDIOLOGY | Facility: HOSPITAL | Age: 74
End: 2019-11-15

## 2019-11-15 LAB
ALBUMIN SERPL-MCNC: 3.6 G/DL (ref 3.5–5.2)
ALBUMIN/GLOB SERPL: 1.2 G/DL
ALP SERPL-CCNC: 59 U/L (ref 39–117)
ALT SERPL W P-5'-P-CCNC: 16 U/L (ref 1–41)
ANION GAP SERPL CALCULATED.3IONS-SCNC: 11.4 MMOL/L (ref 5–15)
AORTIC DIMENSIONLESS INDEX: 0.5 (DI)
AST SERPL-CCNC: 17 U/L (ref 1–40)
BH CV ECHO MEAS - AO MAX PG: 21 MMHG
BH CV ECHO MEAS - AO MEAN PG (FULL): 9 MMHG
BH CV ECHO MEAS - AO MEAN PG: 12 MMHG
BH CV ECHO MEAS - AO ROOT AREA (BSA CORRECTED): 1.1
BH CV ECHO MEAS - AO ROOT AREA: 5.3 CM^2
BH CV ECHO MEAS - AO ROOT DIAM: 2.6 CM
BH CV ECHO MEAS - AO V2 MAX: 231 CM/SEC
BH CV ECHO MEAS - AO V2 MEAN: 155 CM/SEC
BH CV ECHO MEAS - AO V2 VTI: 54.3 CM
BH CV ECHO MEAS - AVA(I,A): 1.4 CM^2
BH CV ECHO MEAS - AVA(I,D): 1.4 CM^2
BH CV ECHO MEAS - BSA(HAYCOCK): 2.5 M^2
BH CV ECHO MEAS - BSA: 2.4 M^2
BH CV ECHO MEAS - BZI_BMI: 42.4 KILOGRAMS/M^2
BH CV ECHO MEAS - BZI_METRIC_HEIGHT: 172.7 CM
BH CV ECHO MEAS - BZI_METRIC_WEIGHT: 126.6 KG
BH CV ECHO MEAS - EDV(CUBED): 68.9 ML
BH CV ECHO MEAS - EDV(MOD-SP2): 98 ML
BH CV ECHO MEAS - EDV(MOD-SP4): 158 ML
BH CV ECHO MEAS - EDV(TEICH): 74.2 ML
BH CV ECHO MEAS - EF(CUBED): 64.6 %
BH CV ECHO MEAS - EF(MOD-BP): 70 %
BH CV ECHO MEAS - EF(MOD-SP2): 67.3 %
BH CV ECHO MEAS - EF(MOD-SP4): 73.4 %
BH CV ECHO MEAS - EF(TEICH): 56.6 %
BH CV ECHO MEAS - ESV(CUBED): 24.4 ML
BH CV ECHO MEAS - ESV(MOD-SP2): 32 ML
BH CV ECHO MEAS - ESV(MOD-SP4): 42 ML
BH CV ECHO MEAS - ESV(TEICH): 32.2 ML
BH CV ECHO MEAS - FS: 29.3 %
BH CV ECHO MEAS - IVS/LVPW: 0.86
BH CV ECHO MEAS - IVSD: 1.2 CM
BH CV ECHO MEAS - LAT PEAK E' VEL: 12.4 CM/SEC
BH CV ECHO MEAS - LV DIASTOLIC VOL/BSA (35-75): 67.1 ML/M^2
BH CV ECHO MEAS - LV MASS(C)D: 193.5 GRAMS
BH CV ECHO MEAS - LV MASS(C)DI: 82.2 GRAMS/M^2
BH CV ECHO MEAS - LV MEAN PG: 3 MMHG
BH CV ECHO MEAS - LV SYSTOLIC VOL/BSA (12-30): 17.8 ML/M^2
BH CV ECHO MEAS - LV V1 MAX: 108 CM/SEC
BH CV ECHO MEAS - LV V1 MEAN: 78.8 CM/SEC
BH CV ECHO MEAS - LV V1 VTI: 24.8 CM
BH CV ECHO MEAS - LVIDD: 4.1 CM
BH CV ECHO MEAS - LVIDS: 2.9 CM
BH CV ECHO MEAS - LVLD AP2: 7.5 CM
BH CV ECHO MEAS - LVLD AP4: 8.7 CM
BH CV ECHO MEAS - LVLS AP2: 6.1 CM
BH CV ECHO MEAS - LVLS AP4: 7.8 CM
BH CV ECHO MEAS - LVOT AREA (M): 3.1 CM^2
BH CV ECHO MEAS - LVOT AREA: 3.1 CM^2
BH CV ECHO MEAS - LVOT DIAM: 2 CM
BH CV ECHO MEAS - LVPWD: 1.4 CM
BH CV ECHO MEAS - MED PEAK E' VEL: 7.4 CM/SEC
BH CV ECHO MEAS - MV A DUR: 0.24 SEC
BH CV ECHO MEAS - MV A MAX VEL: 81.4 CM/SEC
BH CV ECHO MEAS - MV DEC SLOPE: 156 CM/SEC^2
BH CV ECHO MEAS - MV DEC TIME: 0.19 SEC
BH CV ECHO MEAS - MV E MAX VEL: 53.8 CM/SEC
BH CV ECHO MEAS - MV E/A: 0.66
BH CV ECHO MEAS - MV MEAN PG: 1 MMHG
BH CV ECHO MEAS - MV P1/2T MAX VEL: 73.7 CM/SEC
BH CV ECHO MEAS - MV P1/2T: 138.4 MSEC
BH CV ECHO MEAS - MV V2 MEAN: 56 CM/SEC
BH CV ECHO MEAS - MV V2 VTI: 29.5 CM
BH CV ECHO MEAS - MVA P1/2T LCG: 3 CM^2
BH CV ECHO MEAS - MVA(P1/2T): 1.6 CM^2
BH CV ECHO MEAS - MVA(VTI): 2.6 CM^2
BH CV ECHO MEAS - PA ACC SLOPE: 2358 CM/SEC^2
BH CV ECHO MEAS - PA ACC TIME: 0.06 SEC
BH CV ECHO MEAS - PA MAX PG (FULL): 6.2 MMHG
BH CV ECHO MEAS - PA MAX PG: 8.9 MMHG
BH CV ECHO MEAS - PA PR(ACCEL): 52 MMHG
BH CV ECHO MEAS - PA V2 MAX: 149 CM/SEC
BH CV ECHO MEAS - PULM A REVS DUR: 0.2 SEC
BH CV ECHO MEAS - PULM A REVS VEL: 28.5 CM/SEC
BH CV ECHO MEAS - PULM DIAS VEL: 57.5 CM/SEC
BH CV ECHO MEAS - PULM S/D: 0.68
BH CV ECHO MEAS - PULM SYS VEL: 39.3 CM/SEC
BH CV ECHO MEAS - PVA(V,A): 1.5 CM^2
BH CV ECHO MEAS - PVA(V,D): 1.5 CM^2
BH CV ECHO MEAS - QP/QS: 0.69
BH CV ECHO MEAS - RAP SYSTOLE: 3 MMHG
BH CV ECHO MEAS - RV MAX PG: 2.6 MMHG
BH CV ECHO MEAS - RV MEAN PG: 1 MMHG
BH CV ECHO MEAS - RV V1 MAX: 81.2 CM/SEC
BH CV ECHO MEAS - RV V1 MEAN: 51.6 CM/SEC
BH CV ECHO MEAS - RV V1 VTI: 19 CM
BH CV ECHO MEAS - RVOT AREA: 2.8 CM^2
BH CV ECHO MEAS - RVOT DIAM: 1.9 CM
BH CV ECHO MEAS - RVSP: 22.7 MMHG
BH CV ECHO MEAS - SI(AO): 122.4 ML/M^2
BH CV ECHO MEAS - SI(CUBED): 18.9 ML/M^2
BH CV ECHO MEAS - SI(LVOT): 33.1 ML/M^2
BH CV ECHO MEAS - SI(MOD-SP2): 28 ML/M^2
BH CV ECHO MEAS - SI(MOD-SP4): 49.3 ML/M^2
BH CV ECHO MEAS - SI(TEICH): 17.8 ML/M^2
BH CV ECHO MEAS - SV(AO): 288.3 ML
BH CV ECHO MEAS - SV(CUBED): 44.5 ML
BH CV ECHO MEAS - SV(LVOT): 77.9 ML
BH CV ECHO MEAS - SV(MOD-SP2): 66 ML
BH CV ECHO MEAS - SV(MOD-SP4): 116 ML
BH CV ECHO MEAS - SV(RVOT): 53.9 ML
BH CV ECHO MEAS - SV(TEICH): 42 ML
BH CV ECHO MEAS - TAPSE (>1.6): 1.7 CM2
BH CV ECHO MEAS - TR MAX VEL: 222 CM/SEC
BH CV ECHO MEASUREMENTS AVERAGE E/E' RATIO: 5.43
BH CV XLRA - RV BASE: 3.1 CM
BH CV XLRA - RV MID: 2.9 CM
BH CV XLRA - TDI S': 106 CM/SEC
BH CV XLRA MEAS LEFT CCA RATIO VEL: 56.2 CM/SEC
BH CV XLRA MEAS LEFT DIST CCA EDV: 13 CM/SEC
BH CV XLRA MEAS LEFT DIST CCA PSV: 56.2 CM/SEC
BH CV XLRA MEAS LEFT DIST ICA EDV: -15.5 CM/SEC
BH CV XLRA MEAS LEFT DIST ICA PSV: -50.2 CM/SEC
BH CV XLRA MEAS LEFT ICA RATIO VEL: 118 CM/SEC
BH CV XLRA MEAS LEFT ICA/CCA RATIO: 2.1
BH CV XLRA MEAS LEFT MID ICA EDV: 24 CM/SEC
BH CV XLRA MEAS LEFT MID ICA PSV: 118 CM/SEC
BH CV XLRA MEAS LEFT PROX CCA EDV: 15.2 CM/SEC
BH CV XLRA MEAS LEFT PROX CCA PSV: 78.6 CM/SEC
BH CV XLRA MEAS LEFT PROX ECA EDV: -8.8 CM/SEC
BH CV XLRA MEAS LEFT PROX ECA PSV: -85.6 CM/SEC
BH CV XLRA MEAS LEFT PROX ICA EDV: -34 CM/SEC
BH CV XLRA MEAS LEFT PROX ICA PSV: -107 CM/SEC
BH CV XLRA MEAS LEFT PROX SCLA PSV: 110 CM/SEC
BH CV XLRA MEAS LEFT VERTEBRAL A EDV: 8.3 CM/SEC
BH CV XLRA MEAS LEFT VERTEBRAL A PSV: 25.7 CM/SEC
BH CV XLRA MEAS RIGHT CCA RATIO VEL: -63.6 CM/SEC
BH CV XLRA MEAS RIGHT DIST CCA EDV: -11.8 CM/SEC
BH CV XLRA MEAS RIGHT DIST CCA PSV: -63.6 CM/SEC
BH CV XLRA MEAS RIGHT DIST ICA EDV: -18.9 CM/SEC
BH CV XLRA MEAS RIGHT DIST ICA PSV: -53 CM/SEC
BH CV XLRA MEAS RIGHT ICA RATIO VEL: -97.9 CM/SEC
BH CV XLRA MEAS RIGHT ICA/CCA RATIO: 1.5
BH CV XLRA MEAS RIGHT MID ICA EDV: 17.6 CM/SEC
BH CV XLRA MEAS RIGHT MID ICA PSV: 82.7 CM/SEC
BH CV XLRA MEAS RIGHT PROX CCA EDV: 15.8 CM/SEC
BH CV XLRA MEAS RIGHT PROX CCA PSV: 89.1 CM/SEC
BH CV XLRA MEAS RIGHT PROX ECA EDV: -6.5 CM/SEC
BH CV XLRA MEAS RIGHT PROX ECA PSV: -83.3 CM/SEC
BH CV XLRA MEAS RIGHT PROX ICA EDV: -22.9 CM/SEC
BH CV XLRA MEAS RIGHT PROX ICA PSV: -97.9 CM/SEC
BH CV XLRA MEAS RIGHT PROX SCLA PSV: 94.4 CM/SEC
BH CV XLRA MEAS RIGHT VERTEBRAL A EDV: 7.5 CM/SEC
BH CV XLRA MEAS RIGHT VERTEBRAL A PSV: 41.3 CM/SEC
BILIRUB SERPL-MCNC: 0.8 MG/DL (ref 0.2–1.2)
BILIRUB UR QL STRIP: NEGATIVE
BUN BLD-MCNC: 14 MG/DL (ref 8–23)
BUN/CREAT SERPL: 14.9 (ref 7–25)
CALCIUM SPEC-SCNC: 8.8 MG/DL (ref 8.6–10.5)
CHLORIDE SERPL-SCNC: 103 MMOL/L (ref 98–107)
CHOLEST SERPL-MCNC: 130 MG/DL (ref 0–200)
CLARITY UR: CLEAR
CO2 SERPL-SCNC: 27.6 MMOL/L (ref 22–29)
COLOR UR: YELLOW
CREAT BLD-MCNC: 0.94 MG/DL (ref 0.76–1.27)
DEPRECATED RDW RBC AUTO: 46.4 FL (ref 37–54)
ERYTHROCYTE [DISTWIDTH] IN BLOOD BY AUTOMATED COUNT: 14.6 % (ref 12.3–15.4)
GFR SERPL CREATININE-BSD FRML MDRD: 78 ML/MIN/1.73
GLOBULIN UR ELPH-MCNC: 3 GM/DL
GLUCOSE BLD-MCNC: 143 MG/DL (ref 65–99)
GLUCOSE BLDC GLUCOMTR-MCNC: 104 MG/DL (ref 70–130)
GLUCOSE BLDC GLUCOMTR-MCNC: 110 MG/DL (ref 70–130)
GLUCOSE BLDC GLUCOMTR-MCNC: 133 MG/DL (ref 70–130)
GLUCOSE BLDC GLUCOMTR-MCNC: 171 MG/DL (ref 70–130)
GLUCOSE UR STRIP-MCNC: NEGATIVE MG/DL
HBA1C MFR BLD: 6.5 % (ref 4.8–5.6)
HCT VFR BLD AUTO: 44.3 % (ref 37.5–51)
HDLC SERPL-MCNC: 27 MG/DL (ref 40–60)
HGB BLD-MCNC: 14.3 G/DL (ref 13–17.7)
HGB UR QL STRIP.AUTO: NEGATIVE
KETONES UR QL STRIP: NEGATIVE
LDLC SERPL CALC-MCNC: 60 MG/DL (ref 0–100)
LDLC/HDLC SERPL: 2.22 {RATIO}
LEFT ARM BP: NORMAL MMHG
LEFT ATRIUM VOLUME INDEX: 48 ML/M2
LEUKOCYTE ESTERASE UR QL STRIP.AUTO: NEGATIVE
MAXIMAL PREDICTED HEART RATE: 144 BPM
MCH RBC QN AUTO: 27.7 PG (ref 26.6–33)
MCHC RBC AUTO-ENTMCNC: 32.3 G/DL (ref 31.5–35.7)
MCV RBC AUTO: 85.7 FL (ref 79–97)
NITRITE UR QL STRIP: NEGATIVE
NT-PROBNP SERPL-MCNC: 725.2 PG/ML (ref 5–1800)
PH UR STRIP.AUTO: 7.5 [PH] (ref 5–8)
PLATELET # BLD AUTO: 179 10*3/MM3 (ref 140–450)
PMV BLD AUTO: 10.6 FL (ref 6–12)
POTASSIUM BLD-SCNC: 3.7 MMOL/L (ref 3.5–5.2)
PROT SERPL-MCNC: 6.6 G/DL (ref 6–8.5)
PROT UR QL STRIP: NEGATIVE
RBC # BLD AUTO: 5.17 10*6/MM3 (ref 4.14–5.8)
RIGHT ARM BP: NORMAL MMHG
SODIUM BLD-SCNC: 142 MMOL/L (ref 136–145)
SP GR UR STRIP: 1.01 (ref 1–1.03)
STRESS TARGET HR: 122 BPM
TRIGL SERPL-MCNC: 215 MG/DL (ref 0–150)
TSH SERPL DL<=0.05 MIU/L-ACNC: 3.39 UIU/ML (ref 0.27–4.2)
UROBILINOGEN UR QL STRIP: NORMAL
VIT B12 BLD-MCNC: 249 PG/ML (ref 211–946)
VLDLC SERPL-MCNC: 43 MG/DL (ref 5–40)
WBC NRBC COR # BLD: 8.27 10*3/MM3 (ref 3.4–10.8)

## 2019-11-15 PROCEDURE — 93306 TTE W/DOPPLER COMPLETE: CPT | Performed by: INTERNAL MEDICINE

## 2019-11-15 PROCEDURE — 81003 URINALYSIS AUTO W/O SCOPE: CPT | Performed by: HOSPITALIST

## 2019-11-15 PROCEDURE — 97110 THERAPEUTIC EXERCISES: CPT

## 2019-11-15 PROCEDURE — 97162 PT EVAL MOD COMPLEX 30 MIN: CPT

## 2019-11-15 PROCEDURE — 93306 TTE W/DOPPLER COMPLETE: CPT

## 2019-11-15 PROCEDURE — 84443 ASSAY THYROID STIM HORMONE: CPT | Performed by: HOSPITALIST

## 2019-11-15 PROCEDURE — 99214 OFFICE O/P EST MOD 30 MIN: CPT | Performed by: INTERNAL MEDICINE

## 2019-11-15 PROCEDURE — 83036 HEMOGLOBIN GLYCOSYLATED A1C: CPT | Performed by: HOSPITALIST

## 2019-11-15 PROCEDURE — 83880 ASSAY OF NATRIURETIC PEPTIDE: CPT | Performed by: HOSPITALIST

## 2019-11-15 PROCEDURE — 82962 GLUCOSE BLOOD TEST: CPT

## 2019-11-15 PROCEDURE — 63710000001 INSULIN LISPRO (HUMAN) PER 5 UNITS: Performed by: HOSPITALIST

## 2019-11-15 PROCEDURE — 97535 SELF CARE MNGMENT TRAINING: CPT

## 2019-11-15 PROCEDURE — 80053 COMPREHEN METABOLIC PANEL: CPT | Performed by: HOSPITALIST

## 2019-11-15 PROCEDURE — 97165 OT EVAL LOW COMPLEX 30 MIN: CPT

## 2019-11-15 PROCEDURE — 25010000002 LORAZEPAM PER 2 MG: Performed by: INTERNAL MEDICINE

## 2019-11-15 PROCEDURE — 85027 COMPLETE CBC AUTOMATED: CPT | Performed by: HOSPITALIST

## 2019-11-15 PROCEDURE — 63710000001 INSULIN GLARGINE PER 5 UNITS: Performed by: HOSPITALIST

## 2019-11-15 PROCEDURE — 70551 MRI BRAIN STEM W/O DYE: CPT

## 2019-11-15 PROCEDURE — 93880 EXTRACRANIAL BILAT STUDY: CPT

## 2019-11-15 PROCEDURE — 25010000002 PERFLUTREN (DEFINITY) 8.476 MG IN SODIUM CHLORIDE (PF) 0.9 % 10 ML INJECTION: Performed by: PSYCHIATRY & NEUROLOGY

## 2019-11-15 PROCEDURE — 82607 VITAMIN B-12: CPT | Performed by: HOSPITALIST

## 2019-11-15 PROCEDURE — 99205 OFFICE O/P NEW HI 60 MIN: CPT | Performed by: PSYCHIATRY & NEUROLOGY

## 2019-11-15 PROCEDURE — 80061 LIPID PANEL: CPT | Performed by: HOSPITALIST

## 2019-11-15 RX ORDER — CLOPIDOGREL BISULFATE 75 MG/1
300 TABLET ORAL ONCE
Status: COMPLETED | OUTPATIENT
Start: 2019-11-15 | End: 2019-11-15

## 2019-11-15 RX ORDER — ASPIRIN 81 MG/1
81 TABLET, CHEWABLE ORAL DAILY
Status: DISCONTINUED | OUTPATIENT
Start: 2019-11-16 | End: 2019-11-16 | Stop reason: HOSPADM

## 2019-11-15 RX ORDER — LISINOPRIL 10 MG/1
10 TABLET ORAL
Status: DISCONTINUED | OUTPATIENT
Start: 2019-11-15 | End: 2019-11-16 | Stop reason: HOSPADM

## 2019-11-15 RX ORDER — LORAZEPAM 2 MG/ML
1 INJECTION INTRAMUSCULAR ONCE
Status: COMPLETED | OUTPATIENT
Start: 2019-11-15 | End: 2019-11-15

## 2019-11-15 RX ORDER — CLOPIDOGREL BISULFATE 75 MG/1
75 TABLET ORAL DAILY
Status: DISCONTINUED | OUTPATIENT
Start: 2019-11-16 | End: 2019-11-16 | Stop reason: HOSPADM

## 2019-11-15 RX ORDER — CLONAZEPAM 1 MG/1
2 TABLET ORAL NIGHTLY
Status: DISCONTINUED | OUTPATIENT
Start: 2019-11-15 | End: 2019-11-16 | Stop reason: HOSPADM

## 2019-11-15 RX ORDER — ATORVASTATIN CALCIUM 20 MG/1
40 TABLET, FILM COATED ORAL NIGHTLY
Status: DISCONTINUED | OUTPATIENT
Start: 2019-11-15 | End: 2019-11-16 | Stop reason: HOSPADM

## 2019-11-15 RX ADMIN — ACETAMINOPHEN 650 MG: 325 TABLET, FILM COATED ORAL at 02:13

## 2019-11-15 RX ADMIN — CLOPIDOGREL 300 MG: 75 TABLET, FILM COATED ORAL at 12:37

## 2019-11-15 RX ADMIN — ATORVASTATIN CALCIUM 40 MG: 20 TABLET, FILM COATED ORAL at 21:05

## 2019-11-15 RX ADMIN — INSULIN LISPRO 5 UNITS: 100 INJECTION, SOLUTION INTRAVENOUS; SUBCUTANEOUS at 12:38

## 2019-11-15 RX ADMIN — LORAZEPAM 1 MG: 2 INJECTION INTRAMUSCULAR; INTRAVENOUS at 07:35

## 2019-11-15 RX ADMIN — CARVEDILOL 25 MG: 25 TABLET, FILM COATED ORAL at 21:05

## 2019-11-15 RX ADMIN — POTASSIUM CHLORIDE 10 MEQ: 750 CAPSULE, EXTENDED RELEASE ORAL at 09:38

## 2019-11-15 RX ADMIN — GABAPENTIN 300 MG: 300 CAPSULE ORAL at 16:22

## 2019-11-15 RX ADMIN — LABETALOL HYDROCHLORIDE 10 MG: 5 INJECTION, SOLUTION INTRAVENOUS at 09:49

## 2019-11-15 RX ADMIN — GABAPENTIN 300 MG: 300 CAPSULE ORAL at 21:05

## 2019-11-15 RX ADMIN — INSULIN LISPRO 5 UNITS: 100 INJECTION, SOLUTION INTRAVENOUS; SUBCUTANEOUS at 09:39

## 2019-11-15 RX ADMIN — ASPIRIN 325 MG: 325 TABLET ORAL at 09:39

## 2019-11-15 RX ADMIN — CARVEDILOL 25 MG: 25 TABLET, FILM COATED ORAL at 12:36

## 2019-11-15 RX ADMIN — SERTRALINE 50 MG: 50 TABLET, FILM COATED ORAL at 09:38

## 2019-11-15 RX ADMIN — INSULIN LISPRO 5 UNITS: 100 INJECTION, SOLUTION INTRAVENOUS; SUBCUTANEOUS at 18:14

## 2019-11-15 RX ADMIN — INSULIN LISPRO 2 UNITS: 100 INJECTION, SOLUTION INTRAVENOUS; SUBCUTANEOUS at 12:38

## 2019-11-15 RX ADMIN — GABAPENTIN 300 MG: 300 CAPSULE ORAL at 09:39

## 2019-11-15 RX ADMIN — LISINOPRIL 10 MG: 10 TABLET ORAL at 18:14

## 2019-11-15 RX ADMIN — GABAPENTIN 300 MG: 300 CAPSULE ORAL at 02:21

## 2019-11-15 RX ADMIN — TAMSULOSIN HYDROCHLORIDE 0.4 MG: 0.4 CAPSULE ORAL at 21:07

## 2019-11-15 RX ADMIN — SODIUM CHLORIDE 75 ML/HR: 9 INJECTION, SOLUTION INTRAVENOUS at 01:56

## 2019-11-15 RX ADMIN — PERFLUTREN 2 ML: 6.52 INJECTION, SUSPENSION INTRAVENOUS at 16:15

## 2019-11-15 RX ADMIN — ATORVASTATIN CALCIUM 80 MG: 80 TABLET, FILM COATED ORAL at 02:12

## 2019-11-15 RX ADMIN — CARVEDILOL 25 MG: 25 TABLET, FILM COATED ORAL at 01:56

## 2019-11-15 RX ADMIN — FUROSEMIDE 40 MG: 40 TABLET ORAL at 09:39

## 2019-11-15 RX ADMIN — CLONAZEPAM 2 MG: 1 TABLET ORAL at 22:15

## 2019-11-15 RX ADMIN — INSULIN GLARGINE 15 UNITS: 100 INJECTION, SOLUTION SUBCUTANEOUS at 09:39

## 2019-11-15 NOTE — SIGNIFICANT NOTE
11/15/19 1520   Rehab Time/Intention   Evaluation Not Performed patient unavailable for evaluation  (Pt continues to be off the floor for testing. Of note, MRI negative for acute infarct. )   Rehab Treatment   Discipline speech language pathologist

## 2019-11-15 NOTE — THERAPY EVALUATION
Acute Care - Occupational Therapy Initial Evaluation  [facility]     Patient Name: Bird Gallegos  : 1945  MRN: [id number]  Today's Date: 11/15/2019  Onset of Illness/Injury or Date of Surgery: 19  Date of Referral to OT: 19  Referring Physician: Dr Carmen     Admit Date: 2019       ICD-10-CM ICD-9-CM   1. Paresthesias R20.2 782.0   2. Hypertensive urgency I16.0 401.9   3. Vision disturbance H53.9 368.9     Patient Active Problem List   Diagnosis   • STEF on auto CPAP   • Hypersomnia due to another medical condition   • Class 3 severe obesity due to excess calories with serious comorbidity and body mass index (BMI) of 45.0 to 49.9 in adult (CMS/[facility])   • Nonrheumatic aortic valve stenosis   • Aortic valve stenosis   • Coronary artery disease involving native coronary artery of native heart   • Paresthesias   • Morbid obesity with BMI of 40.0-44.9, adult (CMS/[facility])   • Diabetic neuropathy (CMS/[facility])   • Type 2 diabetes mellitus, without long-term current use of insulin (CMS/[facility])   • Hypertension     Past Medical History:   Diagnosis Date   • Anxiety and depression    • Aortic stenosis    • Arthritis    • Coronary artery disease    • Diabetes mellitus (CMS/[facility])    • Diabetic neuropathy (CMS/[facility])    • Diverticulosis    • COLVIN (dyspnea on exertion)    • GERD (gastroesophageal reflux disease)    • Heart murmur    • History of cellulitis     LEFT LEG   • Hypertension    • STEF (obstructive sleep apnea)    • STEF on auto CPAP      Past Surgical History:   Procedure Laterality Date   • BACK SURGERY      X2   • CARDIAC CATHETERIZATION N/A 3/21/2019    Procedure: Coronary angiography;  Surgeon: Wendy Braxton MD;  Location: [facility] INVASIVE LOCATION;  Service: Cardiology   • CORONARY ARTERY BYPASS GRAFT WITH AORTIC VALVE REPAIR/REPLACEMENT N/A 3/26/2019    Procedure: INTRAOP QUIQUE; CORONARY ARTERY BYPASS X 1 UTILIZING ENDOSCOPICALLY HARVESTED RIGHT GREATER SAPHENOUS VEIN; AORTIC VALVE REPLACEMENT AND PRP.;   Surgeon: Alexx Gandara MD;  Location: [facility] MAIN OR;  Service: Cardiothoracic   • POSTERIOR CERVICAL FUSION     • TENDON REPAIR Left     KNEE   • WRIST SURGERY Bilateral           OT ASSESSMENT FLOWSHEET (last 12 hours)      Occupational Therapy Evaluation     Row Name 11/15/19 1109                   OT Evaluation Time/Intention    Subjective Information  complains of;weakness LUE   -SK        Document Type  evaluation  -SK        Mode of Treatment  individual therapy;occupational therapy  -SK        Patient Effort  excellent  -SK        Comment  Pt lives alone and still drives, does have basement and needs to go downstairs for laundry   -SK           General Information    Patient Profile Reviewed?  yes  -SK        Onset of Illness/Injury or Date of Surgery  11/14/19  -SK        Referring Physician  Dr Carmen   -SK        Patient Observations  alert;cooperative;agree to therapy  -SK        Patient/Family Observations  Pt supine in bed no signs of distress noted   -SK        Prior Level of Function  independent:  -SK        Equipment Currently Used at Home  shower chair;grab bar  -SK        Barriers to Rehab  none identified  -SK           Cognitive Assessment/Intervention- PT/OT    Orientation Status (Cognition)  oriented x 4  -SK        Follows Commands (Cognition)  WFL  -SK           Bed Mobility Assessment/Treatment    Bed Mobility Assessment/Treatment  supine-sit;sit-supine  -SK        Supine-Sit [place] (Bed Mobility)  supervision  -SK        Sit-Supine [place] (Bed Mobility)  supervision  -SK           Functional Mobility    Functional Mobility- Ind. Level  supervision required;verbal cues required  -SK        Functional Mobility- Device  standard walker  -SK        Functional Mobility-Distance (Feet)  20  -SK        Functional Mobility- Comment  vcs for walker safety   -SK           Transfer Assessment/Treatment    Transfer Assessment/Treatment  sit-stand transfer;stand-sit transfer;toilet  transfer  -SK           Sit-Stand Transfer    Sit-Stand [place] (Transfers)  verbal cues;supervision  -SK        Assistive Device (Sit-Stand Transfers)  walker, standard  -SK           Stand-Sit Transfer    Stand-Sit [place] (Transfers)  supervision  -SK        Assistive Device (Stand-Sit Transfers)  walker, standard  -SK           Toilet Transfer    Type (Toilet Transfer)  sit-stand;stand-sit  -SK        [place] Level (Toilet Transfer)  stand by assist  -SK        Assistive Device (Toilet Transfer)  grab bars/safety frame;walker, standard  -SK           ADL Assessment/Intervention    BADL Assessment/Intervention  bathing;upper body dressing;lower body dressing;grooming;toileting  -SK           Lower Body Dressing Assessment/Training    Lower Body Dressing [place] Level  doff;don;socks;set up;verbal cues incresaed time   -SK        Lower Body Dressing Position  edge of bed sitting  -SK        Comment (Lower Body Dressing)  Pt perform bed mobility with supervion, supervision/SBA for toileting activity and transfer, set-up to don socks sitting EOB.     -SK           Grooming Assessment/Training    [place] Level (Grooming)  wash face, hands;set up;supervision  -SK        Grooming Position  sink side  -SK           General ROM    GENERAL ROM COMMENTS  E [facility]   -SK           MMT (Manual Muscle Testing)    General MMT Comments  [facility] 4-[facility]5..weaker than RUE   -SK           Motor Assessment/Interventions    Additional Documentation  Balance (Group);Balance Interventions (Group);Therapeutic Exercise (Group);Therapeutic Exercise Interventions (Group);Neuromuscular Re-education (Group)  -SK           Static Standing Balance    Level of [place] (Supported Standing, Static Balance)  supervision  -SK           Dynamic Standing Balance    Level of [place], Reaches Outside Midline (Standing, Dynamic Balance)  supervision  -SK        Assistive Device Utilized (Supported Standing,  Dynamic Balance)  walker, standard  -SK           Sensory Assessment/Intervention    Sensory General Assessment  other (see comments) LUE sensation intact however pt c/o numbness/tingling AAT   -SK        Additional Documentation  Vision Assessment/Intervention (Group)  -SK           Vision Assessment/Intervention    Visual Impairment/Limitations  blurry vision Pt stated R eye has had blurred vision for yrs left eye new   -SK           Positioning and Restraints    Pre-Treatment Position  in bed  -SK        Post Treatment Position  bed  -SK        In Bed  supine;call light within reach;encouraged to call for assist;exit alarm on  -SK           Pain Scale: Numbers Pre/Post-Treatment    Pain Scale: Numbers, Pretreatment  0/10 - no pain  -SK        Pain Scale: Numbers, Post-Treatment  0/10 - no pain  -SK           Clinical Impression (OT)    Date of Referral to OT  11/14/19  -SK        OT Diagnosis  Pt presents to OT with LUE numbness, blurried vision, decreased LUE strength as result from possible CVA   -SK        Functional Level at Time of Evaluation (OT Eval)  Pt drove self to the hospital.  Pt presents to OT with c/o LUE numbness, blurried vision, LUE weakness resulting in decreaed independence with ADLs.  Pt perform bed mobility with supervion, supervision for toileting activity and transfer, set-up to don socks sitting EOB.     -SK        Patient/Family Goals Statement (OT Eval)  return home and to OF   -SK        Criteria for Skilled Therapeutic Interventions Met (OT Eval)  yes  -SK        Rehab Potential (OT Eval)  good, to achieve stated therapy goals  -SK        Therapy Frequency (OT Eval)  5 times/wk  -SK        Care Plan Review (OT)  evaluation/treatment results reviewed;care plan/treatment goals reviewed;patient/other agree to care plan  -SK        Anticipated Discharge Disposition (OT)  home  -SK           Planned OT Interventions    Planned Therapy Interventions (OT Eval)  activity tolerance  training;BADL retraining;ROM/therapeutic exercise;strengthening exercise;transfer/mobility retraining;neuromuscular control/coordination retraining  -SK           OT Goals    Transfer Goal Selection (OT)  transfer, OT goal 1  -SK        Bathing Goal Selection (OT)  bathing, OT goal 1  -SK        Strength Goal Selection (OT)  strength, OT goal 1  -SK        Additional Documentation  Strength Goal Selection (OT) (Row)  -SK           Transfer Goal 1 (OT)    Activity/Assistive Device (Transfer Goal 1, OT)  transfers, all;sit-to-stand/stand-to-sit;bed-to-chair/chair-to-bed;toilet;shower chair;tub;walker, rolling  -SK        [place] Level/Cues Needed (Transfer Goal 1, OT)  conditional independence  -SK        Time Frame (Transfer Goal 1, OT)  1 week  -SK        Progress/Outcome (Transfer Goal 1, OT)  goal ongoing  -SK           Bathing Goal 1 (OT)    Activity/Assistive Device (Bathing Goal 1, OT)  bathing skills, all;upper body bathing;lower body bathing;shower chair  -SK        [place] Level/Cues Needed (Bathing Goal 1, OT)  conditional independence  -SK        Time Frame (Bathing Goal 1, OT)  1 week  -SK        Progress/Outcomes (Bathing Goal 1, OT)  goal ongoing  -SK           Strength Goal 1 (OT)    Strength Goal 1 (OT)  pt perform LUE AROM/theraband ex to increase strength to assist with ADL activity   -SK        Time Frame (Strength Goal 1, OT)  1 week  -SK        Progress/Outcome (Strength Goal 1, OT)  goal ongoing  -SK          User Key  (r) = Recorded By, (t) = Taken By, (c) = Cosigned By    Initials Name Effective Dates    SK Luma Villegas, OT 02/25/19 -          Occupational Therapy Education     Title: PT OT SLP Therapies (In Progress)     Topic: Occupational Therapy (Done)     Point: ADL training (Done)     Description: Instruct learner(s) on proper safety adaptation and remediation techniques during self care or transfers.   Instruct in proper use of assistive devices.    Learning Progress Summary            Patient Acceptance, E, VU by SK at 11/15/2019 11:16 AM    Comment:  educate pt on OT, DME, safety with walker, HEP                   Point: Home exercise program (Done)     Description: Instruct learner(s) on appropriate technique for monitoring, assisting and/or progressing therapeutic exercises/activities.    Learning Progress Summary           Patient Acceptance, E, VU by SK at 11/15/2019 11:16 AM    Comment:  educate pt on OT, DME, safety with walker, HEP                   Point: Precautions (Done)     Description: Instruct learner(s) on prescribed precautions during self-care and functional transfers.    Learning Progress Summary           Patient Acceptance, E, VU by SK at 11/15/2019 11:16 AM    Comment:  educate pt on OT, DME, safety with walker, HEP                   Point: Body mechanics (Done)     Description: Instruct learner(s) on proper positioning and spine alignment during self-care, functional mobility activities and/or exercises.    Learning Progress Summary           Patient Acceptance, E, VU by SK at 11/15/2019 11:16 AM    Comment:  educate pt on OT, DME, safety with walker, HEP                               User Key     Initials Effective Dates Name Provider Type Discipline    SK 02/25/19 -  Luma Villegas, NIKOLAS Occupational Therapist OT                  OT Recommendation and Plan  Outcome Summary/Treatment Plan (OT)  Anticipated Discharge Disposition (OT): home, home with assist  Planned Therapy Interventions (OT Eval): activity tolerance training, BADL retraining, ROM/therapeutic exercise, strengthening exercise, transfer/mobility retraining, neuromuscular control/coordination retraining  Therapy Frequency (OT Eval): 5 times/wk  Plan of Care Review  Plan of Care Reviewed With: patient  Plan of Care Reviewed With: patient  Outcome Summary: Pt 73 yo admitted to [facility] after increased BP, LUE parathesia and increased blurred vision.  Pt drove self to the hospital.  Pt presents to OT with c/o  LUE numbness, blurried vision, LUE weakness resulting in decreaed independence with ADLs.  Pt perform bed mobility with supervion, supervision for toileting activity and transfer, set-up to don socks sitting EOB.   Pt will benefit from skilled OT to focus on LUE strength, functional mobility.  Anticipate pt to return home with assist as needed.      Outcome Measures     Row Name 11/15/19 1100             How much help from another is currently needed...    Putting on and taking off regular lower body clothing?  3  -SK      Bathing (including washing, rinsing, and drying)  3  -SK      Toileting (which includes using toilet bed pan or urinal)  3  -SK      Putting on and taking off regular upper body clothing  3  -SK      Taking care of personal grooming (such as brushing teeth)  4  -SK      Eating meals  4  -SK      AM-PAC 6 Clicks Score (OT)  20  -SK         Modified [place] Scale    Modified Alisa Scale  2 - Slight disability.  Unable to carry out all previous activities but able to look after own affairs without assistance.  -SK         Functional Assessment    Outcome Measure Options  AM-PAC 6 Clicks Daily Activity (OT)  -SK        User Key  (r) = Recorded By, (t) = Taken By, (c) = Cosigned By    Initials Name Provider Type    Luma Brito OT Occupational Therapist          Time Calculation:   Time Calculation- OT     Row Name 11/15/19 1118             Time Calculation- OT    OT Start Time  1042  -SK      OT Stop Time  1110  -SK      OT Time Calculation (min)  28 min  -SK      Total Timed Code Minutes- OT  20 minute(s)  -SK      OT Received On  11/15/19  -SK      OT Goal Re-Cert Due Date  11/22/19  -SK        User Key  (r) = Recorded By, (t) = Taken By, (c) = Cosigned By    Initials Name Provider Type    Luma Brito OT Occupational Therapist        Therapy Charges for Today     Code Description Service Date Service Provider Modifiers Qty    [id number]  OT EVAL LOW COMPLEXITY 2 11/15/2019 Bakari  Luma OT GO 1    11915578614 HC OT SELF CARE/MGMT/TRAIN EA 15 MIN 11/15/2019 Luma Villegas OT GO 1               Luma Villegas OT  11/15/2019

## 2019-11-15 NOTE — PLAN OF CARE
Problem: Patient Care Overview  Goal: Plan of Care Review  Outcome: Outcome(s) achieved Date Met: 11/15/19   11/15/19 1018   Coping/Psychosocial   Plan of Care Reviewed With patient   Plan of Care Review   Progress improving   OTHER   Outcome Summary Pt doing well and agreeable to PT this am. He was admitted yesterday with LUE parathesia and high BP. PTA, pt was independent with mobility and ADL's. He lives alone and is still driving. Pt currently has no reports of pain or weakness. Pt states he still has some altered sensation in his hands, but it seems to be improving. Pt currently able to ambulate over 400 ft with R wx and SBA. No LOB or unsteadiness noted. Skilled PT not indiated at this time, as pt is close to baseline with mobility. Pt may ambulate with family or nsg. Will sign off.

## 2019-11-15 NOTE — PROGRESS NOTES
"DAILY PROGRESS NOTE  [facility]    Patient Identification:  Name: Bird Gallegos  Age: 74 y.o.  Sex: male  :  1945  MRN: [id number]         Primary Care Physician: Mariya Lopez MD    Subjective:  Interval History:Still weak parethesias and visual change.    Objective:    Scheduled Meds:  [START ON 2019] aspirin 81 mg Oral Daily   atorvastatin 40 mg Oral Nightly   carvedilol 25 mg Oral Q12H   clonazePAM 2 mg Oral Nightly   clopidogrel 300 mg Oral Once   [START ON 2019] clopidogrel 75 mg Oral Daily   furosemide 40 mg Oral Daily   gabapentin 300 mg Oral TID   insulin glargine 15 Units Subcutaneous QAM   insulin lispro 0-9 Units Subcutaneous 4x Daily With Meals & Nightly   insulin lispro 5 Units Subcutaneous TID With Meals   potassium chloride 10 mEq Oral Daily   sertraline 50 mg Oral Daily   tamsulosin 0.4 mg Oral Nightly     Continuous Infusions:  sodium chloride 75 mL/hr Last Rate: 75 mL/hr (11/15/19 0951)       Vital signs in last 24 hours:  Temp:  [97.7 °F (36.5 °C)-98.3 °F (36.8 °C)] 97.7 °F (36.5 °C)  Heart Rate:  [67-89] 67  Resp:  [16-18] 18  BP: (140-216)/() 160/103    Intake/Output:    Intake/Output Summary (Last 24 hours) at 11/15/2019 1121  Last data filed at 11/15/2019 0156  Gross per 24 hour   Intake 975 ml   Output --   Net 975 ml       Exam:  BP (!) 160/103 (BP Location: Right arm, Patient Position: Lying)   Pulse 67   Temp 97.7 °F (36.5 °C) (Oral)   Resp 18   Ht 172.7 cm (68\")   Wt 127 kg (279 lb)   SpO2 95%   BMI 42.42 kg/m²     General Appearance:    Alert, cooperative, no distress   Head:    Normocephalic, without obvious abnormality, atraumatic   Eyes:       Throat:   Lips, tongue, gums normal   Neck:   Supple, symmetrical, trachea midline, no JVD   Lungs:     Clear to auscultation bilaterally, respirations unlabored   Chest Wall:    No tenderness or deformity    Heart:    Regular rate and rhythm, S1 and S2 normal, no murmur,no  Rub or " gallop   Abdomen:     Soft, non-tender, bowel sounds active, no masses, no organomegaly    Extremities:   Extremities normal, atraumatic, no cyanosis or edema   Pulses:      Skin:   Skin is warm and dry,  no rashes or palpable lesions   Neurologic:   no focal deficits noted      Lab Results (last 72 hours)     Procedure Component Value Units Date/Time    POC Glucose Once [[id number]]  (Abnormal) Collected:  11/15/19 1109    Specimen:  Blood Updated:  11/15/19 1111     Glucose 171 mg/dL     POC Glucose Once [[id number]]  (Abnormal) Collected:  11/15/19 0735    Specimen:  Blood Updated:  11/15/19 0738     Glucose 133 mg/dL     Vitamin B12 [[id number]]  (Normal) Collected:  11/15/19 0525    Specimen:  Blood Updated:  11/15/19 0642     Vitamin B-12 249 pg/mL     TSH [[id number]]  (Normal) Collected:  11/15/19 0525    Specimen:  Blood Updated:  11/15/19 0639     TSH 3.390 uIU/mL     BNP [[id number]]  (Normal) Collected:  11/15/19 0525    Specimen:  Blood Updated:  11/15/19 0638     proBNP 725.2 pg/mL     Narrative:       Among patients with dyspnea, NT-proBNP is highly sensitive for the detection of acute congestive heart failure. In addition NT-proBNP of <300 pg/ml effectively rules out acute congestive heart failure with 99% negative predictive value.    Comprehensive Metabolic Panel [[id number]]  (Abnormal) Collected:  11/15/19 0525    Specimen:  Blood Updated:  11/15/19 0633     Glucose 143 mg/dL      BUN 14 mg/dL      Creatinine 0.94 mg/dL      Sodium 142 mmol/L      Potassium 3.7 mmol/L      Chloride 103 mmol/L      CO2 27.6 mmol/L      Calcium 8.8 mg/dL      Total Protein 6.6 g/dL      Albumin 3.60 g/dL      ALT (SGPT) 16 U/L      AST (SGOT) 17 U/L      Alkaline Phosphatase 59 U/L      Total Bilirubin 0.8 mg/dL      eGFR Non African Amer 78 mL/min/1.73      Globulin 3.0 gm/dL      A/G Ratio 1.2 g/dL      BUN/Creatinine Ratio 14.9     Anion Gap 11.4 mmol/L     Narrative:       GFR Normal >60  Chronic Kidney Disease  <60  Kidney Failure <15    Lipid Panel [[id number]]  (Abnormal) Collected:  11/15/19 0525    Specimen:  Blood Updated:  11/15/19 0633     Total Cholesterol 130 mg/dL      Triglycerides 215 mg/dL      HDL Cholesterol 27 mg/dL      LDL Cholesterol  60 mg/dL      VLDL Cholesterol 43 mg/dL      LDL/HDL Ratio 2.22    Narrative:       Cholesterol Reference Ranges  (U.S. Department of Health and Human Services ATP III Classifications)    Desirable          <200 mg/dL  Borderline High    200-239 mg/dL  High Risk          >240 mg/dL      Triglyceride Reference Ranges  (U.S. Department of Health and Human Services ATP III Classifications)    Normal           <150 mg/dL  Borderline High  150-199 mg/dL  High             200-499 mg/dL  Very High        >500 mg/dL    HDL Reference Ranges  (U.S. Department of Health and Human Services ATP III Classifcations)    Low     <40 mg/dl (major risk factor for CHD)  High    >60 mg/dl ('negative' risk factor for CHD)        LDL Reference Ranges  (U.S. Department of Health and Human Services ATP III Classifcations)    Optimal          <100 mg/dL  Near Optimal     100-129 mg/dL  Borderline High  130-159 mg/dL  High             160-189 mg/dL  Very High        >189 mg/dL    Hemoglobin A1c [[id number]]  (Abnormal) Collected:  11/15/19 0525    Specimen:  Blood Updated:  11/15/19 0554     Hemoglobin A1C 6.50 %     Narrative:       Hemoglobin A1C Ranges:    Increased Risk for Diabetes  5.7% to 6.4%  Diabetes                     >= 6.5%  Diabetic Goal                < 7.0%    CBC (No Diff) [[id number]]  (Normal) Collected:  11/15/19 0525    Specimen:  Blood Updated:  11/15/19 0549     WBC 8.27 10*3/mm3      RBC 5.17 10*6/mm3      Hemoglobin 14.3 g/dL      Hematocrit 44.3 %      MCV 85.7 fL      MCH 27.7 pg      MCHC 32.3 g/dL      RDW 14.6 %      RDW-SD 46.4 fl      MPV 10.6 fL      Platelets 179 10*3/mm3     Urinalysis With Culture If Indicated - Urine, Clean Catch [[id number]]  (Normal) Collected:   11/15/19 0214    Specimen:  Urine, Clean Catch Updated:  11/15/19 0240     Color, UA Yellow     Appearance, UA Clear     pH, UA 7.5     Specific Gravity, UA 1.014     Glucose, UA Negative     Ketones, UA Negative     Bilirubin, UA Negative     Blood, UA Negative     Protein, UA Negative     Leuk Esterase, UA Negative     Nitrite, UA Negative     Urobilinogen, UA 0.2 E.U./dL    Narrative:       Urine microscopic not indicated.    POC Glucose Once [[id number]]  (Normal) Collected:  11/14/19 1533    Specimen:  Blood Updated:  11/14/19 1535     Glucose 119 mg/dL     Comprehensive Metabolic Panel [[id number]]  (Abnormal) Collected:  11/14/19 1208    Specimen:  Blood Updated:  11/14/19 1242     Glucose 158 mg/dL      BUN 16 mg/dL      Creatinine 0.95 mg/dL      Sodium 140 mmol/L      Potassium 4.3 mmol/L      Chloride 101 mmol/L      CO2 25.2 mmol/L      Calcium 9.3 mg/dL      Total Protein 7.7 g/dL      Albumin 4.30 g/dL      ALT (SGPT) 16 U/L      AST (SGOT) 19 U/L      Alkaline Phosphatase 78 U/L      Total Bilirubin 0.6 mg/dL      eGFR Non African Amer 77 mL/min/1.73      Globulin 3.4 gm/dL      A/G Ratio 1.3 g/dL      BUN/Creatinine Ratio 16.8     Anion Gap 13.8 mmol/L     Narrative:       GFR Normal >60  Chronic Kidney Disease <60  Kidney Failure <15    CBC & Differential [[id number]] Collected:  11/14/19 1208    Specimen:  Blood Updated:  11/14/19 1224    Narrative:       The following orders were created for panel order CBC & Differential.  Procedure                               Abnormality         Status                     ---------                               -----------         ------                     CBC Auto Differential[[id number]]        Normal              Final result                 Please view results for these tests on the individual orders.    CBC Auto Differential [[id number]]  (Normal) Collected:  11/14/19 1208    Specimen:  Blood Updated:  11/14/19 1224     WBC 7.41 10*3/mm3      RBC 5.31  10*6/mm3      Hemoglobin 15.6 g/dL      Hematocrit 45.6 %      MCV 85.9 fL      MCH 29.4 pg      MCHC 34.2 g/dL      RDW 14.5 %      RDW-SD 44.9 fl      MPV 10.6 fL      Platelets 177 10*3/mm3      Neutrophil % 66.2 %      Lymphocyte % 19.8 %      Monocyte % 7.8 %      Eosinophil % 5.0 %      Basophil % 0.7 %      Immature Grans % 0.5 %      Neutrophils, Absolute 4.90 10*3/mm3      Lymphocytes, Absolute 1.47 10*3/mm3      Monocytes, Absolute 0.58 10*3/mm3      Eosinophils, Absolute 0.37 10*3/mm3      Basophils, Absolute 0.05 10*3/mm3      Immature Grans, Absolute 0.04 10*3/mm3      nRBC 0.0 /100 WBC         Data Review:  Results from last 7 days   Lab Units 11/15/19  0525 11/14/19  1208   SODIUM mmol/L 142 140   POTASSIUM mmol/L 3.7 4.3   CHLORIDE mmol/L 103 101   CO2 mmol/L 27.6 25.2   BUN mg/dL 14 16   CREATININE mg/dL 0.94 0.95   GLUCOSE mg/dL 143* 158*   CALCIUM mg/dL 8.8 9.3     Results from last 7 days   Lab Units 11/15/19  0525 11/14/19  1208   WBC 10*3/mm3 8.27 7.41   HEMOGLOBIN g/dL 14.3 15.6   HEMATOCRIT % 44.3 45.6   PLATELETS 10*3/mm3 179 177     Results from last 7 days   Lab Units 11/15/19  0525   TSH uIU/mL 3.390     Results from last 7 days   Lab Units 11/15/19  0525   HEMOGLOBIN A1C % 6.50*     No results found for: TROPONINT  Results from last 7 days   Lab Units 11/15/19  0525   CHOLESTEROL mg/dL 130   TRIGLYCERIDES mg/dL 215*   HDL CHOL mg/dL 27*   LDL CHOL mg/dL 60     Results from last 7 days   Lab Units 11/15/19  0525 11/14/19  1208   ALK PHOS U/L 59 78   BILIRUBIN mg/dL 0.8 0.6   ALT (SGPT) U/L 16 16   AST (SGOT) U/L 17 19     Results from last 7 days   Lab Units 11/15/19  0525   TSH uIU/mL 3.390     Results from last 7 days   Lab Units 11/15/19  0525   HEMOGLOBIN A1C % 6.50*     Glucose   Date/Time Value Ref Range Status   11/15/2019 1109 171 (H) 70 - 130 mg/dL Final   11/15/2019 0735 133 (H) 70 - 130 mg/dL Final   11/14/2019 1533 119 70 - 130 mg/dL Final           Past Medical History:    Diagnosis Date   • Anxiety and depression    • Aortic stenosis    • Arthritis    • Coronary artery disease    • Diabetes mellitus (CMS/[facility])    • Diabetic neuropathy (CMS/[facility])    • Diverticulosis    • COLVIN (dyspnea on exertion)    • GERD (gastroesophageal reflux disease)    • Heart murmur    • History of cellulitis     LEFT LEG   • Hypertension    • STEF (obstructive sleep apnea)    • STEF on auto CPAP        Assessment:  Active Hospital Problems    Diagnosis  POA   • **Paresthesias [R20.2]  Yes   • Morbid obesity with BMI of 40.0-44.9, adult (CMS/[facility]) [E66.01, Z68.41]  Not Applicable   • Diabetic neuropathy (CMS/[facility]) [E11.40]  Yes   • Type 2 diabetes mellitus, without long-term current use of insulin (CMS/[facility]) [E11.9]  Yes   • Hypertension [I10]  Yes   • Coronary artery disease involving native coronary artery of native heart [I25.10]  Yes   • Nonrheumatic aortic valve stenosis [I35.0]  Yes   • STEF on auto CPAP [G47.33, Z99.89]  Not Applicable      Resolved Hospital Problems   No resolved problems to display.       Plan:  Stroke work up. Neuro consult noted.    Braden Welch MD  11/15/2019  11:21 AM

## 2019-11-15 NOTE — H&P
Patient Name:  Bird Gallegos  YOB: 1945  MRN:  [id number]  Admit Date:  11/14/2019  Patient Care Team:  Mariya Lopez MD as PCP - General (Internal Medicine)  Rudy Palacios DPM as PCP - Claims Attributed  Wendy Braxton MD as Consulting Physician (Cardiology)  Jensen Mas MD as Consulting Physician (Pulmonary Disease)      Subjective   History Present Illness     Chief Complaint   Patient presents with   • Hypertension   • Numbness       Mr. Gallegos is a 74 y.o. former smoker with a history of aortic stenosis, CAD, diabetes, obesity and sleep apnea that presents to [facility] complaining of LUE paresthesia.  He was initially at [facility] dental school today for a tooth extraction but this procedure was canceled as they checked his blood pressure and was approximately 200/120.  He was told to go to the ED and decided to present here as this is where he has had previous cardiac surgery.  He was asymptomatic at this morning without chest pain, headache or paresthesias.  As the day has gone on he started having some numbness in his left arm.  He denies any facial asymmetry or slurring of his words.  At the time of my exam he is starting to feel some tingling sensation in both feet and both hands.  No ataxia.  He denies any chest pain, shortness of breath, nausea or vomiting.  No abdominal pain.  He reports that he does not add salt to his food but admits he possibly is not watching his sodium intake close enough.  He has chronic swelling in his lower extremities that he states was previously under control.  Review of office visit note from his cardiologist in July suggested he had 2+ edema at that time.  He reports compliance with his medications including his Coreg and Lasix.      History of Present Illness  Review of Systems   Constitutional: Negative.    Eyes: Negative.    Respiratory: Negative.  Negative for shortness of breath.    Cardiovascular: Positive for leg  swelling. Negative for chest pain.   Gastrointestinal: Negative.    Endocrine: Negative.    Genitourinary: Negative.    Musculoskeletal: Negative.    Skin: Negative.    Neurological: Positive for headaches.   Hematological: Negative.    Psychiatric/Behavioral: Negative.         Personal History     Past Medical History:   Diagnosis Date   • Anxiety and depression    • Aortic stenosis    • Arthritis    • Coronary artery disease    • Diabetes mellitus (CMS/HCC)    • Diabetic neuropathy (CMS/HCC)    • Diverticulosis    • COLVIN (dyspnea on exertion)    • GERD (gastroesophageal reflux disease)    • Heart murmur    • History of cellulitis     LEFT LEG   • Hypertension    • STEF (obstructive sleep apnea)    • STEF on auto CPAP      Past Surgical History:   Procedure Laterality Date   • BACK SURGERY      X2   • CARDIAC CATHETERIZATION N/A 3/21/2019    Procedure: Coronary angiography;  Surgeon: Wendy Braxton MD;  Location: [facility] INVASIVE LOCATION;  Service: Cardiology   • CORONARY ARTERY BYPASS GRAFT WITH AORTIC VALVE REPAIR/REPLACEMENT N/A 3/26/2019    Procedure: INTRAOP QUIQUE; CORONARY ARTERY BYPASS X 1 UTILIZING ENDOSCOPICALLY HARVESTED RIGHT GREATER SAPHENOUS VEIN; AORTIC VALVE REPLACEMENT AND PRP.;  Surgeon: Alexx Gandara MD;  Location: [facility] OR;  Service: Cardiothoracic   • POSTERIOR CERVICAL FUSION     • TENDON REPAIR Left     KNEE   • WRIST SURGERY Bilateral      Family History   Problem Relation Age of Onset   • Heart disease Mother    • Cancer Father    • Hypertension Brother    • Hypertension Brother    • Malig Hyperthermia Neg Hx      Social History     Tobacco Use   • Smoking status: Former Smoker     Packs/day: 0.50     Types: Cigarettes     Last attempt to quit:      Years since quittin.8   • Smokeless tobacco: Never Used   • Tobacco comment: Smoked between the ages of 25 and 40.   Substance Use Topics   • Alcohol use: No   • Drug use: No     No current facility-administered medications on  file prior to encounter.      Current Outpatient Medications on File Prior to Encounter   Medication Sig Dispense Refill   • clonazePAM (KlonoPIN) 1 MG disintegrating tablet Take 2 mg by mouth Every Night.     • [DISCONTINUED] clonazePAM (KLONOPIN) 1 MG tablet Take 1 mg by mouth At Night As Needed for Seizures.     • ascorbic acid (VITAMIN C) 1000 MG tablet Take 1,000 mg by mouth Daily.     • atorvastatin (LIPITOR) 10 MG tablet Take 10 mg by mouth Daily.     • BABY ASPIRIN PO Take 81 mg by mouth Daily.     • carvedilol (COREG) 25 MG tablet Take 1 tablet by mouth Every 12 (Twelve) Hours. 60 tablet 2   • furosemide (LASIX) 40 MG tablet Take 40 mg by mouth Daily.     • gabapentin (NEURONTIN) 300 MG capsule Take 300 mg by mouth 3 (Three) Times a Day.     • glimepiride (AMARYL) 2 MG tablet Take 2 mg by mouth Every Morning Before Breakfast.     • loratadine (CLARITIN) 10 MG tablet Take 10 mg by mouth Daily.     • metFORMIN (GLUCOPHAGE) 1000 MG tablet Take 1,000 mg by mouth 2 (Two) Times a Day With Meals.     • potassium chloride (K-DUR,KLOR-CON) 10 MEQ CR tablet Take 10 mEq by mouth Daily.     • sertraline (ZOLOFT) 50 MG tablet Take 50 mg by mouth Daily.     • tamsulosin (FLOMAX) 0.4 MG capsule 24 hr capsule Take 1 capsule by mouth Every Night.     • [DISCONTINUED] ALPRAZolam (XANAX) 0.5 MG tablet Take 0.25 mg by mouth At Night As Needed.       Allergies   Allergen Reactions   • Contrast Dye Hives     Hives and rash reported       Objective    Objective     Vital Signs  Temp:  [98.1 °F (36.7 °C)-98.3 °F (36.8 °C)] 98.1 °F (36.7 °C)  Heart Rate:  [71-89] 82  Resp:  [16] 16  BP: (140-206)/() 183/88  SpO2:  [94 %-99 %] 94 %  on   ;   Device (Oxygen Therapy): room air  Body mass index is 42.42 kg/m².    Physical Exam   Constitutional: He is oriented to person, place, and time. He appears well-developed and well-nourished.   HENT:   Head: Normocephalic and atraumatic.   Eyes: Conjunctivae and EOM are normal. No scleral  icterus.   Neck: Normal range of motion. Neck supple. No JVD present.   Cardiovascular: Normal rate and regular rhythm.   Pulmonary/Chest: Effort normal and breath sounds normal. No respiratory distress.   Abdominal: Soft. Bowel sounds are normal. He exhibits no distension. There is no tenderness.   Morbidly obese   Musculoskeletal: He exhibits edema.   Neurological: He is alert and oriented to person, place, and time. No cranial nerve deficit.   Skin: Skin is warm and dry.   Psychiatric: He has a normal mood and affect. His behavior is normal.   Vitals reviewed.      Results Review:  I reviewed the patient's new clinical results.  I reviewed the patient's new imaging results and agree with the interpretation.  I reviewed the patient's other test results and agree with the interpretation  I personally viewed and interpreted the patient's EKG/Telemetry data  Discussed with ED provider.    Lab Results (last 24 hours)     Procedure Component Value Units Date/Time    CBC & Differential [[id number]] Collected:  11/14/19 1208    Specimen:  Blood Updated:  11/14/19 1224    Narrative:       The following orders were created for panel order CBC & Differential.  Procedure                               Abnormality         Status                     ---------                               -----------         ------                     CBC Auto Differential[[id number]]        Normal              Final result                 Please view results for these tests on the individual orders.    Comprehensive Metabolic Panel [[id number]]  (Abnormal) Collected:  11/14/19 1208    Specimen:  Blood Updated:  11/14/19 1242     Glucose 158 mg/dL      BUN 16 mg/dL      Creatinine 0.95 mg/dL      Sodium 140 mmol/L      Potassium 4.3 mmol/L      Chloride 101 mmol/L      CO2 25.2 mmol/L      Calcium 9.3 mg/dL      Total Protein 7.7 g/dL      Albumin 4.30 g/dL      ALT (SGPT) 16 U/L      AST (SGOT) 19 U/L      Alkaline Phosphatase 78 U/L      Total  Bilirubin 0.6 mg/dL      eGFR Non African Amer 77 mL/min/1.73      Globulin 3.4 gm/dL      A/G Ratio 1.3 g/dL      BUN/Creatinine Ratio 16.8     Anion Gap 13.8 mmol/L     Narrative:       GFR Normal >60  Chronic Kidney Disease <60  Kidney Failure <15    CBC Auto Differential [[id number]]  (Normal) Collected:  11/14/19 1208    Specimen:  Blood Updated:  11/14/19 1224     WBC 7.41 10*3/mm3      RBC 5.31 10*6/mm3      Hemoglobin 15.6 g/dL      Hematocrit 45.6 %      MCV 85.9 fL      MCH 29.4 pg      MCHC 34.2 g/dL      RDW 14.5 %      RDW-SD 44.9 fl      MPV 10.6 fL      Platelets 177 10*3/mm3      Neutrophil % 66.2 %      Lymphocyte % 19.8 %      Monocyte % 7.8 %      Eosinophil % 5.0 %      Basophil % 0.7 %      Immature Grans % 0.5 %      Neutrophils, Absolute 4.90 10*3/mm3      Lymphocytes, Absolute 1.47 10*3/mm3      Monocytes, Absolute 0.58 10*3/mm3      Eosinophils, Absolute 0.37 10*3/mm3      Basophils, Absolute 0.05 10*3/mm3      Immature Grans, Absolute 0.04 10*3/mm3      nRBC 0.0 /100 WBC     POC Glucose Once [[id number]]  (Normal) Collected:  11/14/19 1533    Specimen:  Blood Updated:  11/14/19 1535     Glucose 119 mg/dL           Imaging Results (Last 24 Hours)     Procedure Component Value Units Date/Time    CT Head Without Contrast [[id number]] Collected:  11/14/19 1505     Updated:  11/14/19 1552    Narrative:       EMERGENCY NONCONTRAST HEAD CT 11/14/2019     CLINICAL HISTORY: Dizziness, left-sided paresthesias, headache and  hypertension.     TECHNIQUE: Spiral CT images were obtained from the base of the skull to  the vertex without intravenous contrast. Images were reformatted and  submitted in 3 mm thick axial CT sections with brain algorithm and 2 mm  thick sagittal and coronal reconstructions were performed and submitted  in brain algorithm.     No prior studies from [facility] for comparison.     FINDINGS: There is some mild low density and periventricular white  matter consistent  with mild small vessel disease. The remainder of the  brain parenchyma is normal in attenuation. The ventricles are normal in  size. I see no focal mass effect and no midline shift and no extra-axial  fluid collections are identified and there is no evidence of acute  intracranial hemorrhage. The paranasal sinuses and mastoid air cells and  middle ear cavities are clear; the calvarium and skull base are normal  in appearance.  Calcified plaques are present in the intracranial  segment of the distal left vertebral artery, cavernous segments of  internal carotid arteries bilaterally.       Impression:       1. There is mild small vessel disease in the cerebral white matter and  there are calcified atherosclerotic plaques in the intracranial segments  of the distal left vertebral artery and cavernous segments of the  internal carotid arteries, bilaterally. The remainder of the head CT is  within normal limits. The etiology of the left arm and facial  paresthesias and dizziness are not established on this exam.  If  symptoms persist and one remains at all concerned about acute stroke, I  recommended MRI of the head for more complete assessment.     The result and recommendations were discussed with Dr. Ferrera from the  emergency room by telephone 11/14/2019 at 2:30 PM.      Radiation dose reduction techniques were utilized, including automated  exposure control and exposure modulation based on body size.     This report was finalized on 11/14/2019 3:49 PM by Dr. Vivek Liao M.D.             Results for orders placed in visit on 03/26/19   Emergent/Open-Heart Anesthesia QIUQUE    Narrative Intra-Operative QUIQUE was performed by anesthesia.  Please see Intra-Op and   Anesthesia notes for documentation.     ECG 12 Lead   Final Result   HEART RATE= 74  bpm   RR Interval= 812  ms   RI Interval= 186  ms   P Horizontal Axis= -9  deg   P Front Axis= 0  deg   QRSD Interval= 103  ms   QT Interval= 418  ms   QRS Axis= -25  deg   T  Wave Axis= 64  deg   - BORDERLINE ECG -   Sinus rhythm   Probable left atrial enlargement   Borderline left axis deviation   NO SIGNIFICANT CHANGE FROM PREVIOUS ECG   Electronically Signed By: Davidson Abbott ([facility]) 14-Nov-2019 16:21:43   Date and Time of Study: 2019-11-14 12:08:31           Assessment/Plan     Active Hospital Problems    Diagnosis POA   • **Paresthesias [R20.2] Yes   • Morbid obesity with BMI of 40.0-44.9, adult (CMS/[facility]) [E66.01, Z68.41] Not Applicable   • Diabetic neuropathy (CMS/[facility]) [E11.40] Yes   • Type 2 diabetes mellitus, without long-term current use of insulin (CMS/[facility]) [E11.9] Yes   • Coronary artery disease involving native coronary artery of native heart [I25.10] Yes     Added automatically from request for surgery [id number]     • Nonrheumatic aortic valve stenosis [I35.0] Yes     Added automatically from request for surgery [id number]     • STEF on auto CPAP [G47.33, Z99.89] Not Applicable       74 y.o. male admitted with LUE Paresthesias and elevated BP.    · Neurology was contacted by the ED who recommended admission for MRI brain.  Discussed with Dr. Hargrove.  Lacunar infarct suspected.  He was given hydralazine in the ED with some improvement in his blood pressure.  His blood pressure is elevated again on the floor and labetalol has been ordered.  · Will continue stroke protocol orders but hold on CTA and echocardiogram per my discussion with Dr. Hargrove.  · Aspirin and statin ordered.  · He sees [facility], Dr. Braxton.  Will ask her to see in consultation to assist with management of his blood pressure.  Will restart his Coreg this evening.  Will restart Lasix and KCl in the a.m.  · He has new onset frequency of urination and states urine is dark and foul-smelling.  Will check UA.  · Monitor blood glucose while holding Amaryl and metformin.  Will provide basal/bolus insulin in the hospital.  · SCDs for DVT prophylaxis.  · Full code.  · Discussed with patient, nursing  staff and ED provider.      Anand Carmen MD  [facility]  11/14/19  8:56 PM

## 2019-11-15 NOTE — SIGNIFICANT NOTE
11/15/19 1410   Rehab Time/Intention   Evaluation Not Performed (Patient passed RN swallow screen and is currently DANNA for testing. )   Rehab Treatment   Discipline speech language pathologist

## 2019-11-15 NOTE — PLAN OF CARE
Problem: Patient Care Overview  Goal: Plan of Care Review   11/15/19 1102   Coping/Psychosocial   Plan of Care Reviewed With patient   OTHER   Outcome Summary Pt 73 yo admitted to [facility] after increased BP, LUE parathesia and increased blurred vision. Pt drove self to the hospital. Pt presents to OT with c/o LUE numbness, blurried vision, LUE weakness resulting in decreaed independence with ADLs. Pt perform bed mobility with supervion, supervision for toileting activity and transfer, set-up to don socks sitting EOB. Pt will benefit from skilled OT to focus on LUE strength, functional mobility. Anticipate pt to return home with assist as needed.

## 2019-11-15 NOTE — PLAN OF CARE
Problem: Patient Care Overview  Goal: Plan of Care Review  Outcome: Ongoing (interventions implemented as appropriate)   11/15/19 3487   Coping/Psychosocial   Plan of Care Reviewed With patient   Plan of Care Review   Progress improving   OTHER   Outcome Summary Pt A+O x4. Pt hypertensive and complains of headache. Dr. Carmen was notified and PRN Labetolol was given once. BP has remained within parameters ordered. Pt has ambulated assist x1 and walker to bathroom well. Urine sample taken and sent to lab. SCDs on and Accumax on. Pt to go to MRI this morning to rule out stroke. Pt slept a bit last night and I spoke with the daughter asking to bring pt's Cipap from home to pt. Will continue to monitor and progress towards goals as tolerated.        Problem: Fall Risk (Adult)  Goal: Absence of Fall  Outcome: Ongoing (interventions implemented as appropriate)

## 2019-11-15 NOTE — THERAPY EVALUATION
Patient Name: Bird Gallegos  : 1945    MRN: [id number]                              Today's Date: 11/15/2019       Admit Date: 2019    Visit Dx:     ICD-10-CM ICD-9-CM   1. Paresthesias R20.2 782.0   2. Hypertensive urgency I16.0 401.9   3. Vision disturbance H53.9 368.9     Patient Active Problem List   Diagnosis   • STEF on auto CPAP   • Hypersomnia due to another medical condition   • Class 3 severe obesity due to excess calories with serious comorbidity and body mass index (BMI) of 45.0 to 49.9 in adult (CMS/[facility])   • Nonrheumatic aortic valve stenosis   • Aortic valve stenosis   • Coronary artery disease involving native coronary artery of native heart   • Paresthesias   • Morbid obesity with BMI of 40.0-44.9, adult (CMS/[facility])   • Diabetic neuropathy (CMS/[facility])   • Type 2 diabetes mellitus, without long-term current use of insulin (CMS/[facility])   • Hypertension     Past Medical History:   Diagnosis Date   • Anxiety and depression    • Aortic stenosis    • Arthritis    • Coronary artery disease    • Diabetes mellitus (CMS/[facility])    • Diabetic neuropathy (CMS/[facility])    • Diverticulosis    • COLVIN (dyspnea on exertion)    • GERD (gastroesophageal reflux disease)    • Heart murmur    • History of cellulitis     LEFT LEG   • Hypertension    • STEF (obstructive sleep apnea)    • STEF on auto CPAP      Past Surgical History:   Procedure Laterality Date   • BACK SURGERY      X2   • CARDIAC CATHETERIZATION N/A 3/21/2019    Procedure: Coronary angiography;  Surgeon: Wendy Braxton MD;  Location: [facility] INVASIVE LOCATION;  Service: Cardiology   • CORONARY ARTERY BYPASS GRAFT WITH AORTIC VALVE REPAIR/REPLACEMENT N/A 3/26/2019    Procedure: INTRAOP QUIQUE; CORONARY ARTERY BYPASS X 1 UTILIZING ENDOSCOPICALLY HARVESTED RIGHT GREATER SAPHENOUS VEIN; AORTIC VALVE REPLACEMENT AND PRP.;  Surgeon: Alexx Gandara MD;  Location: [facility] OR;  Service: Cardiothoracic   • POSTERIOR CERVICAL FUSION     • TENDON REPAIR Left     KNEE    • WRIST SURGERY Bilateral      General Information     Row Name 11/15/19 1013          PT Evaluation Time/Intention    Document Type  evaluation;discharge evaluation/summary  -     Mode of Treatment  physical therapy  -[facility] Name 11/15/19 1013          General Information    Patient Profile Reviewed?  yes  -EJ     Prior Level of Function  independent:;ADL's;all household mobility;community mobility;driving  -     Existing Precautions/Restrictions  no known precautions/restrictions  -     Barriers to Rehab  none identified  -[facility] Name 11/15/19 1013          Relationship/Environment    Lives With  alone  -[facility] Name 11/15/19 1013          Resource/Environmental Concerns    Current Living Arrangements  home/apartment/condo  -[facility] Name 11/15/19 1013          Home Main Entrance    Number of Stairs, Main Entrance  none  -[facility] Name 11/15/19 1013          Stairs Within Home, Primary    Number of Stairs, Within Home, Primary  none  -[facility] Name 11/15/19 1013          Cognitive Assessment/Intervention- PT/OT    Orientation Status (Cognition)  oriented x 4  -EJ       User Key  (r) = Recorded By, (t) = Taken By, (c) = Cosigned By    Initials Name Provider Type     Aleisha Lowe, PT Physical Therapist        Mobility     Row Name 11/15/19 1013          Bed Mobility Assessment/Treatment    Bed Mobility Assessment/Treatment  supine-sit  -     Supine-Sit [place] (Bed Mobility)  supervision  -[facility] Name 11/15/19 1013          Sit-Stand Transfer    Sit-Stand [place] (Transfers)  verbal cues;stand by assist;supervision  -     Assistive Device (Sit-Stand Transfers)  walker, front-wheeled  -[facility] Name 11/15/19 1013          Gait/Stairs Assessment/Training    Gait/Stairs Assessment/Training  maintains weight-bearing status  -     [place] Level (Gait)  verbal cues;contact guard;stand by assist  -     Assistive Device (Gait)  walker, front-wheeled  -      Distance in Feet (Gait)  400  -EJ     Deviations/Abnormal Patterns (Gait)  fatuma decreased;stride length decreased  -EJ     Comment (Gait/Stairs)  no gross LOB or unsteadiness noted  -EJ       User Key  (r) = Recorded By, (t) = Taken By, (c) = Cosigned By    Initials Name Provider Type    EJ Aleisha Lowe, PT Physical Therapist        Obj/Interventions     [facility] Name 11/15/19 1013          General ROM    GENERAL ROM COMMENTS  WFL  -EJ     [facility] 11/15/19 1013          MMT (Manual Muscle Testing)    General MMT Comments  WFL- appears symmetrical- pt does state LLE feels weak after ambulating   -EJ     [facility] Name 11/15/19 1013          Static Sitting Balance    Level of [place] (Unsupported Sitting, Static Balance)  independent  -EJ     Row Name 11/15/19 1013          Dynamic Sitting Balance    Level of [place], Reaches Outside Midline (Sitting, Dynamic Balance)  independent  -EJ     Row Name 11/15/19 1013          Static Standing Balance    Level of [place] (Supported Standing, Static Balance)  independent  -EJ     Row Name 11/15/19 1013          Dynamic Standing Balance    Level of [place], Reaches Outside Midline (Standing, Dynamic Balance)  supervision  -     Assistive Device Utilized (Supported Standing, Dynamic Balance)  walker, rolling  -       User Key  (r) = Recorded By, (t) = Taken By, (c) = Cosigned By    Initials Name Provider Type    Aleisha Patel, PT Physical Therapist        Goals/Plan    No documentation.       Clinical Impression     Row Name 11/15/19 1016          Pain Assessment    Additional Documentation  Pain Scale: Numbers Pre/Post-Treatment (Group)  -EJ     Row Name 11/15/19 1016          Pain Scale: Numbers Pre/Post-Treatment    Pain Scale: Numbers, Pretreatment  0/10 - no pain  -     Pain Scale: Numbers, Post-Treatment  0/10 - no pain  -EJ     Row Name 11/15/19 1016          Plan of Care Review    Plan of Care Reviewed With  patient  -[facility] Name  11/15/19 1016          Physical Therapy Clinical Impression    Patient/Family Goals Statement (PT Clinical Impression)  pt plans to reutrn home  -EJ     Criteria for Skilled Interventions Met (PT Clinical Impression)  no  -EJ     Row Name 11/15/19 1016          Vital Signs    O2 Delivery Pre Treatment  room air  -EJ     O2 Delivery Intra Treatment  room air  -EJ     O2 Delivery Post Treatment  room air  -EJ     Pre Patient Position  Supine  -EJ     Intra Patient Position  Standing  -EJ     Post Patient Position  Sitting  -EJ     Row Name 11/15/19 1016          Positioning and Restraints    Pre-Treatment Position  in bed  -EJ     Post Treatment Position  chair  -EJ     In Chair  notified nsg;sitting;call light within reach;encouraged to call for assist  -EJ       User Key  (r) = Recorded By, (t) = Taken By, (c) = Cosigned By    Initials Name Provider Type    Aleisha Patel, PT Physical Therapist        Outcome Measures     Row Name 11/15/19 1018          How much help from another person do you currently need...    Turning from your back to your side while in flat bed without using bedrails?  4  -EJ     Moving from lying on back to sitting on the side of a flat bed without bedrails?  4  -EJ     Moving to and from a bed to a chair (including a wheelchair)?  4  -EJ     Standing up from a chair using your arms (e.g., wheelchair, bedside chair)?  4  -EJ     Climbing 3-5 steps with a railing?  3  -EJ     To walk in hospital room?  3  -EJ     AM-PAC 6 Clicks Score (PT)  22  -EJ     Row Name 11/15/19 1018          Modified [place]isa Scale    Modified [place]isa Scale  1 - No significant disability despite symptoms.  Able to carry out all usual duties and activities.  -     Row Name 11/15/19 1018          Functional Assessment    Outcome Measure Options  AM-PAC 6 Clicks Basic Mobility (PT);Modified [place]  -EJ       User Key  (r) = Recorded By, (t) = Taken By, (c) = Cosigned By    Initials Name Provider Type    ANGELA  Aleisha Lowe, PT Physical Therapist          PT Recommendation and Plan     Outcome Summary/Treatment Plan (PT)  Anticipated Discharge Disposition (PT): home  Plan of Care Reviewed With: patient  Progress: improving  Outcome Summary: Pt doing well and agreeable to PT this am. He was admitted yesterday with LUE parathesia and high BP. PTA, pt was independent with mobility and ADL's. He lives alone and is still driving. Pt currently has no reports of pain or weakness. Pt states he still has some altered sensation in his hands, but it seems to be improving. Pt currently able to ambulate over 400 ft with R wx and SBA. No LOB or unsteadiness noted. Skilled PT not indiated at this time, as pt is close to baseline with mobility. Pt may ambulate with family or nsg. Will sign off.     Time Calculation:   PT Charges     Row Name 11/15/19 1022             Time Calculation    Start Time  0945  -EJ      Stop Time  1011  -EJ      Time Calculation (min)  26 min  -EJ      PT Received On  11/15/19  -EJ         Time Calculation- PT    Total Timed Code Minutes- PT  16 minute(s)  -EJ        User Key  (r) = Recorded By, (t) = Taken By, (c) = Cosigned By    Initials Name Provider Type    EJ Aleisha Lowe, PT Physical Therapist        Therapy Charges for Today     Code Description Service Date Service Provider Modifiers Qty    [id number]  PT EVAL MOD COMPLEXITY 2 11/15/2019 Aleisha Lowe, PT GP 1    [id number] HC PT THER PROC EA 15 MIN 11/15/2019 Aleisha Lowe, PT GP 1          PT G-Codes  Outcome Measure Options: AM-PAC 6 Clicks Basic Mobility (PT), Modified [place]  AM-PAC 6 Clicks Score (PT): 22  Modified [place] Scale: 1 - No significant disability despite symptoms.  Able to carry out all usual duties and activities.    Aleisha Lowe PT  11/15/2019

## 2019-11-15 NOTE — NURSING NOTE
Acute rehab referral received via stroke order set. Patient noted to be very high level with therapies. No acute rehab needs at this time. Will sign off.

## 2019-11-15 NOTE — CONSULTS
Neurology Consult Note    Consult Date: 11/15/2019    Referring MD: Anand Carmen MD    Reason for Consult I have been asked to see the patient in neurological consultation to render advice and opinion regarding left UE numbness    Bird Gallegos is a 74 y.o. male with PMH of aortic stenosis, CAD, STEF. He presented to the ED complaining of left UE numbness.  This started yesterday when he went to the dentist for a planned tooth extraction.  He reports acute onset of left face arm and leg numbness.  This started suddenly and felt like he had gotten anesthesia on the left side.  If he touched his arm it felt totally numb.  He denies lexie paresthesias.  He endorses a little bit of hand clumsiness on the left but denies difficulty walking apart from his baseline left lower extremity radiculopathy.  His symptoms have not improved since admission.  Blood pressure was initially in the 200s systolic and has been brought down gradually overnight to 160 this morning.    Past Medical/Surgical Hx:  Past Medical History:   Diagnosis Date   • Anxiety and depression    • Aortic stenosis    • Arthritis    • Coronary artery disease    • Diabetes mellitus (CMS/HCC)    • Diabetic neuropathy (CMS/HCC)    • Diverticulosis    • COLVIN (dyspnea on exertion)    • GERD (gastroesophageal reflux disease)    • Heart murmur    • History of cellulitis     LEFT LEG   • Hypertension    • STEF (obstructive sleep apnea)    • STEF on auto CPAP      Past Surgical History:   Procedure Laterality Date   • BACK SURGERY      X2   • CARDIAC CATHETERIZATION N/A 3/21/2019    Procedure: Coronary angiography;  Surgeon: Wendy Braxton MD;  Location: [facility] INVASIVE LOCATION;  Service: Cardiology   • CORONARY ARTERY BYPASS GRAFT WITH AORTIC VALVE REPAIR/REPLACEMENT N/A 3/26/2019    Procedure: INTRAOP QUIQUE; CORONARY ARTERY BYPASS X 1 UTILIZING ENDOSCOPICALLY HARVESTED RIGHT GREATER SAPHENOUS VEIN; AORTIC VALVE REPLACEMENT AND PRP.;  Surgeon: Alexx Gandara MD;   Location: [facility] MAIN OR;  Service: Cardiothoracic   • POSTERIOR CERVICAL FUSION     • TENDON REPAIR Left     KNEE   • WRIST SURGERY Bilateral        Medications On Admission  Medications Prior to Admission   Medication Sig Dispense Refill Last Dose   • ascorbic acid (VITAMIN C) 1000 MG tablet Take 1,000 mg by mouth Daily.   11/14/2019 at Unknown time   • atorvastatin (LIPITOR) 10 MG tablet Take 10 mg by mouth Daily.   11/14/2019 at Unknown time   • BABY ASPIRIN PO Take 81 mg by mouth Daily.   11/14/2019 at Unknown time   • carvedilol (COREG) 25 MG tablet Take 1 tablet by mouth Every 12 (Twelve) Hours. 60 tablet 2 11/14/2019 at Unknown time   • clonazePAM (KlonoPIN) 1 MG disintegrating tablet Take 2 mg by mouth Every Night.   11/13/2019 at Unknown time   • furosemide (LASIX) 40 MG tablet Take 40 mg by mouth Daily.   11/13/2019 at Unknown time   • gabapentin (NEURONTIN) 300 MG capsule Take 300 mg by mouth 3 (Three) Times a Day.   11/14/2019 at Unknown time   • glimepiride (AMARYL) 2 MG tablet Take 2 mg by mouth Every Morning Before Breakfast.   11/14/2019 at Unknown time   • loratadine (CLARITIN) 10 MG tablet Take 10 mg by mouth Daily.   11/14/2019 at Unknown time   • metFORMIN (GLUCOPHAGE) 1000 MG tablet Take 1,000 mg by mouth 2 (Two) Times a Day With Meals.   11/14/2019 at Unknown time   • potassium chloride (K-DUR,KLOR-CON) 10 MEQ CR tablet Take 10 mEq by mouth Daily.   11/13/2019 at Unknown time   • sertraline (ZOLOFT) 50 MG tablet Take 50 mg by mouth Daily.   11/14/2019 at Unknown time   • tamsulosin (FLOMAX) 0.4 MG capsule 24 hr capsule Take 1 capsule by mouth Every Night.   11/14/2019 at Unknown time       Allergies:  Allergies   Allergen Reactions   • Contrast Dye Hives     Hives and rash reported       Social Hx:  Social History     Socioeconomic History   • Marital status:      Spouse name: Not on file   • Number of children: Not on file   • Years of education: Not on file   • Highest education  "level: Not on file   Tobacco Use   • Smoking status: Former Smoker     Packs/day: 0.50     Types: Cigarettes     Last attempt to quit:      Years since quittin.8   • Smokeless tobacco: Never Used   • Tobacco comment: Smoked between the ages of 25 and 40.   Substance and Sexual Activity   • Alcohol use: No   • Drug use: No   • Sexual activity: Defer       Family Hx:  Family History   Problem Relation Age of Onset   • Heart disease Mother    • Cancer Father    • Hypertension Brother    • Hypertension Brother    • Malig Hyperthermia Neg Hx        Review of systems  Constitutional: [No fevers, chills]  Eye: [+ Blurred vision, no eye discharge]  Respiratory: [No shortness of breath, cough]  Cardiovascular: [No Chest pain, palpitations]  Neurologic: [+ weakness, + numbness]  Psychiatric: [No anxiety, depression]    All other systems reviewed and are negative    Exam    BP (!) 160/103 (BP Location: Right arm, Patient Position: Lying)   Pulse 67   Temp 97.7 °F (36.5 °C) (Oral)   Resp 18   Ht 172.7 cm (68\")   Wt 127 kg (279 lb)   SpO2 95%   BMI 42.42 kg/m²   gen: NAD, vitals reviewed  Eyes: fundus sharp with no papilledema or retinal hemorrhages  HEENT: no nuchal rigidity  CVS: RRR, S1, S2  MS: oriented x3, recent/remote memory intact, normal attention/concentration, language intact, no neglect, normal fund of knowledge  CN:  decreased visual acuity right eye (baseline), visual fields full, PERRL, EOMI, diminished facial sensation to cold temperature left side V1, V2, V3, no facial droop, hearing symmetric, palate elevates symmetrically, shoulder shrug equal, tongue midline  Motor: 5/5 throughout upper and lower extremities, normal tone  Sensation: Diminished to cold temperature and vibration left upper and lower extremity  Reflexes: 2+ throughout upper and lower extremities, downgoing plantars  Coordination: no dysmetria with finger to nose bilaterally  Gait: no ataxia, normal station    DATA:    Lab Results "   Component Value Date    GLUCOSE 143 (H) 11/15/2019    CALCIUM 8.8 11/15/2019     11/15/2019    K 3.7 11/15/2019    CO2 27.6 11/15/2019     11/15/2019    BUN 14 11/15/2019    CREATININE 0.94 11/15/2019    EGFRIFNONA 78 11/15/2019    BCR 14.9 11/15/2019    ANIONGAP 11.4 11/15/2019     Lab Results   Component Value Date    WBC 8.27 11/15/2019    HGB 14.3 11/15/2019    HCT 44.3 11/15/2019    MCV 85.7 11/15/2019     11/15/2019     Lab Results   Component Value Date    LDL 60 11/15/2019    LDL 45 10/03/2019    LDL 53 05/30/2019     Lab Results   Component Value Date    HGBA1C 6.50 (H) 11/15/2019     Lab Results   Component Value Date    INR 1.21 (H) 03/27/2019    INR 1.28 (H) 03/26/2019    INR 0.98 03/25/2019    PROTIME 15.1 (H) 03/27/2019    PROTIME 15.8 (H) 03/26/2019    PROTIME 12.8 03/25/2019       Lab review: CBC, BMP unremarkable, HBA1C 6.5, LDL 60    Imaging review: I personally reviewed his MRI brain wo contrast which shows moderate small vessel disease, no acute stroke, radiology report reviewed.    Diagnoses:  Essential hypertension  Right thalamic stroke secondary to small vessel disease  Left lower extremity radiculopathy    Comment: History and exam are consistent with clinical right thalamic stroke.  MRI negative, suspect a small infarct in the lateral thalamus.    PLAN:  Gradual normalization of BP  Plavix load, continue low-dose aspirin,  ASA 81/plavix 75 x 30 days then switch to plavix alone  Lipitor 40 x 30 days then decrease back to low dose  Bilateral carotid U/S  2D echo

## 2019-11-15 NOTE — CONSULTS
[facility] Consult Note    Patient Name: Bird Gallegos  :1945  74 y.o.    Date of Admission: 2019  Date of Consultation:  11/15/19  Encounter Provider: Rose Marie Bruno MD  Place of Service: [facility] CARDIOLOGY  Referring Provider: Anand Carmen MD  Patient Care Team:  Mariya Lopez MD as PCP - General (Internal Medicine)  Rudy Palacios DPM as PCP - Claims Attributed  Wendy Braxton MD as Consulting Physician (Cardiology)  Jensen Mas MD as Consulting Physician (Pulmonary Disease)      Chief complaint: blurred vision, headache    Reason for Consult: hypertension    History of Present Illness: Mr Gallegos is a 74 year old male patient with history of coronary artery disease, hypertensin, diabetes mellitus type 2, obstructive sleep apnea, aortic stenosis status post aortic valve replacement in 3/2019, who is admitted with left upper extremity parasthesias and elevated blood pressures.      He presented to [facility] ED 19 with reports of dizziness and associated left-sided numbness, blurred vision, headache, shortness of breath and generalized weakness. He had been seen by his oral surgeon earlier in the day for a tooth extraction and was found to have elevated blood pressure of 200/120 in the office and subsequently sent to the ED. Blood pressure upon arrival to the ED was 189/116. HR 89. CT head obtained noted no acute processes. He received aspirin, IV hydralazine and was admitted for stroke workup. Neurology consult is pending. His blood pressures have remained elevated SBP has remained elevated at 160-200's, for which we have been consulted. His home carvedilol has been resumed with some improvement in blood pressures.       The patient was not seen until this afternoon because he was in MRI this morning when I went by to see him.  He denies any chest pain or palpitations.  He reports some dyspnea on exertion over the last  couple of days and reports overwhelming weakness yesterday before his admission.  His left sided numbness has improved some.      He was originally seen by Dr. Braxton in March 2019 after his PCP noted significant murmur and an echocardiogram performed noted severe AS with normal left ventricular function. He subsequently underwent cardiac catheterization with Dr. Braxton 3/21/19 showing a single-vessel coronary disease of the OM. Due to his coronary disease and severe aortic stenosis, he underwent surgical AVR with single-vessel vein graft bypass to the OM.  When he was last seen in the office in 7/2019 his blood pressures were noted to be well controlled.  He does not check his blood pressures at home regularly and is unaware of what they have been running recently.      Previous Cardiac Testing:    Echocardiogram 3/21/19  · Left ventricular systolic function is normal. Calculated EF = 53.0%. Estimated EF was in disagreement with the calculated EF. Estimated EF appears to be in the range of 61 - 65%. Normal left ventricular cavity size noted. All left ventricular wall segments contract normally. Left ventricular wall thickness is consistent with mild concentric hypertrophy. Left ventricular diastolic dysfunction is noted (grade I) consistent with impaired relaxation.  · Left atrial cavity size is moderately dilated.  · Severe aortic valve stenosis is present.    Cardiac Catheterization 3/21/19  1.  CORONARY ANGIOGRAPHY:   Left main: Large caliber normal left main coronary artery which bifurcates into the LAD and circumflex arteries.  LAD: The LAD is a medium caliber vessel which tapers a small caliber distally.  There is mild, 20-30% disease in the mid segment proximal to the takeoff of the second diagonal.  The LAD is otherwise normal.  The first and second diagonal are small caliber vessels which are normal.  Left circumflex: The circumflex is a medium caliber vessel which is normal in the proximal segment.  The  first OM is medium caliber bifurcating and normal.  The mid circumflex has mild to moderate disease no more than 40%.  The OM 2 is a medium caliber vessel which has a 60-70% stenosis in the distal segment.  The ongoing  AV groove circumflex is a small caliber vessel.  RCA: Medium caliber, dominant right coronary artery that has luminal irregularities in the mid and distal segment.  The RPDA and PL are small caliber, normal vessels.   SUMMARY: Small vessel disease of the distal OM2.    RECOMMENDATIONS: Pursue further AVR planning.         Past Medical History:   Diagnosis Date   • Anxiety and depression    • Aortic stenosis    • Arthritis    • Coronary artery disease    • Diabetes mellitus (CMS/HCC)    • Diabetic neuropathy (CMS/HCC)    • Diverticulosis    • COLVIN (dyspnea on exertion)    • GERD (gastroesophageal reflux disease)    • Heart murmur    • History of cellulitis     LEFT LEG   • Hypertension    • STEF (obstructive sleep apnea)    • STEF on auto CPAP        Past Surgical History:   Procedure Laterality Date   • BACK SURGERY      X2   • CARDIAC CATHETERIZATION N/A 3/21/2019    Procedure: Coronary angiography;  Surgeon: Wendy Braxton MD;  Location: [facility] INVASIVE LOCATION;  Service: Cardiology   • CORONARY ARTERY BYPASS GRAFT WITH AORTIC VALVE REPAIR/REPLACEMENT N/A 3/26/2019    Procedure: INTRAOP QUIQUE; CORONARY ARTERY BYPASS X 1 UTILIZING ENDOSCOPICALLY HARVESTED RIGHT GREATER SAPHENOUS VEIN; AORTIC VALVE REPLACEMENT AND PRP.;  Surgeon: Alexx Gandara MD;  Location: [facility] OR;  Service: Cardiothoracic   • POSTERIOR CERVICAL FUSION     • TENDON REPAIR Left     KNEE   • WRIST SURGERY Bilateral          Prior to Admission medications    Medication Sig Start Date End Date Taking? Authorizing Provider   ascorbic acid (VITAMIN C) 1000 MG tablet Take 1,000 mg by mouth Daily. 1/4/17  Yes Rcahel Vera MD   atorvastatin (LIPITOR) 10 MG tablet Take 10 mg by mouth Daily.   Yes Rachel Vera MD    BABY ASPIRIN PO Take 81 mg by mouth Daily.   Yes Rachel Vera MD   carvedilol (COREG) 25 MG tablet Take 1 tablet by mouth Every 12 (Twelve) Hours. 3/30/19  Yes Jessica Cai APRN   clonazePAM (KlonoPIN) 1 MG disintegrating tablet Take 2 mg by mouth Every Night. 19  Yes Rachel Vera MD   furosemide (LASIX) 40 MG tablet Take 40 mg by mouth Daily. 19  Yes Rachel Vera MD   gabapentin (NEURONTIN) 300 MG capsule Take 300 mg by mouth 3 (Three) Times a Day.   Yes Rachel Vera MD   glimepiride (AMARYL) 2 MG tablet Take 2 mg by mouth Every Morning Before Breakfast. 19  Yes Rachel Vrea MD   loratadine (CLARITIN) 10 MG tablet Take 10 mg by mouth Daily. 17  Yes Rachel Vera MD   metFORMIN (GLUCOPHAGE) 1000 MG tablet Take 1,000 mg by mouth 2 (Two) Times a Day With Meals.   Yes Rachel Vera MD   potassium chloride (K-DUR,KLOR-CON) 10 MEQ CR tablet Take 10 mEq by mouth Daily. 19  Yes Rachel Vera MD   sertraline (ZOLOFT) 50 MG tablet Take 50 mg by mouth Daily. 4/10/17  Yes Rachel Vera MD   tamsulosin (FLOMAX) 0.4 MG capsule 24 hr capsule Take 1 capsule by mouth Every Night.   Yes Rachel Vera MD       Allergies   Allergen Reactions   • Contrast Dye Hives     Hives and rash reported       Social History     Socioeconomic History   • Marital status:      Spouse name: Not on file   • Number of children: Not on file   • Years of education: Not on file   • Highest education level: Not on file   Tobacco Use   • Smoking status: Former Smoker     Packs/day: 0.50     Types: Cigarettes     Last attempt to quit:      Years since quittin.8   • Smokeless tobacco: Never Used   • Tobacco comment: Smoked between the ages of 25 and 40.   Substance and Sexual Activity   • Alcohol use: No   • Drug use: No   • Sexual activity: Defer       Family History   Problem Relation Age of Onset   • Heart disease Mother    •  Cancer Father    • Hypertension Brother    • Hypertension Brother    • Malig Hyperthermia Neg Hx        REVIEW OF SYSTEMS:   All systems reviewed.  Pertinent positives identified in HPI.  All other systems are negative.      Objective:     Vitals:    11/14/19 2103 11/14/19 2154 11/15/19 0156 11/15/19 0614   BP: (!) 207/100 (!) 216/112 169/86 178/93   BP Location: Right arm Right arm  Left arm   Patient Position: Lying Lying  Lying   Pulse: 75  75 70   Resp: 16   16   Temp:  97.8 °F (36.6 °C)  97.8 °F (36.6 °C)   TempSrc:  Oral  Oral   SpO2: 93%   94%   Weight:       Height:         Body mass index is 42.42 kg/m².    General Appearance:    Alert, cooperative, in no acute distress   Head:    Normocephalic, without obvious abnormality, atraumatic   Eyes:            Lids and lashes normal, conjunctivae and sclerae normal, no icterus, no pallor, corneas clear, PERRLA   Ears:    Ears appear intact with no abnormalities noted   Neck:   No adenopathy, supple, trachea midline, no thyromegaly, no carotid bruit, no JVD   Lungs:     Clear to auscultation, respirations regular, even and unlabored    Heart:    Regular rhythm and normal rate, normal S1 and S2, no murmur, no gallop, no rub, no click   Chest Wall:    No abnormalities observed   Abdomen:     Normal bowel sounds, no masses, no organomegaly, soft, nontender, nondistended, no guarding, no rebound  tenderness   Extremities:   Moves all extremities well, no edema, no cyanosis, no redness   Pulses:   Pulses palpable and equal bilaterally. Normal radial, carotid, femoral, dorsalis pedis and posterior tibial pulses bilaterally. Normal abdominal aorta   Skin:  Psychiatric:   No bleeding, bruising or rash    Alert and oriented x 3, normal mood and affect   Lab Review:     Results from last 7 days   Lab Units 11/15/19  0525   SODIUM mmol/L 142   POTASSIUM mmol/L 3.7   CHLORIDE mmol/L 103   CO2 mmol/L 27.6   BUN mg/dL 14   CREATININE mg/dL 0.94   CALCIUM mg/dL 8.8   BILIRUBIN  mg/dL 0.8   ALK PHOS U/L 59   ALT (SGPT) U/L 16   AST (SGOT) U/L 17   GLUCOSE mg/dL 143*         Results from last 7 days   Lab Units 11/15/19  0525   WBC 10*3/mm3 8.27   HEMOGLOBIN g/dL 14.3   HEMATOCRIT % 44.3   PLATELETS 10*3/mm3 179             Results from last 7 days   Lab Units 11/15/19  0525   CHOLESTEROL mg/dL 130   TRIGLYCERIDES mg/dL 215*   HDL CHOL mg/dL 27*   LDL CHOL mg/dL 60              EKG 11/14/19    Previous EKG 7/30/19                I personally viewed and interpreted the patient's EKG/Telemetry data.        Assessment and Plan:       1. Hypertension. Some improvement with resumption of carvedilol and furosemide but still elevated.    2. Left upper extremity parasthesia. MRI normal but Neurology suspects right thalamic stroke due to small vessel disease.  Started on aspirin and clopidogrel for 1 month and then plans to switch to clopidogrel only.    3. History of aortic stenosis status post bioprosthetic AVR in 3/2019. Valve appears to be functioning normally.   4. Coronary artery disease status post CABG x 1 in 3/2019.  EKG unchanged.  Echo with normal LV function.   5. Hyperlipidemia  6. Dyspnea.  Echo with normal LV systolic function and only grade 1 diastolic function.  BNP normal.    7. Hyperlipidemia.  Started on atorvastatin 40 mg for 1 month and then reduce to low dose.      -  Will start lisinopril for BP control.  Will start at 10 mg so can try and lower BP slowly.  Will uptitrate dose as needed.   -  Continue carvedilol and furosemide.     Rose Marie Bruno MD  11/15/19  7:29 AM

## 2019-11-16 ENCOUNTER — APPOINTMENT (OUTPATIENT)
Dept: CARDIOLOGY | Facility: HOSPITAL | Age: 74
End: 2019-11-16

## 2019-11-16 ENCOUNTER — NURSE TRIAGE (OUTPATIENT)
Dept: CALL CENTER | Facility: HOSPITAL | Age: 74
End: 2019-11-16

## 2019-11-16 VITALS
DIASTOLIC BLOOD PRESSURE: 88 MMHG | HEIGHT: 68 IN | TEMPERATURE: 97.5 F | RESPIRATION RATE: 18 BRPM | BODY MASS INDEX: 42.28 KG/M2 | WEIGHT: 279 LBS | HEART RATE: 82 BPM | SYSTOLIC BLOOD PRESSURE: 163 MMHG | OXYGEN SATURATION: 96 %

## 2019-11-16 PROBLEM — I63.81 THALAMIC STROKE: Status: ACTIVE | Noted: 2019-11-16

## 2019-11-16 LAB
GLUCOSE BLDC GLUCOMTR-MCNC: 103 MG/DL (ref 70–130)
GLUCOSE BLDC GLUCOMTR-MCNC: 136 MG/DL (ref 70–130)
GLUCOSE BLDC GLUCOMTR-MCNC: 202 MG/DL (ref 70–130)

## 2019-11-16 PROCEDURE — 0296T HC EXT ECG > 48HR TO 21 DAY RCRD W/CONECT INTL RCRD: CPT

## 2019-11-16 PROCEDURE — 82962 GLUCOSE BLOOD TEST: CPT

## 2019-11-16 PROCEDURE — 63710000001 INSULIN GLARGINE PER 5 UNITS: Performed by: HOSPITALIST

## 2019-11-16 PROCEDURE — 99232 SBSQ HOSP IP/OBS MODERATE 35: CPT | Performed by: NURSE PRACTITIONER

## 2019-11-16 PROCEDURE — 99231 SBSQ HOSP IP/OBS SF/LOW 25: CPT | Performed by: NURSE PRACTITIONER

## 2019-11-16 PROCEDURE — 63710000001 INSULIN LISPRO (HUMAN) PER 5 UNITS: Performed by: HOSPITALIST

## 2019-11-16 PROCEDURE — 25010000002 CYANOCOBALAMIN PER 1000 MCG: Performed by: NURSE PRACTITIONER

## 2019-11-16 RX ORDER — LISINOPRIL 10 MG/1
10 TABLET ORAL
Qty: 30 TABLET | Refills: 0 | Status: SHIPPED | OUTPATIENT
Start: 2019-11-17 | End: 2019-12-17

## 2019-11-16 RX ORDER — CHOLECALCIFEROL (VITAMIN D3) 125 MCG
1000 CAPSULE ORAL DAILY
Status: DISCONTINUED | OUTPATIENT
Start: 2019-11-17 | End: 2019-11-16 | Stop reason: HOSPADM

## 2019-11-16 RX ORDER — CLOPIDOGREL BISULFATE 75 MG/1
75 TABLET ORAL DAILY
Qty: 30 TABLET | Refills: 0 | Status: SHIPPED | OUTPATIENT
Start: 2019-11-17 | End: 2019-12-17

## 2019-11-16 RX ORDER — CYANOCOBALAMIN 1000 UG/ML
1000 INJECTION, SOLUTION INTRAMUSCULAR; SUBCUTANEOUS ONCE
Status: COMPLETED | OUTPATIENT
Start: 2019-11-16 | End: 2019-11-16

## 2019-11-16 RX ORDER — ATORVASTATIN CALCIUM 40 MG/1
40 TABLET, FILM COATED ORAL NIGHTLY
Qty: 30 TABLET | Refills: 0 | Status: SHIPPED | OUTPATIENT
Start: 2019-11-16 | End: 2019-12-03 | Stop reason: DRUGHIGH

## 2019-11-16 RX ADMIN — ACETAMINOPHEN 650 MG: 325 TABLET, FILM COATED ORAL at 16:12

## 2019-11-16 RX ADMIN — INSULIN GLARGINE 15 UNITS: 100 INJECTION, SOLUTION SUBCUTANEOUS at 08:35

## 2019-11-16 RX ADMIN — LISINOPRIL 10 MG: 10 TABLET ORAL at 08:40

## 2019-11-16 RX ADMIN — CLOPIDOGREL 75 MG: 75 TABLET, FILM COATED ORAL at 08:40

## 2019-11-16 RX ADMIN — INSULIN LISPRO 5 UNITS: 100 INJECTION, SOLUTION INTRAVENOUS; SUBCUTANEOUS at 18:15

## 2019-11-16 RX ADMIN — INSULIN LISPRO 5 UNITS: 100 INJECTION, SOLUTION INTRAVENOUS; SUBCUTANEOUS at 13:14

## 2019-11-16 RX ADMIN — CYANOCOBALAMIN 1000 MCG: 1000 INJECTION, SOLUTION INTRAMUSCULAR; SUBCUTANEOUS at 18:15

## 2019-11-16 RX ADMIN — INSULIN LISPRO 5 UNITS: 100 INJECTION, SOLUTION INTRAVENOUS; SUBCUTANEOUS at 08:40

## 2019-11-16 RX ADMIN — FUROSEMIDE 40 MG: 40 TABLET ORAL at 08:40

## 2019-11-16 RX ADMIN — CARVEDILOL 25 MG: 25 TABLET, FILM COATED ORAL at 08:42

## 2019-11-16 RX ADMIN — GABAPENTIN 300 MG: 300 CAPSULE ORAL at 16:08

## 2019-11-16 RX ADMIN — ASPIRIN 81 MG: 81 TABLET, CHEWABLE ORAL at 08:40

## 2019-11-16 RX ADMIN — SERTRALINE 50 MG: 50 TABLET, FILM COATED ORAL at 08:40

## 2019-11-16 RX ADMIN — POTASSIUM CHLORIDE 10 MEQ: 750 CAPSULE, EXTENDED RELEASE ORAL at 08:40

## 2019-11-16 RX ADMIN — GABAPENTIN 300 MG: 300 CAPSULE ORAL at 08:40

## 2019-11-16 NOTE — PLAN OF CARE
Problem: Patient Care Overview  Goal: Plan of Care Review  Outcome: Ongoing (interventions implemented as appropriate)   11/16/19 0666   Coping/Psychosocial   Plan of Care Reviewed With patient   Plan of Care Review   Progress improving   OTHER   Outcome Summary Pt A&O, HTN present. BP medication given early. Night time clonazepam given. Pt increasingly difficult to arouse, requiring vigorous and painful stimuli. Once aroused, patient was able to be aroused with mild shoulder tapping. BP stable. Sensory issues continue to improve. No s/s of distress and no c/o. Poss D/C 11/16/19. CTM

## 2019-11-16 NOTE — PLAN OF CARE
Problem: Patient Care Overview  Goal: Plan of Care Review  Outcome: Ongoing (interventions implemented as appropriate)   11/16/19 2780   Coping/Psychosocial   Plan of Care Reviewed With patient   Plan of Care Review   Progress improving   OTHER   Outcome Summary Offered BSE to pt but he denied any swallowing issues and declined further evaluation. ST to sign off.

## 2019-11-16 NOTE — PLAN OF CARE
Problem: Patient Care Overview  Goal: Plan of Care Review  Outcome: Ongoing (interventions implemented as appropriate)   11/15/19 1946   Coping/Psychosocial   Plan of Care Reviewed With patient   Plan of Care Review   Progress improving   OTHER   Outcome Summary Pt A&Ox4, VSS - BP still elevated, PRN labetolol given once and lisinopril was added. MRI was negative but Dr. Hargrove still seems to think pt had CVA so we're treating it accordingly. NIH=1 for mild sensory issues. Plan for possible D/C tomorrow, will continue to monitor.

## 2019-11-16 NOTE — PROGRESS NOTES
Cardiology Progress Note    Patient Identification:  Name: Bird Gallegos  Age: 74 y.o.  Sex: male  :  1945  MRN: [id number]                 Follow Up / Chief Complaint: blurred vision, HA consult for HTN    Interval History:  Neurology feels right thalamic CVA likely from small vessel disease.  BP's have improved with med adjustments.  Neurology has signed off.  He feels better.  Denies orthopnea.  Only complaint is right temporal HA 5/10.  He does have some swelling lower extremities which he states comes and goes at home.  He usually increases lasix to BID for a week per Dr. Braxton.  Left leg numbness remains as does visual disturbances.             Past Medical History:  Past Medical History:   Diagnosis Date   • Anxiety and depression    • Aortic stenosis    • Arthritis    • Coronary artery disease    • Diabetes mellitus (CMS/HCC)    • Diabetic neuropathy (CMS/HCC)    • Diverticulosis    • COLVIN (dyspnea on exertion)    • GERD (gastroesophageal reflux disease)    • Heart murmur    • History of cellulitis     LEFT LEG   • Hypertension    • STEF (obstructive sleep apnea)    • STEF on auto CPAP      Past Surgical History:  Past Surgical History:   Procedure Laterality Date   • BACK SURGERY      X2   • CARDIAC CATHETERIZATION N/A 3/21/2019    Procedure: Coronary angiography;  Surgeon: Wendy Braxton MD;  Location: [facility] INVASIVE LOCATION;  Service: Cardiology   • CORONARY ARTERY BYPASS GRAFT WITH AORTIC VALVE REPAIR/REPLACEMENT N/A 3/26/2019    Procedure: INTRAOP QUIQUE; CORONARY ARTERY BYPASS X 1 UTILIZING ENDOSCOPICALLY HARVESTED RIGHT GREATER SAPHENOUS VEIN; AORTIC VALVE REPLACEMENT AND PRP.;  Surgeon: Alexx Gandara MD;  Location: [facility] OR;  Service: Cardiothoracic   • POSTERIOR CERVICAL FUSION     • TENDON REPAIR Left     KNEE   • WRIST SURGERY Bilateral         Social History:   Social History     Tobacco Use   • Smoking status: Former Smoker     Packs/day: 0.50     Types: Cigarettes     Last  attempt to quit:      Years since quittin.8   • Smokeless tobacco: Never Used   • Tobacco comment: Smoked between the ages of 25 and 40.   Substance Use Topics   • Alcohol use: No      Family History:  Family History   Problem Relation Age of Onset   • Heart disease Mother    • Cancer Father    • Hypertension Brother    • Hypertension Brother    • Malig Hyperthermia Neg Hx           Allergies:  Allergies   Allergen Reactions   • Contrast Dye Hives     Hives and rash reported     Scheduled Meds:    aspirin 81 mg Daily   atorvastatin 40 mg Nightly   carvedilol 25 mg Q12H   clonazePAM 2 mg Nightly   clopidogrel 75 mg Daily   cyanocobalamin 1,000 mcg Once   furosemide 40 mg Daily   gabapentin 300 mg TID   insulin glargine 15 Units QAM   insulin lispro 0-9 Units 4x Daily With Meals & Nightly   insulin lispro 5 Units TID With Meals   lisinopril 10 mg Q24H   potassium chloride 10 mEq Daily   sertraline 50 mg Daily   tamsulosin 0.4 mg Nightly   [START ON 2019] vitamin B-12 1,000 mcg Daily           INTAKE AND OUTPUT:  No intake or output data in the 24 hours ending 19 1331    Constitutional:  Temp:  [96 °F (35.6 °C)-98 °F (36.7 °C)] 96 °F (35.6 °C)  Heart Rate:  [59-82] 82  Resp:  [16-19] 17  BP: (108-190)/() 141/81    Physical Exam:  General:  Appears in no acute distress  Eyes: non icteric no conjunctival drainage  HEENT:  No JVD. Thyroid not visibly enlarged. No mucosal pallor or cyanosis  Respiratory: Respirations regular and unlabored at rest. BBS with good air entry in all fields. No crackles, rubs or wheezes auscultated  Cardiovascular: S1S2 Regular rate and rhythm. No murmur, rub or gallop. No carotid bruits. DP/PT pulses  2+   .  1+ pretibial pitting edema  Gastrointestinal: Abdomen soft, obese, non tender. Bowel sounds present. No hepatosplenomegaly. No ascites  Musculoskeletal: MICHAEL x4. No abnormal movements  Extremities: No digital clubbing or cyanosis  Skin: Color pink. Skin warm and  dry to touch. No rashes    Neuro: AAO x3 CN II-XII grossly intact; hand  a bit weak bilaterally.  He is right handed.    Psych: Mood and affect normal, pleasant and cooperative          Cardiographics  Telemetry:     11/16/19  SR.         ECG:     EKG 11/14/19    Previous EKG 7/30/19           I personally viewed and interpreted the patient's EKG/Telemetry data.      Echocardiogram:       Interpretation Summary 11/15/19     · Calculated EF = 70%.  · Left ventricular systolic function is normal.  · Left ventricular diastolic dysfunction (grade I) consistent with impaired relaxation.  · Left atrial cavity size is moderately dilated.  · Right atrial cavity size is moderately dilated.  · There is a prosthetic aortic valve.  · The prosthetic valve is normal.  · Trace-to-mild mitral valve regurgitation is present with a centrally-directed jet noted.  · Saline test results are negative.               Previous Cardiac Testing:     Echocardiogram 3/21/19  · Left ventricular systolic function is normal. Calculated EF = 53.0%. Estimated EF was in disagreement with the calculated EF. Estimated EF appears to be in the range of 61 - 65%. Normal left ventricular cavity size noted. All left ventricular wall segments contract normally. Left ventricular wall thickness is consistent with mild concentric hypertrophy. Left ventricular diastolic dysfunction is noted (grade I) consistent with impaired relaxation.  · Left atrial cavity size is moderately dilated.  · Severe aortic valve stenosis is present.     Cardiac Catheterization 3/21/19  1.  CORONARY ANGIOGRAPHY:   Left main: Large caliber normal left main coronary artery which bifurcates into the LAD and circumflex arteries.  LAD: The LAD is a medium caliber vessel which tapers a small caliber distally.  There is mild, 20-30% disease in the mid segment proximal to the takeoff of the second diagonal.  The LAD is otherwise normal.  The first and second diagonal are small caliber  vessels which are normal.  Left circumflex: The circumflex is a medium caliber vessel which is normal in the proximal segment.  The first OM is medium caliber bifurcating and normal.  The mid circumflex has mild to moderate disease no more than 40%.  The OM 2 is a medium caliber vessel which has a 60-70% stenosis in the distal segment.  The ongoing  AV groove circumflex is a small caliber vessel.  RCA: Medium caliber, dominant right coronary artery that has luminal irregularities in the mid and distal segment.  The RPDA and PL are small caliber, normal vessels.   SUMMARY: Small vessel disease of the distal OM2.    RECOMMENDATIONS: Pursue further AVR planning.           Lab Review           Results from last 7 days   Lab Units 11/15/19  0525   SODIUM mmol/L 142   POTASSIUM mmol/L 3.7   BUN mg/dL 14   CREATININE mg/dL 0.94   CALCIUM mg/dL 8.8       Results from last 7 days   Lab Units 11/15/19  0525 11/14/19  1208   WBC 10*3/mm3 8.27 7.41   HEMOGLOBIN g/dL 14.3 15.6   HEMATOCRIT % 44.3 45.6   PLATELETS 10*3/mm3 179 177             Assessment/Plan:     1. Hypertension. much Improved with resumption of carvedilol,furosemide, and addition of ACE. Is also on flomax.    2. Left upper extremity parasthesia. MRI normal but Neurology suspects right thalamic stroke due to small vessel disease.  Started on aspirin and clopidogrel for 1 month and then plans to switch to clopidogrel only.  Neurology has signed off  3. History of aortic stenosis status post bioprosthetic AVR in 3/2019. Valve appears to be functioning normally by echo 11/15/19. .   4. Coronary artery disease status post CABG x 1 in 3/2019.  EKG unchanged.  Echo with normal LV function.   5. Hyperlipidemia  6. Dyspnea.  Echo with normal LV systolic function and only grade 1 diastolic function.  neg saline contrast study.  BNP normal.    7. Hyperlipidemia.  Started on atorvastatin 40 mg for 1 month and then reduce to low dose.    8.  Lower extremity swelling:  "chronic. Continue lasix.  Would not give any additional IV diuretic as BP at time 120's.  Do not want to drop BP too quickly.    9.  HA: Right temporal new.  Informed RN.        Neurology is recommending Zios patch at discharge.  Agree with further rhythm surveillance at discharge.  I have ordered the Zios.  RN will need to call once IM feels ready for discharge.           )11/16/2019  Concetta Escobar, KEVIN Bhat/Transcription:   \"Dictated utilizing Dragon dictation\".   "

## 2019-11-16 NOTE — PLAN OF CARE
Problem: Patient Care Overview  Goal: Plan of Care Review  Outcome: Ongoing (interventions implemented as appropriate)   11/16/19 0923 11/16/19 1627   Coping/Psychosocial   Plan of Care Reviewed With patient --    Plan of Care Review   Progress improving --    OTHER   Outcome Summary --  Pt A&Ox4. Ambulates without assistance. BP stable. Cardio and Nuero ok with discharge. Will need Zio patch. VSS will monitor     Goal: Individualization and Mutuality  Outcome: Ongoing (interventions implemented as appropriate)    Goal: Discharge Needs Assessment  Outcome: Ongoing (interventions implemented as appropriate)    Goal: Interprofessional Rounds/Family Conf  Outcome: Ongoing (interventions implemented as appropriate)      Problem: Fall Risk (Adult)  Goal: Identify Related Risk Factors and Signs and Symptoms  Outcome: Ongoing (interventions implemented as appropriate)    Goal: Absence of Fall  Outcome: Ongoing (interventions implemented as appropriate)

## 2019-11-16 NOTE — PROGRESS NOTES
"DOS: 2019  NAME: Bird Gallegos   : 1945  PCP: Mariya Lopez MD  Chief Complaint   Patient presents with   • Hypertension   • Numbness     Stroke    Subjective: Left numbness is improved but not back to baseline.  No headache or shortness of air.  Pt seen in follow up today, however the problem is new to the examiner.      Objective:  Vital signs: /81 (BP Location: Right arm, Patient Position: Sitting)   Pulse 82   Temp 96 °F (35.6 °C) (Oral)   Resp 17   Ht 172.7 cm (68\")   Wt 127 kg (279 lb)   SpO2 96%   BMI 42.42 kg/m²    General appearance: Well developed, well nourished, well groomed, alert and cooperative. Obese.  HEENT: Normocephalic.   Neck and spine: Normal range of motion. Normal alignment. No mass or tenderness.    Cardiac: Regular rate and rhythm.   Peripheral Vasculature: Radial pulses are equal and symmetric.  Chest Exam: Clear to auscultation bilaterally, no wheezes, no rhonchi.  Extremities: Normal, no edema.   Skin: No rashes or birthmarks.     Higher integrative function: Oriented to time, place, person, intact recent and remote memory, attention span, concentration and language. Spontaneous speech, fund of vocabulary are normal.   CN II: Normal visual fields.   CN III IV VI: Extraocular movements are full without nystagmus. Pupils are equal, round, and reactive to light.   CN V: Decreased sensation left V1 through V3  CN VII: Facial movements are symmetric, no weakness.   CN VIII: Auditory acuity is normal.   CN IX & X: Symmetric palatal movement.   CN XI: Sternocleidomastoid and trapezius are normal. No weakness.   CN XII: The tongue is midline. No atrophy or fasciculations.   Motor: Normal muscle strength, bulk, and tone in upper and lower extremities. No fasciculations, rigidity, spasticity or abnormal movements.   Sensation: Decreased sensation in left hand and left leg.  Station and gait: N/A  Muscle stretch reflexes: Reflexes are 1+.  Plantar reflexes are " flexor bilaterally.   Coordination: Finger to nose test showed no dysmetria. Rapid alternating movements were normal. Heel to shin normal.     Scheduled Meds:  aspirin 81 mg Oral Daily   atorvastatin 40 mg Oral Nightly   carvedilol 25 mg Oral Q12H   clonazePAM 2 mg Oral Nightly   clopidogrel 75 mg Oral Daily   furosemide 40 mg Oral Daily   gabapentin 300 mg Oral TID   insulin glargine 15 Units Subcutaneous QAM   insulin lispro 0-9 Units Subcutaneous 4x Daily With Meals & Nightly   insulin lispro 5 Units Subcutaneous TID With Meals   lisinopril 10 mg Oral Q24H   potassium chloride 10 mEq Oral Daily   sertraline 50 mg Oral Daily   tamsulosin 0.4 mg Oral Nightly     Continuous Infusions:   PRN Meds:.•  acetaminophen **OR** acetaminophen  •  bisacodyl  •  dextrose  •  dextrose  •  glucagon (human recombinant)  •  labetalol  •  ondansetron    Laboratory results:  Lab Results   Component Value Date    GLUCOSE 143 (H) 11/15/2019    CALCIUM 8.8 11/15/2019     11/15/2019    K 3.7 11/15/2019    CO2 27.6 11/15/2019     11/15/2019    BUN 14 11/15/2019    CREATININE 0.94 11/15/2019    EGFRIFNONA 78 11/15/2019    BCR 14.9 11/15/2019    ANIONGAP 11.4 11/15/2019     Lab Results   Component Value Date    WBC 8.27 11/15/2019    HGB 14.3 11/15/2019    HCT 44.3 11/15/2019    MCV 85.7 11/15/2019     11/15/2019     Lab Results   Component Value Date    CHOL 130 11/15/2019    CHOL 127 03/25/2019     Lab Results   Component Value Date    HDL 27 (L) 11/15/2019    HDL 28 (L) 10/03/2019    HDL 25 (L) 05/30/2019     Lab Results   Component Value Date    LDL 60 11/15/2019    LDL 45 10/03/2019    LDL 53 05/30/2019     Lab Results   Component Value Date    TRIG 215 (H) 11/15/2019    TRIG 244 (H) 10/03/2019    TRIG 174 (H) 05/30/2019   EKG 11/14 tracings viewed by me, shows normal sinus rhythm.     Review and interpretation of imaging:  Study Impression     • Right ICA Prox: Imaging indicates 16%-49% stenosis.       • Left ICA  Prox: Imaging indicates 16-49% stenosis.     MRI OF THE BRAIN WITHOUT CONTRAST     CLINICAL HISTORY: Dizziness, left-sided numbness, blurred vision.  Headache.     TECHNIQUE: MRI of the brain was obtained with sagittal T1, axial T1,  axial FLAIR, axial T2, axial diffusion, and axial gradient echo images.     FINDINGS:     The ventricles, sulci, and cisterns are age appropriate. The midline  intracranial anatomy is unremarkable. There are no abnormal areas of  restricted diffusion. Mild changes of chronic small vessel ischemic  phenomena are noted. The major intracranial flow related signal voids  are within normal limits.     Postsurgical changes are incidentally noted within the visualized upper  cervical spine. Mild mucosal thickening is appreciated within the  ethmoid air cells. Also, incidental note is made of a trace right  mastoid effusion.     IMPRESSION:     There is no evidence for acute intracranial pathology. Mild changes of  chronic small vessel ischemic phenomena are noted.     Mild changes of inflammatory paranasal sinus disease are incidentally  noted. Incidental note is also made of a trace right mastoid effusion.     This report was finalized on 11/15/2019 1:11 PM by Dr. Max Sánchez M.D.     Echocardiogram Findings     Left Ventricle Left ventricular systolic function is normal. Calculated EF = 70%. Estimated EF was in agreement with the calculated EF. Normal left ventricular cavity size and wall thickness noted. All left ventricular wall segments contract normally. Left ventricular diastolic dysfunction is noted (grade I) consistent with impaired relaxation.   Right Ventricle Normal right ventricular cavity size, wall thickness, systolic function and septal motion noted.   Left Atrium Normal left atrial volume noted. Left atrial cavity size is moderately dilated. Saline test results are negative.   Right Atrium Right atrial cavity size is moderately dilated.   Aortic Valve There is a prosthetic  aortic valve. No aortic valve regurgitation is present. No aortic valve stenosis is present. There is a 23 mm bioprosthetic valve present. The prosthetic valve is normal. 23mm Magna tissue valve 3/26/19.   Mitral Valve The mitral valve is normal in structure. Trace-to-mild mitral valve regurgitation is present with a centrally-directed jet noted. No significant mitral valve stenosis is present.   Tricuspid Valve The tricuspid valve is normal. No evidence of tricuspid valve stenosis is present. No tricuspid valve regurgitation is present.   Pulmonic Valve The pulmonic valve is structurally normal. There is no significant pulmonic valve stenosis present. There is no pulmonic valve regurgitation present.   Pericardium The pericardium is normal. There is no evidence of pericardial effusion.       Impression:  Patient is a 74-year-old male with HTN, STEF, DM with complication of neuropathy, CAD status post CABG, VHD status post AVR, previous cervical fusion, and anxiety/depression admitted 11/14 with complaints of acute onset of left face arm and leg numbness elevated blood pressures with dizziness.  He had been seen by his oral surgeon earlier in the day for tooth extraction and was found to have elevated blood pressure of 200/120 in the office and was subsequently sent to the ED.  Patient was on aspirin 81 mg and Lipitor 10 mg prior to arrival.    Diagnosis: Clinical right thalamic stroke  CT head 11/14: Mild small vessel disease, no acute findings  Carotid ultrasound 11/14: Mild stenosis of 16 to 49%, in bilateral ICAs  MRI brain without 11/15: No acute findings  2D echo 11/15: EF 70%, normal left atrial volume, moderately dilated left atrial cavity size.  Saline test results are negative.  Labs: TSH 2.22, hemoglobin A1c 6.4%, LDL 45, B12 249    Plan:  Status post Plavix load, continue aspirin 81 mg and Plavix 75 mg for 30 days then switch to Plavix alone  Lipitor 40 mg x 30 days then decrease back to low-dose  ZIO  Patch to be placed at discharge  Borderline low B12, replace IM then p.o.  Neurochecks  BP control- normalized.  Goal less than 130/80  Stroke Education  IDANIA/SCDs  PT/OT/ST  D/W Dr Hargrove today.  No further neurologic work-up.  Patient to follow-up with me in clinic in 3 months.  Will sign off, please call further questions or concerns.

## 2019-11-17 ENCOUNTER — READMISSION MANAGEMENT (OUTPATIENT)
Dept: CALL CENTER | Facility: HOSPITAL | Age: 74
End: 2019-11-17

## 2019-11-17 NOTE — TELEPHONE ENCOUNTER
"This pt. Was discharged today and it was late 8PM there, daughter was very upset, his medication was sent to Pharmacy and it is closed and will miss a dose of his BP medication, Triage nurse went over his medication with her and he will just be late taking lisinopril, but has already had it for today,she will pick it up tomorrow when pharmacy opens, and lipitor, told her is ok if one dose is missed start tomorrow . Told her to call us if any more issues comes up. She was much better after AVS re explored with her.     Reason for Disposition  • Caller has medication question only, adult not sick, and triager answers question    Additional Information  • Negative: Drug overdose and nurse unable to answer question  • Negative: Caller requesting information not related to medicine  • Negative: Caller requesting a prescription for Strep throat and has a positive culture result  • Negative: Rash while taking a medication or within 3 days of stopping it  • Negative: Immunization reaction suspected  • Negative: [1] Asthma and [2] having symptoms of asthma (cough, wheezing, etc)  • Negative: MORE THAN A DOUBLE DOSE of a prescription or over-the-counter (OTC) drug  • Negative: [1] DOUBLE DOSE (an extra dose or lesser amount) of over-the-counter (OTC) drug AND [2] any symptoms (e.g., dizziness, nausea, pain, sleepiness)  • Negative: [1] DOUBLE DOSE (an extra dose or lesser amount) of prescription drug AND [2] any symptoms (e.g., dizziness, nausea, pain, sleepiness)  • Negative: Took another person's prescription drug  • Negative: [1] DOUBLE DOSE (an extra dose or lesser amount) of prescription drug AND [2] NO symptoms (Exception: a double dose of antibiotics)  • Negative: Diabetes drug error or overdose (e.g., insulin or extra dose)  • Negative: [1] Request for URGENT new prescription or refill of \"essential\" medication (i.e., likelihood of harm to patient if not taken) AND [2] triager unable to fill per unit policy  • " "Negative: [1] Prescription not at pharmacy AND [2] was prescribed today by PCP  • Negative: Pharmacy calling with prescription questions and triager unable to answer question  • Negative: Caller has URGENT medication question about med that PCP prescribed and triager unable to answer question  • Negative: Caller has NON-URGENT medication question about med that PCP prescribed and triager unable to answer question  • Negative: Caller requesting a NON-URGENT new prescription or refill and triager unable to refill per unit policy  • Negative: Caller has medication question about med not prescribed by PCP and triager unable to answer question (e.g., compatibility with other med, storage)  • Negative: [1] DOUBLE DOSE (an extra dose or lesser amount) of over-the-counter (OTC) drug AND [2] NO symptoms  • Negative: [1] DOUBLE DOSE (an extra dose or lesser amount) of antibiotic drug AND [2] NO symptoms  • Negative: Caller has medication question, adult has minor symptoms, caller declines triage, and triager answers question  • Negative: Caller requesting information about medication during pregnancy; adult is not ill and triager answers question  • Negative: Caller requesting information about medication use with breastfeeding; neither adult nor infant is ill, and triager answers question  • Negative: Caller requesting a refill, no triage required, and triager able to refill per unit policy    Answer Assessment - Initial Assessment Questions  1. SYMPTOMS: \"Do you have any symptoms?\"      no  2. SEVERITY: If symptoms are present, ask \"Are they mild, moderate or severe?\"      no    Protocols used: MEDICATION QUESTION CALL-ADULT-      "

## 2019-11-17 NOTE — OUTREACH NOTE
Prep Survey      Responses   Facility patient discharged from?  [facility]   Is patient eligible?  Yes   Discharge diagnosis  paresthesias, thalamic stroke, morbid obesity, diabetic neuropathy, NIDDM II, HTN, CAD, nonrheumatic aortic valve stenosis STEF on auto CPAP   Does the patient have one of the following disease processes/diagnoses(primary or secondary)?  Stroke (TIA)   Does the patient have Home health ordered?  No   Is there a DME ordered?  No   Comments regarding appointments  Pt to schedule follow up appointments   Prep survey completed?  Yes          Pascale Patino RN

## 2019-11-17 NOTE — NURSING NOTE
Report received on patient.  Discharge paperwork complete, LDA removed.  Awaiting ride.      Pt daughter arrived.  Accompanied pt to private vehicle via wheelchair for discharge home.  Belongings and instructions sent with patient.

## 2019-11-19 ENCOUNTER — READMISSION MANAGEMENT (OUTPATIENT)
Dept: CALL CENTER | Facility: HOSPITAL | Age: 74
End: 2019-11-19

## 2019-11-19 NOTE — OUTREACH NOTE
Stroke Week 1 Survey      Responses   Facility patient discharged from?  [facility]   Does the patient have one of the following disease processes/diagnoses(primary or secondary)?  Stroke (TIA)   Is there a successful TCM telephone encounter documented?  No   Week 1 attempt successful?  Yes   Call start time  1728   Call end time  1732   Discharge diagnosis  paresthesias, thalamic stroke, morbid obesity, diabetic neuropathy, NIDDM II, HTN, CAD, nonrheumatic aortic valve stenosis STEF on auto CPAP   Meds reviewed with patient/caregiver?  Yes   Is the patient having any side effects they believe may be caused by any medication additions or changes?  No   Does the patient have all medications ordered at discharge?  Yes   Prescription comments  got them the day after discharge   Is the patient taking all medications as directed (includes completed medication regime)?  Yes   Does the patient have a primary care provider?   Yes   Does the patient have an appointment with their PCP within 7 days of discharge?  Yes   Has the patient kept scheduled appointments due by today?  N/A   Has home health visited the patient within 72 hours of discharge?  N/A   Psychosocial issues?  No   Does the patient require any assistance with activities of daily living such as eating, bathing, dressing, walking, etc.?  No   Does the patient have any residual symptoms from stroke/TIA?  Yes   Residual symptoms comments  numbness in james/arm   Does the patient understand the diet ordered at discharge?  Yes   Did the patient receive a copy of their discharge instructions?  Yes   Nursing interventions  Reviewed instructions with patient   What is the patient's perception of their health status since discharge?  Same   Nursing interventions  Nurse provided patient education   Is the patient able to teach back FAST for Stroke?  Yes   Is the patient/caregiver able to teach back the risk factors for a stroke?  High blood pressure-goal below 120/80,  Diabetes, High Cholesterol, Physical inactivity and obesity, Carotid or other artery disease, History of TIAs, History of Afib, Smoking   Is the patient/caregiver able to teach back signs and symptoms related to disease process for when to call PCP?  Yes   Is the patient/caregiver able to teach back signs and symptoms related to disease process for when to call 911?  Yes   Is the patient/caregiver able to teach back the hierarchy of who to call/visit for symptoms/problems? PCP, Specialist, Home health nurse, Urgent Care, ED, 911  Yes   Week 1 call completed?  Yes          Olamide Tang RN

## 2019-11-26 ENCOUNTER — READMISSION MANAGEMENT (OUTPATIENT)
Dept: CALL CENTER | Facility: HOSPITAL | Age: 74
End: 2019-11-26

## 2019-11-26 NOTE — OUTREACH NOTE
Stroke Week 2 Survey      Responses   Facility patient discharged from?  [facility]   Does the patient have one of the following disease processes/diagnoses(primary or secondary)?  Stroke (TIA)   Week 2 attempt successful?  No   Unsuccessful attempts  Attempt 1          Dayana Araiza RN

## 2019-11-27 ENCOUNTER — READMISSION MANAGEMENT (OUTPATIENT)
Dept: CALL CENTER | Facility: HOSPITAL | Age: 74
End: 2019-11-27

## 2019-11-27 NOTE — OUTREACH NOTE
Stroke Week 2 Survey      Responses   Facility patient discharged from?  [facility]   Does the patient have one of the following disease processes/diagnoses(primary or secondary)?  Stroke (TIA)   Week 2 attempt successful?  Yes   Call start time  1320   Call end time  1326   Meds reviewed with patient/caregiver?  Yes   Is the patient taking all medications as directed (includes completed medication regime)?  Yes   Has the patient kept scheduled appointments due by today?  Yes   Comments  Has seen PCP, cardiology 12/3 Dave. Neurology coming up   Does the patient require any assistance with activities of daily living such as eating, bathing, dressing, walking, etc.?  No   ADL comments  cane if needed   Does the patient have any residual symptoms from stroke/TIA?  Yes   Residual symptoms comments  my left side is weaker, left cheek has some numbness residual   Does the patient understand the diet ordered at discharge?  Yes   What is the patient's perception of their health status since discharge?  Improving   Is the patient able to teach back FAST for Stroke?  Yes   Is the patient/caregiver able to teach back the risk factors for a stroke?  High blood pressure-goal below 120/80, Diabetes, High Cholesterol, Physical inactivity and obesity, Smoking, Carotid or other artery disease, History of TIAs, History of Afib, HIgh red blood cell count, Sleep apnea [bp is running 130-140's most of the time takes medications, told to keep log for his DrBrandon ]   Week 2 call completed?  Yes   Wrap up additional comments  He is doing "[employer] per his statement, wanting to know when to drive, told him to [facility] with his Donnie Murphy, RN

## 2019-12-02 ENCOUNTER — TELEPHONE (OUTPATIENT)
Dept: NEUROLOGY | Facility: CLINIC | Age: 74
End: 2019-12-02

## 2019-12-02 NOTE — TELEPHONE ENCOUNTER
Two Week Stroke Phone Call  Spoke with the patient  · Admission Date:      · Discharge Date: 11/16/2019  · Discharge Destination:  11/14/2019  · Meds reviewed with patient/caregiver?    [x]Yes [] No   o Antiplatelet: Plavix.  - Understands purpose     [x]  Yes     []  No     - Understands how to take    o   [x]  Yes     []  No    o Reminded pt  o Cholesterol Reducing: Lipitor  - Understands purpose     [x]  Yes     []  No    - Understands how to take      [x]  Yes     []  No   Currently taking 40mg, per neuro note, decrease in one month.  He states he was previously on 10mg  · Is the patient taking all medication as directed?   []  Yes  [x]  No  · Discussed personal risk factors   []  Yes []  No    o Physical Inactivity  - Engaged in physical activity [x]  Yes []  No   - Trying to walk outdoors when he can.  o Obesity or overweight  - Been on a calorie restricted diet? [x]  Yes []  No  - States he has stopped adding additional salt to his food.     o Smoking or other tobacco use  - Has attempted to quit smoking [x]  Yes     []  No    o High blood pressure   - Reviewed medications ordered for high blood pressure  - Has been monitoring BP [x]  Yes     []  No  - Bp goal <130/80   - States he's taking his BP every two hours.  Most recent 132/78.    o Diabetes   - Reviewed medications ordered for diabetes   o High cholesterol   - Review desired LDL goal <70  o Atherosclerosis  - Plaque inside the arteries, “hardening of the arteries”  o Atrial fibrillation   • Discussed signs and symptoms of stroke and when patient to call 911?   [x]  Yes []  No  o Sudden weakness or numbness of the face, arm, or leg especially on one side of the body  o Sudden confusion, trouble speaking or understanding  o Sudden trouble seeing in one or both eyes   o Sudden trouble walking, dizziness, loss of balance or coordination  o Sudden severe headaches with no known cause    Notified Patient that if any of these symptoms occur to call  911  · Does the patient have any new signs or symptoms of a stroke?   []  Yes     [x]  No  · Does the patient have an appointment with PCP?  [x]  Yes     []  No  · Does the patient have 3 month Stroke Clinic appointment?  [x]  Yes     []  No  · Is the patient currently in therapy, outpatient, or home health?  [x]  Yes     []  No    Needs a referral?      []  Yes     [x]  No   Does the patient have increasing stiffness in your arms, hands, or legs?    []  Yes     [x]  No   Is this interfering with activities of daily living?   []  Yes     [x]  No  Patient Satisfaction   · How would you rate your satisfaction with the instructions provided about your specific risk factors for stroke?   []Poor  [x] Fair    [] Good [] Very Good  [] Excellent   · How would you rate your satisfaction with the instructions provided on the warnings signs and symptoms of stroke?   []Poor  [x] Fair   [] Good [] Very Good  [] Excellent   · How well did we explain the importance of calling 911 to activate the emergency medical system for new signs and symptoms of stroke?    []Poor  [x] Fair   [] Good [] Very Good  [] Excellent   · Would you recommend the stroke center to your friends and family?   []Definitely Would Not  [] Probably Would Not  [] Neutral   []  Probably Would [x] Definitely Would

## 2019-12-03 ENCOUNTER — TELEPHONE (OUTPATIENT)
Dept: NEUROLOGY | Facility: CLINIC | Age: 74
End: 2019-12-03

## 2019-12-03 ENCOUNTER — OFFICE VISIT (OUTPATIENT)
Dept: CARDIOLOGY | Facility: CLINIC | Age: 74
End: 2019-12-03

## 2019-12-03 VITALS
HEART RATE: 77 BPM | WEIGHT: 275 LBS | SYSTOLIC BLOOD PRESSURE: 144 MMHG | HEIGHT: 68 IN | BODY MASS INDEX: 41.68 KG/M2 | DIASTOLIC BLOOD PRESSURE: 92 MMHG

## 2019-12-03 DIAGNOSIS — I25.10 CORONARY ARTERY DISEASE INVOLVING NATIVE CORONARY ARTERY OF NATIVE HEART WITHOUT ANGINA PECTORIS: ICD-10-CM

## 2019-12-03 DIAGNOSIS — I63.9 CEREBROVASCULAR ACCIDENT (CVA), UNSPECIFIED MECHANISM (HCC): ICD-10-CM

## 2019-12-03 DIAGNOSIS — I35.0 AORTIC VALVE STENOSIS, ETIOLOGY OF CARDIAC VALVE DISEASE UNSPECIFIED: Primary | ICD-10-CM

## 2019-12-03 PROCEDURE — 93000 ELECTROCARDIOGRAM COMPLETE: CPT | Performed by: INTERNAL MEDICINE

## 2019-12-03 PROCEDURE — 99213 OFFICE O/P EST LOW 20 MIN: CPT | Performed by: INTERNAL MEDICINE

## 2019-12-03 RX ORDER — ATORVASTATIN CALCIUM 40 MG/1
40 TABLET, FILM COATED ORAL DAILY
COMMUNITY
Start: 2019-11-20

## 2019-12-03 RX ORDER — ATORVASTATIN CALCIUM 10 MG/1
10 TABLET, FILM COATED ORAL DAILY
Qty: 30 TABLET | Refills: 3 | Status: SHIPPED | OUTPATIENT
Start: 2019-12-03 | End: 2019-12-03 | Stop reason: DRUGHIGH

## 2019-12-03 NOTE — PROGRESS NOTES
Subjective:     Encounter Date: 12/03/19      Patient ID: Bird Gallegos is a 74 y.o. male.    Chief Complaint: CAD, AVR, CVA    This is a 74-year-old man with past medical history of CAD, AS s/p bioprothetic AVR obstructive sleep apnea, diabetes on oral medications, hypertension.      I originally met him in March 2019 around the time his  primary care physician noted a significant murmur.   He had an echocardiogram at [place] which showed severe aortic stenosis with a mean gradient of 45 mmHg and a peak velocity of 4.5 m/s squared, with a calculated KELSEA of 0.51 cm².  He underwent left heart catheterization as an outpatient which showed single-vessel coronary disease of the OM 2.  Given coronary disease and severe aortic stenosis, the surgeons performed traditional surgical AVR with single-vessel vein graft bypass to the OM.      He did quite well until November 2019.  At that time he was diagnosed with a thalamic stroke after presenting to [facility] with severe hypertension noted at the dentist (blood pressure of 200/120), weakness, fatigue and dyspnea, left-sided upper extremity paresthesias and weakness.  He was evaluated by the neurology team who diagnosed a thalamic stroke.  He left the hospital with a 2-week ZIO patch, which he returned a few days ago.  He says today that the upper extremity weakness has improved though he has no real sensation or feeling in the left hand.  His breathing has resolved.  He is being very diligent about taking his blood pressures daily.  His pressures have been ranging no higher than 140-145 systolic.  He has mild lower extremity edema.     HE walks and is trying to cut back on [employer] but hasnt lost weight.    He is a former .  He quit smoking 30 years ago.  He is a recent .  His daughter is a former nurse.     REVIEW OF SYSTEMS:   All systems reviewed.  Pertinent positives identified in HPI.  All other systems are negative.    The following  portions of the patient's history were reviewed and updated as appropriate: allergies, current medications, past family history, past medical history, past social history, past surgical history and problem list.    Past Medical History:   Diagnosis Date   • Anxiety and depression    • Aortic stenosis    • Arthritis    • Coronary artery disease    • Diabetes mellitus (CMS/HCC)    • Diabetic neuropathy (CMS/HCC)    • Diverticulosis    • COLVIN (dyspnea on exertion)    • GERD (gastroesophageal reflux disease)    • Heart murmur    • History of cellulitis     LEFT LEG   • Hypertension    • STEF (obstructive sleep apnea)    • STEF on auto CPAP        Family History   Problem Relation Age of Onset   • Heart disease Mother    • Cancer Father    • Hypertension Brother    • Hypertension Brother    • Malig Hyperthermia Neg Hx        Social History     Socioeconomic History   • Marital status:      Spouse name: Not on file   • Number of children: Not on file   • Years of education: Not on file   • Highest education level: Not on file   Tobacco Use   • Smoking status: Former Smoker     Packs/day: 0.50     Types: Cigarettes     Last attempt to quit:      Years since quittin.9   • Smokeless tobacco: Never Used   • Tobacco comment: Smoked between the ages of 25 and 40.   Substance and Sexual Activity   • Alcohol use: No   • Drug use: No   • Sexual activity: Defer       Allergies   Allergen Reactions   • Contrast Dye Hives     Hives and rash reported       Past Surgical History:   Procedure Laterality Date   • BACK SURGERY      X2   • CARDIAC CATHETERIZATION N/A 3/21/2019    Procedure: Coronary angiography;  Surgeon: Wendy Braxton MD;  Location: [facility] INVASIVE LOCATION;  Service: Cardiology   • CORONARY ARTERY BYPASS GRAFT WITH AORTIC VALVE REPAIR/REPLACEMENT N/A 3/26/2019    Procedure: INTRAOP QUIQUE; CORONARY ARTERY BYPASS X 1 UTILIZING ENDOSCOPICALLY HARVESTED RIGHT GREATER SAPHENOUS VEIN; AORTIC VALVE REPLACEMENT  AND PRP.;  Surgeon: Alexx Gandara MD;  Location: [facility] OR;  Service: Cardiothoracic   • POSTERIOR CERVICAL FUSION     • TENDON REPAIR Left     KNEE   • WRIST SURGERY Bilateral              ECG 12 Lead  Date/Time: 12/3/2019 12:14 PM  Performed by: Wendy Braxton MD  Authorized by: Wendy Braxton MD   Comparison: compared with previous ECG from 11/14/2019  Similar to previous ECG  Rhythm: sinus rhythm  Rate: normal  Conduction: conduction normal  ST Segments: ST segments normal  T Waves: T waves normal  QRS axis: left  Other findings: left atrial abnormality    Clinical impression: abnormal EKG               Objective:     Physical Exam  GEN: no distress, alert and oriented  Eyes: normal sclera, normal lids and lashes  HENT: moist mucus membranes,   Lungs CTAB, no rales or wheezes  Chest: no abnormalities  Neck: Too large to evaluate JVD.  CV: RRR, left ear murmur has resolved  Abdo: Obese soft,  Nontender, nondistended  Extr: +1 bl edema  Skin: no rash, warm, dry  Heme/Lymph: no bruising  Psych: organized thought, normal behavior and affect  Neuro: decreased sensation LUE/hand, adequate 4/4 strength in     Outpatient Encounter Medications as of 12/3/2019   Medication Sig Dispense Refill   • ascorbic acid (VITAMIN C) 1000 MG tablet Take 1,000 mg by mouth Daily.     • atorvastatin (LIPITOR) 40 MG tablet Take 40 mg by mouth Daily.     • BABY ASPIRIN PO Take 81 mg by mouth Daily.     • carvedilol (COREG) 25 MG tablet Take 1 tablet by mouth Every 12 (Twelve) Hours. 60 tablet 2   • clonazePAM (KlonoPIN) 1 MG disintegrating tablet Take 2 mg by mouth Every Night.     • clopidogrel (PLAVIX) 75 MG tablet Take 1 tablet by mouth Daily for 30 days. 30 tablet 0   • furosemide (LASIX) 40 MG tablet Take 40 mg by mouth Daily.     • gabapentin (NEURONTIN) 300 MG capsule Take 300 mg by mouth 3 (Three) Times a Day.     • glimepiride (AMARYL) 2 MG tablet Take 2 mg by mouth Every Morning Before Breakfast.     • lisinopril  (PRINIVIL,ZESTRIL) 10 MG tablet Take 1 tablet by mouth Daily for 30 days. 30 tablet 0   • loratadine (CLARITIN) 10 MG tablet Take 10 mg by mouth Daily.     • metFORMIN (GLUCOPHAGE) 1000 MG tablet Take 1,000 mg by mouth 2 (Two) Times a Day With Meals.     • potassium chloride (K-DUR,KLOR-CON) 10 MEQ CR tablet Take 10 mEq by mouth Daily.     • sertraline (ZOLOFT) 50 MG tablet Take 50 mg by mouth Daily.     • tamsulosin (FLOMAX) 0.4 MG capsule 24 hr capsule Take 1 capsule by mouth Every Night.     • vitamin B-12 (VITAMIN B-12) 1000 MCG tablet Take 1 tablet by mouth Daily. 30 tablet 0   • [DISCONTINUED] atorvastatin (LIPITOR) 10 MG tablet Take 1 tablet by mouth Daily. 30 tablet 3   • [DISCONTINUED] atorvastatin (LIPITOR) 40 MG tablet Take 1 tablet by mouth Every Night for 30 days. 30 tablet 0     No facility-administered encounter medications on file as of 12/3/2019.              Assessment:          Diagnosis Plan   1. Aortic valve stenosis, etiology of cardiac valve disease unspecified     2. Coronary artery disease involving native coronary artery of native heart without angina pectoris     3. Cerebrovascular accident (CVA), unspecified mechanism (CMS/HCC)                Plan:         This is a 73-year-old man with prior severe AS and singlevessel CAD, s/p  SAVR with SVG to OM2     1. Aortic Stenosis and CAD  - bioprosthetic AVR with SVG to OM Dr. Gandara  - ASA,  Atorva 40, Coreg 25mg daily.     2. CVA: recent thalamic stroke with residual LUE paresthesias/ sensory deficit. ZIO patch result is pending. Likely related to severe hypertension. Will continue atorvastatin 40mg indefinitely, ASA, Plavix.   3. HTN- controlled on current meds. Daily measurement at home.   4. HLD- on atorva, increased to 40 appropriately.   5. DM- metformin and glimeperide  6. Diastolic dysfunction: mild LE edema, improved. Lasix 40 daily.   7. Mild bl carotid artery disease    Dr. Lopez and Dr. Cano, thank you for referring this kind  patient to me.  He did very well postoperatively and continues to do well today.  I will see him back in 3 months in the office.     Wendy Braxton MD  12/03/19

## 2019-12-03 NOTE — TELEPHONE ENCOUNTER
Discussed with pt.    ----- Message from KEVIN Cassidy sent at 12/3/2019  9:15 AM EST -----  From my standpoint he can drive. I will send refill for lipitor 10 mg. Please let him know. Thanks!  ----- Message -----  From: Viviane Rodrigues RN  Sent: 12/2/2019  12:45 PM  To: KEVIN Cassidy You recently saw this pt in the hospital and recommended Lipitor 40mg x 30 days then decrease back to low-dose.  He was on 10mg prior to admission.  He states that he will need a rx for the 10mg sent to his pharmacy.    He also is wanting to know if it is ok for him to drive.  He states that he does have some residual left sided weakness but that it is improving.  He denies any new stroke like signs or symptoms.

## 2019-12-04 ENCOUNTER — READMISSION MANAGEMENT (OUTPATIENT)
Dept: CALL CENTER | Facility: HOSPITAL | Age: 74
End: 2019-12-04

## 2019-12-04 NOTE — OUTREACH NOTE
Stroke Week 3 Survey      Responses   Facility patient discharged from?  [place]   Does the patient have one of the following disease processes/diagnoses(primary or secondary)?  Stroke (TIA)   Week 3 attempt successful?  Yes   Call start time  1411   Call end time  1418   Discharge diagnosis  paresthesias, thalamic stroke, morbid obesity, diabetic neuropathy, NIDDM II, HTN, CAD, nonrheumatic aortic valve stenosis STEF on auto CPAP   Meds reviewed with patient/caregiver?  Yes   Is the patient having any side effects they believe may be caused by any medication additions or changes?  No   Does the patient have all medications ordered at discharge?  Yes   Is the patient taking all medications as directed (includes completed medication regime)?  Yes   Medication comments  continue lipitor 40 mg daily   Does the patient have a primary care provider?   Yes   Does the patient have an appointment with their PCP within 7 days of discharge?  Yes   Comments regarding PCP  Dr. Lopez   Has the patient kept scheduled appointments due by today?  Yes   Comments  saw cardiology 12/3   Has home health visited the patient within 72 hours of discharge?  N/A   Psychosocial issues?  No   Does the patient require any assistance with activities of daily living such as eating, bathing, dressing, walking, etc.?  No   Does the patient have any residual symptoms from stroke/TIA?  Yes   Residual symptoms comments  still has some left hand numbness but does have strength   Does the patient understand the diet ordered at discharge?  Yes   Did the patient receive a copy of their discharge instructions?  Yes   Nursing interventions  Reviewed instructions with patient   What is the patient's perception of their health status since discharge?  Improving   Nursing interventions  Nurse provided patient education   Is the patient able to teach back FAST for Stroke?  Yes   Is the patient/caregiver able to teach back the risk factors for a stroke?   High blood pressure-goal below 120/80, Diabetes, High Cholesterol, Physical inactivity and obesity, Smoking   Is the patient/caregiver able to teach back signs and symptoms related to disease process for when to call PCP?  Yes   Is the patient/caregiver able to teach back signs and symptoms related to disease process for when to call 911?  Yes   If the patient is a current smoker, are they able to teach back resources for cessation?  -- [non-smoker]   Is the patient/caregiver able to teach back the hierarchy of who to call/visit for symptoms/problems? PCP, Specialist, Home health nurse, Urgent Care, ED, 911  Yes   Week 3 call completed?  Yes          Olamide Tang RN

## 2019-12-09 PROCEDURE — 0298T HOLTER MONITOR - 72 HOUR UP TO 21 DAY: CPT | Performed by: INTERNAL MEDICINE

## 2019-12-11 ENCOUNTER — READMISSION MANAGEMENT (OUTPATIENT)
Dept: CALL CENTER | Facility: HOSPITAL | Age: 74
End: 2019-12-11

## 2019-12-11 NOTE — OUTREACH NOTE
Stroke Week 4 Survey      Responses   Facility patient discharged from?  [place]   Does the patient have one of the following disease processes/diagnoses(primary or secondary)?  Stroke (TIA)   Week 4 attempt successful?  Yes   Call start time  1027   Call end time  1029   Discharge diagnosis  paresthesias, thalamic stroke, morbid obesity, diabetic neuropathy, NIDDM II, HTN, CAD, nonrheumatic aortic valve stenosis STEF on auto CPAP   Meds reviewed with patient/caregiver?  Yes   Is the patient having any side effects they believe may be caused by any medication additions or changes?  No   Is the patient taking all medications as directed (includes completed medication regime)?  Yes   Has the patient kept scheduled appointments due by today?  Yes   Does the patient require any assistance with activities of daily living such as eating, bathing, dressing, walking, etc.?  No   Does the patient have any residual symptoms from stroke/TIA?  Yes   Residual symptoms comments  left side weakness still   Does the patient understand the diet ordered at discharge?  Yes   What is the patient's perception of their health status since discharge?  Improving   Nursing interventions  Nurse provided patient education   Is the patient able to teach back FAST for Stroke?  Yes   Is the patient/caregiver able to teach back the risk factors for a stroke?  High blood pressure-goal below 120/80, Diabetes, High Cholesterol, Physical inactivity and obesity, Smoking   Is the patient/caregiver able to teach back signs and symptoms related to disease process for when to call PCP?  Yes   Is the patient/caregiver able to teach back signs and symptoms related to disease process for when to call 911?  Yes   Is the patient/caregiver able to teach back the hierarchy of who to call/visit for symptoms/problems? PCP, Specialist, Home health nurse, Urgent Care, ED, 911  Yes   Week 4 Call Completed?  Yes   Would the patient like one additional call?  Yes           Dayana Araiza, RN

## 2020-01-06 NOTE — PROGRESS NOTES
DOS: 2020  NAME: Bird Gallegos   : 1945  PCP: Mariya Lopez MD      Chief Complaint: Stroke follow-up    Neurological Problem and Interval History:  74 y.o. RH  male with hypertension, STEF compliant with CPAP, diabetes with complication of neuropathy, CAD status post CABG, VHD status post AVR, previous cervical fusion, and anxiety/depression is being seen in follow-up today for clinical right thalamic stroke.  Patient was admitted to [facility] in 2019 with complaints of acute onset left face arm, and leg numbness along with elevated blood pressure and dizziness.  He had been seen by his oral surgeon earlier in the day for tooth extraction was found to have elevated blood pressure of 200/120 in the office and was subsequently sent to the ED.  The patient was on aspirin 81 mg and Lipitor 10 mg prior to his stroke.  MRI brain without contrast showed no acute findings and a carotid ultrasound showed mild stenosis of 16-49 percent in bilateral ICAs.  2D echo showed EF of 70%, normal left atrial volume, and moderately dilated left atrial cavity. Saline test results were negative.  Hemoglobin A1c was 6.4% and LDL was 45.  B12 level was 249 and this was replaced IM then p.o.  We recommended increasing Lipitor to 40 mg for 30 days and then decreasing back to low-dose.  We loaded him with Plavix and continued DAPT x30 days then recommended Plavix alone.ZIO Patch showed rare PACs and PVCs and 4 episodes of SVT no atrial fibrillation.    · Patient continues to have numbness in his left hand following the stroke.  He denies new TIA or stroke symptoms.  His numbness and neuropathic pain in his feet secondary to diabetic neuropathy.  He takes gabapentin 300 mg 3 times daily but states he typically only takes it about twice a day.  · Patient notes intermittent left posterior neck pain with associated mild headache.  He feels it may be associated with lifting weights overhead.  It resolves on its own  without medication.  · He checks his blood pressure multiple times a day at home and it typically runs 130s over 70s.  · He states he is joining the [employer] today.  He has a neighbor who works out there 3 times a week and he plans on working out with his neighbor.    Patient's mother  of heart failure at age 93.  His father  at age 72 of lung cancer.  He has a sister with primary brain cancer.  Patient is  and lives alone but has children living nearby.  Retired .    Review of Systems:        Review of Systems   Constitutional: Positive for activity change, appetite change and chills. Negative for unexpected weight change.   HENT: Positive for dental problem, ear discharge, facial swelling, hearing loss, rhinorrhea, sinus pressure, sneezing and tinnitus. Negative for sinus pain, trouble swallowing and voice change.    Eyes: Positive for photophobia, discharge, redness, itching and visual disturbance. Negative for pain.   Respiratory: Positive for apnea. Negative for chest tightness, shortness of breath and wheezing.    Cardiovascular: Positive for leg swelling. Negative for chest pain and palpitations.   Gastrointestinal: Negative for abdominal pain, nausea and vomiting.   Endocrine: Negative for polydipsia and polyphagia.   Musculoskeletal: Positive for arthralgias, back pain, myalgias, neck pain and neck stiffness. Negative for gait problem and joint swelling.   Neurological: Positive for weakness, numbness and headaches. Negative for dizziness, tremors, seizures, syncope, facial asymmetry, speech difficulty and light-headedness.   Hematological: Does not bruise/bleed easily.   Psychiatric/Behavioral: Negative for agitation, behavioral problems, confusion, decreased concentration, dysphoric mood, hallucinations, self-injury, sleep disturbance and suicidal ideas. The patient is not nervous/anxious and is not hyperactive.    ROS completed by MA reviewed by me and I agree.      Current  Outpatient Medications:   •  ascorbic acid (VITAMIN C) 1000 MG tablet, Take 1,000 mg by mouth Daily., Disp: , Rfl:   •  atorvastatin (LIPITOR) 40 MG tablet, Take 40 mg by mouth Daily., Disp: , Rfl:   •  BABY ASPIRIN PO, Take 81 mg by mouth Daily., Disp: , Rfl:   •  carvedilol (COREG) 25 MG tablet, Take 1 tablet by mouth Every 12 (Twelve) Hours., Disp: 60 tablet, Rfl: 2  •  clonazePAM (KlonoPIN) 1 MG disintegrating tablet, Take 2 mg by mouth Every Night., Disp: , Rfl:   •  furosemide (LASIX) 40 MG tablet, Take 40 mg by mouth Daily., Disp: , Rfl:   •  gabapentin (NEURONTIN) 300 MG capsule, Take 300 mg by mouth 3 (Three) Times a Day., Disp: , Rfl:   •  glimepiride (AMARYL) 2 MG tablet, Take 2 mg by mouth Every Morning Before Breakfast., Disp: , Rfl:   •  loratadine (CLARITIN) 10 MG tablet, Take 10 mg by mouth Daily., Disp: , Rfl:   •  metFORMIN (GLUCOPHAGE) 1000 MG tablet, Take 1,000 mg by mouth 2 (Two) Times a Day With Meals., Disp: , Rfl:   •  potassium chloride (K-DUR,KLOR-CON) 10 MEQ CR tablet, Take 10 mEq by mouth Daily., Disp: , Rfl:   •  sertraline (ZOLOFT) 50 MG tablet, Take 50 mg by mouth Daily., Disp: , Rfl:   •  tamsulosin (FLOMAX) 0.4 MG capsule 24 hr capsule, Take 1 capsule by mouth Every Night., Disp: , Rfl:   •  vitamin B-12 (VITAMIN B-12) 1000 MCG tablet, Take 1 tablet by mouth Daily., Disp: 30 tablet, Rfl: 0      Laboratory Results:             Lab Results   Component Value Date    HGBA1C 6.50 (H) 11/15/2019         Lab Results   Component Value Date    CHOL 130 11/15/2019    CHOL 127 03/25/2019         Lab Results   Component Value Date    HDL 27 (L) 11/15/2019    HDL 28 (L) 10/03/2019    HDL 25 (L) 05/30/2019         Lab Results   Component Value Date    LDL 60 11/15/2019    LDL 45 10/03/2019    LDL 53 05/30/2019         Lab Results   Component Value Date    TRIG 215 (H) 11/15/2019    TRIG 244 (H) 10/03/2019    TRIG 174 (H) 05/30/2019     No results found for: RPR  Lab Results   Component Value Date     TSH 3.390 11/15/2019     Lab Results   Component Value Date    GLJWTGTS71 249 11/15/2019       Physical Examination: PHQ-9 score 6.  Patient does not find it difficult at all to  take care of things at home's or get along with other people.  He does not last for PBA score was 8  General Appearance:   Well developed, well nourished, well groomed, alert, and cooperative. Obese.  HEENT: Normocephalic.   Neck and Spine: Normal range of motion.  Normal alignment. No mass or tenderness.  Cardiac: Regular rate and rhythm.  Peripheral Vasculature: Radial pulses are equal and symmetric. No  signs of distal embolization.  Extremities:    Trace BLE edema.  Skin:    No rashes or birth marks.    Neurological examination:  Higher Integrative  Function: Oriented to time, place and person. Normal registration, recall, attention span and concentration. Normal language including comprehension, spontaneous speech, repetition, naming and vocabulary. No neglect. Normal fund of knowledge and higher integrative function.  CN II: Pupils are equal, round, and reactive to light. Normal visual fields.    CN III IV VI: Extraocular movements are full without nystagmus.   CN V: Decreased LT left face.  CN VII: Facial movements are symmetric. No weakness.  CN VIII:   Auditory acuity is intact to finger rub bilaterally.  CN IX & X:   Symmetric palatal movement.  CN XI: Sternocleidomastoid and trapezius are normal.  No weakness.  CN XII:   The tongue is midline.  No atrophy or fasciculations.  Motor: Normal muscle strength, bulk and tone in upper and lower extremities.  No fasciculations, rigidity, spasticity, or abnormal movements.  Reflexes: 1+ in uppers, difficult to elicit in lowers.   Sensation: Decreased LT in left hand involving all fingers. Decreased pinprick to midshin bilaterally.  Station and Gait: Mildly wide-based. Able to walk on toes but not heels or tandem.  Coordination: Finger to nose test shows no dysmetria.  Rapid alternating  movements are normal.  Heel to shin normal.    Diagnoses / Discussion:  Mr Gallegos was seen for clinical R thalamic stroke, felt secondary to uncontrolled HTN.    Plan:   Continue plavix 75 mg daily and lipitor 40 mg daily   Recommend weight loss through healthy diet and exercise, patient joining [facility]   Blood pressure control to <130/80, recommend bringing home cuff to next appointment for comparison.   Goal hgb a1c < 6.5%   Goal LDL <70-recommend high dose statins-    Serum glucose < 140   Call 911 for stroke any stroke symptoms   Follow-up in 1 year or sooner if needed.

## 2020-01-14 ENCOUNTER — OFFICE VISIT (OUTPATIENT)
Dept: NEUROLOGY | Facility: CLINIC | Age: 75
End: 2020-01-14

## 2020-01-14 VITALS
OXYGEN SATURATION: 98 % | SYSTOLIC BLOOD PRESSURE: 150 MMHG | DIASTOLIC BLOOD PRESSURE: 78 MMHG | WEIGHT: 285 LBS | HEIGHT: 68 IN | BODY MASS INDEX: 43.19 KG/M2 | HEART RATE: 71 BPM

## 2020-01-14 DIAGNOSIS — E11.42 DIABETIC POLYNEUROPATHY ASSOCIATED WITH TYPE 2 DIABETES MELLITUS (HCC): ICD-10-CM

## 2020-01-14 DIAGNOSIS — I63.81 THALAMIC STROKE (HCC): Primary | ICD-10-CM

## 2020-01-14 DIAGNOSIS — E66.01 MORBID OBESITY WITH BMI OF 40.0-44.9, ADULT (HCC): ICD-10-CM

## 2020-01-14 PROCEDURE — 99214 OFFICE O/P EST MOD 30 MIN: CPT | Performed by: NURSE PRACTITIONER

## 2020-01-14 RX ORDER — LISINOPRIL 10 MG/1
10 TABLET ORAL DAILY
COMMUNITY
End: 2021-12-14

## 2020-01-14 RX ORDER — CLOPIDOGREL BISULFATE 75 MG/1
75 TABLET ORAL DAILY
COMMUNITY

## 2020-01-14 NOTE — PATIENT INSTRUCTIONS
Continue to check BP at home, goal less than 130/80, bring you home cuff to next appointment for comparison  Ok to take fish oil

## 2020-02-26 ENCOUNTER — OFFICE VISIT (OUTPATIENT)
Dept: CARDIOLOGY | Facility: CLINIC | Age: 75
End: 2020-02-26

## 2020-02-26 VITALS
SYSTOLIC BLOOD PRESSURE: 124 MMHG | BODY MASS INDEX: 41.22 KG/M2 | HEIGHT: 68 IN | HEART RATE: 73 BPM | WEIGHT: 272 LBS | DIASTOLIC BLOOD PRESSURE: 78 MMHG

## 2020-02-26 DIAGNOSIS — I25.10 CORONARY ARTERY DISEASE INVOLVING NATIVE CORONARY ARTERY OF NATIVE HEART WITHOUT ANGINA PECTORIS: ICD-10-CM

## 2020-02-26 DIAGNOSIS — E78.5 HYPERLIPIDEMIA, UNSPECIFIED HYPERLIPIDEMIA TYPE: ICD-10-CM

## 2020-02-26 DIAGNOSIS — I63.9 CEREBROVASCULAR ACCIDENT (CVA), UNSPECIFIED MECHANISM (HCC): ICD-10-CM

## 2020-02-26 DIAGNOSIS — I10 ESSENTIAL HYPERTENSION: ICD-10-CM

## 2020-02-26 DIAGNOSIS — I35.0 NONRHEUMATIC AORTIC VALVE STENOSIS: Primary | ICD-10-CM

## 2020-02-26 PROCEDURE — 99214 OFFICE O/P EST MOD 30 MIN: CPT | Performed by: INTERNAL MEDICINE

## 2020-02-26 PROCEDURE — 93000 ELECTROCARDIOGRAM COMPLETE: CPT | Performed by: INTERNAL MEDICINE

## 2020-02-26 RX ORDER — FUROSEMIDE 40 MG/1
80 TABLET ORAL DAILY
Qty: 90 TABLET | Refills: 3 | Status: SHIPPED | OUTPATIENT
Start: 2020-02-26

## 2020-02-26 NOTE — PROGRESS NOTES
Subjective:     Encounter Date: 02/26/20      Patient ID: Bird Gallegos is a 74 y.o. male.    Chief Complaint: CAD, AVR, CVA    This is a 74-year-old man with past medical history of CAD, AS s/p bioprothetic AVR obstructive sleep apnea, diabetes on oral medications, hypertension.      I originally met him in March 2019 around the time his  primary care physician noted a significant murmur.   He had an echocardiogram at [facility] which showed severe aortic stenosis with a mean gradient of 45 mmHg and a peak velocity of 4.5 m/s squared, with a calculated KELSEA of 0.51 cm².  He underwent left heart catheterization as an outpatient which showed single-vessel coronary disease of the OM 2.  Given coronary disease and severe aortic stenosis, the surgeons performed traditional surgical AVR with single-vessel vein graft bypass to the OM.      He did quite well until November 2019 when he was diagnosed with a thalamic stroke due to severe hypertension. He was treated with DAPT x 30 days and is now on Plavix. His ZIO patch showed no atrial arrhythmias.     Today he returns.  He has no cardiac complaints.  He is worried that the Plavix is making him bruise, however he has had no lexie GI bleeding.  He is also had no falls.  His blood pressures are well controlled in the 120s to 130s range.  He feels his legs are swollen and they cannot carry him as far.  He does not have actual claudication but does describe weakness in the left lower leg.  He is recently joined the [employer] and plans to do some water walking to help lose some weight.    He is a former .  He quit smoking 30 years ago.  He is a recent .  His daughter is a former nurse.     REVIEW OF SYSTEMS:   All systems reviewed.  Pertinent positives identified in HPI.  All other systems are negative.    The following portions of the patient's history were reviewed and updated as appropriate: allergies, current medications, past family history, past medical  history, past social history, past surgical history and problem list.    Past Medical History:   Diagnosis Date   • Anxiety and depression    • Aortic stenosis    • Arthritis    • Coronary artery disease    • CTS (carpal tunnel syndrome)    • Degenerative joint disease (DJD) of lumbar spine    • Diabetes mellitus (CMS/HCC)    • Diabetic neuropathy (CMS/HCC)    • Difficulty walking    • Diverticulosis    • COLVIN (dyspnea on exertion)    • GERD (gastroesophageal reflux disease)    • Headache, tension-type    • Heart murmur    • History of cellulitis     LEFT LEG   • HL (hearing loss)    • Hypertension    • STEF (obstructive sleep apnea)    • STEF on auto CPAP    • Stroke (CMS/HCC)    • Vision loss        Family History   Problem Relation Age of Onset   • Heart disease Mother    • Arthritis Mother    • Cancer Father    • Hypertension Brother    • Diabetes Brother    • Hypertension Brother    • Parkinsonism Sister    • Arthritis Sister    • Heart disease Maternal Grandmother    • Heart disease Maternal Grandfather    • Heart disease Paternal Grandmother    • Malig Hyperthermia Neg Hx        Social History     Socioeconomic History   • Marital status:      Spouse name: Not on file   • Number of children: Not on file   • Years of education: Not on file   • Highest education level: Not on file   Tobacco Use   • Smoking status: Former Smoker     Packs/day: 0.50     Types: Cigarettes     Last attempt to quit: 1983     Years since quittin.1   • Smokeless tobacco: Never Used   • Tobacco comment: Smoked between the ages of 25 and 40.   Substance and Sexual Activity   • Alcohol use: No   • Drug use: No   • Sexual activity: Defer       Allergies   Allergen Reactions   • Contrast Dye Hives     Hives and rash reported       Past Surgical History:   Procedure Laterality Date   • BACK SURGERY      X2   • CARDIAC CATHETERIZATION N/A 3/21/2019    Procedure: Coronary angiography;  Surgeon: Wendy Braxton MD;  Location: [facility]  CATH INVASIVE LOCATION;  Service: Cardiology   • CORONARY ARTERY BYPASS GRAFT WITH AORTIC VALVE REPAIR/REPLACEMENT N/A 3/26/2019    Procedure: INTRAOP QUIQUE; CORONARY ARTERY BYPASS X 1 UTILIZING ENDOSCOPICALLY HARVESTED RIGHT GREATER SAPHENOUS VEIN; AORTIC VALVE REPLACEMENT AND PRP.;  Surgeon: Alexx Gandara MD;  Location: [facility] MAIN OR;  Service: Cardiothoracic   • POSTERIOR CERVICAL FUSION     • TENDON REPAIR Left     KNEE   • WRIST SURGERY Bilateral          ECG 12 Lead  Date/Time: 2/26/2020 10:18 AM  Performed by: Wendy Braxton MD  Authorized by: Wendy Braxton MD   Comparison: compared with previous ECG from 12/3/2019  Similar to previous ECG  Rhythm: sinus rhythm  Rate: normal  Conduction: incomplete right bundle branch block  ST Segments: ST segments normal  T Waves: T waves normal  QRS axis: normal  Other: no other findings    Clinical impression: normal ECG               Objective:     Physical Exam  GEN: no distress, alert and oriented  Eyes: normal sclera, normal lids and lashes  HENT: moist mucus membranes,   Lungs CTAB, no rales or wheezes  Chest: no abnormalities  Neck: Too large to evaluate JVD.  CV: RRR, left ear murmur has resolved  Abdo: Obese soft,  Nontender, nondistended  Extr: +1 bl edema  Skin: no rash, warm, dry  Heme/Lymph: no bruising  Psych: organized thought, normal behavior and affect  Neuro: decreased sensation LUE/hand, adequate 4/4 strength in             Assessment:          Diagnosis Plan   1. Nonrheumatic aortic valve stenosis     2. Coronary artery disease involving native coronary artery of native heart without angina pectoris     3. Essential hypertension     4. Hyperlipidemia, unspecified hyperlipidemia type     5. Cerebrovascular accident (CVA), unspecified mechanism (CMS/HCC)                Plan:         This is a 74-year-old man with prior severe AS and singlevessel CAD, s/p  SAVR with SVG to OM2     1. Aortic Stenosis and CAD  - bioprosthetic AVR with SVG to OM   Pagni  - Plavix, Atorva 40, Coreg 25mg daily.     2. CVA:November 2019- thalamic stroke with residual LUE paresthesias/ sensory deficit. ZIO patch shows no AF. Related to severe hypertension. Will continue atorvastatin 40mg and Plavix.   3. HTN- BP is well controlled, lisinopril 10, coreg 25. Increasing lasix to 80 daily as below.   4. HLD- on atorva 40  5. DM- metformin and glimeperide  6. Diastolic dysfunction: LE edema is likely multifactorial including HFpEF and obesity. Increase Lasix to 80 daily.   7. Mild bl carotid artery disease    Dr. Lopez, thank you for referring this kind patient tot me. I will see him again in 6 months.     Wendy Braxton MD  02/26/20     Outpatient Encounter Medications as of 2/26/2020   Medication Sig Dispense Refill   • ascorbic acid (VITAMIN C) 1000 MG tablet Take 1,000 mg by mouth Daily.     • atorvastatin (LIPITOR) 40 MG tablet Take 40 mg by mouth Daily.     • carvedilol (COREG) 25 MG tablet Take 1 tablet by mouth Every 12 (Twelve) Hours. 60 tablet 2   • clonazePAM (KlonoPIN) 1 MG disintegrating tablet Take 2 mg by mouth Every Night.     • clopidogrel (PLAVIX) 75 MG tablet Take 75 mg by mouth Daily.     • furosemide (LASIX) 40 MG tablet Take 2 tablets by mouth Daily. 90 tablet 3   • gabapentin (NEURONTIN) 300 MG capsule Take 300 mg by mouth 3 (Three) Times a Day.     • glimepiride (AMARYL) 2 MG tablet Take 2 mg by mouth Every Morning Before Breakfast.     • lisinopril (PRINIVIL,ZESTRIL) 10 MG tablet Take 10 mg by mouth Daily.     • loratadine (CLARITIN) 10 MG tablet Take 10 mg by mouth Daily.     • metFORMIN (GLUCOPHAGE) 1000 MG tablet Take 1,000 mg by mouth 2 (Two) Times a Day With Meals.     • potassium chloride (K-DUR,KLOR-CON) 10 MEQ CR tablet Take 10 mEq by mouth Daily.     • sertraline (ZOLOFT) 50 MG tablet Take 50 mg by mouth Daily.     • tamsulosin (FLOMAX) 0.4 MG capsule 24 hr capsule Take 1 capsule by mouth Every Night.     • vitamin B-12 (VITAMIN B-12) 1000 MCG tablet  Take 1 tablet by mouth Daily. 30 tablet 0   • [DISCONTINUED] furosemide (LASIX) 40 MG tablet Take 40 mg by mouth Daily.       No facility-administered encounter medications on file as of 2/26/2020.

## 2020-03-12 ENCOUNTER — OFFICE VISIT (OUTPATIENT)
Dept: SLEEP MEDICINE | Facility: HOSPITAL | Age: 75
End: 2020-03-12

## 2020-03-12 VITALS — WEIGHT: 290.4 LBS | BODY MASS INDEX: 44.01 KG/M2 | HEART RATE: 85 BPM | HEIGHT: 68 IN | OXYGEN SATURATION: 92 %

## 2020-03-12 DIAGNOSIS — E66.01 CLASS 3 SEVERE OBESITY DUE TO EXCESS CALORIES WITH SERIOUS COMORBIDITY AND BODY MASS INDEX (BMI) OF 40.0 TO 44.9 IN ADULT (HCC): ICD-10-CM

## 2020-03-12 DIAGNOSIS — G47.33 OSA ON CPAP: Primary | ICD-10-CM

## 2020-03-12 DIAGNOSIS — Z99.89 OSA ON CPAP: Primary | ICD-10-CM

## 2020-03-12 PROCEDURE — G0463 HOSPITAL OUTPT CLINIC VISIT: HCPCS

## 2020-03-12 PROCEDURE — 99213 OFFICE O/P EST LOW 20 MIN: CPT | Performed by: INTERNAL MEDICINE

## 2020-03-12 NOTE — PROGRESS NOTES
"[facility] Note     Chief Complaint:  STEF     Primary Care Physician: Mariya Lopez MD    Interval History:   I last saw the patient 1 year ago.  He is stable without new complaints presently.  He goes to bed at 9:30 PM and awakens at 7 AM.  [place] Sleepiness Scale is normal at 2.    In 2019, the patient did have a thalamic CVA.    Review of Systems:    A complete review of systems was done and all were negative with the exception of postnasal drip and some anxiety    Social History:    Social History     Socioeconomic History   • Marital status:      Spouse name: Not on file   • Number of children: Not on file   • Years of education: Not on file   • Highest education level: Not on file   Tobacco Use   • Smoking status: Former Smoker     Packs/day: 0.50     Types: Cigarettes     Last attempt to quit: 1983     Years since quittin.2   • Smokeless tobacco: Never Used   • Tobacco comment: Smoked between the ages of 25 and 40.   Substance and Sexual Activity   • Alcohol use: No   • Drug use: No   • Sexual activity: Defer       Allergies:  Contrast dye     Medication Review:  Reviewed.      Vital Signs:    Vitals:    20 0827   Pulse: 85   SpO2: 92%   Weight: 132 kg (290 lb 6.4 oz)   Height: 172.7 cm (68\")     Body mass index is 44.16 kg/m².    Physical Exam:    Constitutional:  Well developed 74 y.o. male that appears in no apparent distress.  Awake & oriented times 3.  Normal mood with normal recent and remote memory and normal judgement.  Eyes:  Conjunctivae normal.  Oropharynx:  moist mucous membranes without exudate and a large tongue and uvula and narrowed posterior pharyngeal opening and class II-3 Mallampati airway     Results Review:  DME is [facility] and he uses a fullface mask.  Downloads between  and 3/11/2020 compliance 100%.  Average usage is 8 hours and 47 minutes.  Average AHI is normal without significant leak.  Average AutoCPAP pressure " is 16.7 and his auto CPAP is 15-20.       Impression:   Obstructive sleep apnea adequately treated with auto CPAP with good compliance and usage and no complaints of hypersomnolence.    Plan:  Good sleep hygiene measures should be maintained.  Weight loss would be beneficial in this patient who is morbidly obese by BMI.  The patient is benefiting from the treatment being provided.     The patient will continue auto CPAP and a new prescription will be sent to his DME    The patient will call for any problems and will follow up in 1 year.      Jensen Mas MD  Sleep Medicine  03/12/20  08:40

## 2020-09-14 ENCOUNTER — TELEPHONE (OUTPATIENT)
Dept: CARDIOLOGY | Facility: CLINIC | Age: 75
End: 2020-09-14

## 2020-09-14 DIAGNOSIS — R07.89 CHEST PAIN, ATYPICAL: Primary | ICD-10-CM

## 2020-09-14 RX ORDER — PREDNISONE 50 MG/1
TABLET ORAL
Qty: 3 TABLET | Refills: 0 | Status: SHIPPED | OUTPATIENT
Start: 2020-09-14 | End: 2021-12-14

## 2020-09-14 NOTE — TELEPHONE ENCOUNTER
Spoke to Alexandra, his daughter. Having a lot of constant chest and rib and sternal pain x 3 weeks, incapacitating. Abdominal CT concerning for malignancy. They have a CT ordered by PCP at [facility], but prefer to do it at [facility]. I explained it doesn't sound like angina, but if no answer with CT we can do a stress. I will order the CT here per their request. I ordered pre-medication for contrast allergy as well - prednisone 50 13 hours, 7, and 1 hour prior as well as benadryl 50 one hour prior OTC. She is a former nurse and understands.

## 2020-09-14 NOTE — TELEPHONE ENCOUNTER
Dr Braxton,  pts daughter, Alexandra [phone] is calling for advice.    She explains that her father had a CT of the ABD today ( have printed it from care everywhere and placed on your desk to review)  Alexandra stated for the last 3-4 weeks the patient has been experiencing pain to his left side and left breast, and radiates around his back.  She said it mimics the pain he had with his CABG.  She said the pt is not sleeping well due to the pain and he can't lay flat and is averaging 2 hours of sleep a night.  She stated he doesn't have a temp.  Lastly he has 2-3+to left lower extremity edema and SOA.    They are calling for advise.  They are concerned about the current findings in the CT and would like to know what they need to do.    Please advise on how to proceed  Thanks  Alesha Ambrosio RN  Triage nurse

## 2020-09-15 NOTE — TELEPHONE ENCOUNTER
Hospital scheduling,    Pt's daughter called asking if the CT scan could be scheduled as soon as possible since he is in pain. Could you call to get this scheduled?     Thank you!    Bushra Acosta RN  Triage [facility]

## 2020-09-21 ENCOUNTER — HOSPITAL ENCOUNTER (OUTPATIENT)
Dept: CT IMAGING | Facility: HOSPITAL | Age: 75
Discharge: HOME | End: 2020-09-21
Admitting: INTERNAL MEDICINE

## 2020-09-21 DIAGNOSIS — R07.89 CHEST PAIN, ATYPICAL: ICD-10-CM

## 2020-09-21 LAB — CREAT BLDA-MCNC: 0.9 MG/DL (ref 0.6–1.3)

## 2020-09-21 PROCEDURE — 25010000002 IOPAMIDOL 61 % SOLUTION: Performed by: INTERNAL MEDICINE

## 2020-09-21 PROCEDURE — 82565 ASSAY OF CREATININE: CPT

## 2020-09-21 PROCEDURE — 71260 CT THORAX DX C+: CPT

## 2020-09-21 RX ADMIN — IOPAMIDOL 85 ML: 612 INJECTION, SOLUTION INTRAVENOUS at 16:56

## 2020-09-23 NOTE — PROGRESS NOTES
Spoke to patient about results, he has talked to his PCP today who is sending him to see gastroenterology

## 2020-10-02 ENCOUNTER — TELEPHONE (OUTPATIENT)
Dept: CARDIOLOGY | Facility: CLINIC | Age: 75
End: 2020-10-02

## 2020-10-02 ENCOUNTER — OFFICE VISIT (OUTPATIENT)
Dept: CARDIOLOGY | Facility: CLINIC | Age: 75
End: 2020-10-02

## 2020-10-02 VITALS
WEIGHT: 282 LBS | HEIGHT: 68 IN | BODY MASS INDEX: 42.74 KG/M2 | DIASTOLIC BLOOD PRESSURE: 96 MMHG | SYSTOLIC BLOOD PRESSURE: 142 MMHG | HEART RATE: 84 BPM

## 2020-10-02 DIAGNOSIS — I25.10 CORONARY ARTERY DISEASE INVOLVING NATIVE CORONARY ARTERY OF NATIVE HEART WITHOUT ANGINA PECTORIS: ICD-10-CM

## 2020-10-02 DIAGNOSIS — I35.0 NONRHEUMATIC AORTIC VALVE STENOSIS: Primary | ICD-10-CM

## 2020-10-02 DIAGNOSIS — E78.1 PURE HYPERTRIGLYCERIDEMIA: ICD-10-CM

## 2020-10-02 DIAGNOSIS — I10 ESSENTIAL HYPERTENSION: ICD-10-CM

## 2020-10-02 PROCEDURE — 93000 ELECTROCARDIOGRAM COMPLETE: CPT | Performed by: INTERNAL MEDICINE

## 2020-10-02 PROCEDURE — 99214 OFFICE O/P EST MOD 30 MIN: CPT | Performed by: INTERNAL MEDICINE

## 2020-10-02 RX ORDER — LOSARTAN POTASSIUM AND HYDROCHLOROTHIAZIDE 25; 100 MG/1; MG/1
TABLET ORAL DAILY
COMMUNITY
Start: 2020-09-30 | End: 2020-10-02

## 2020-10-02 RX ORDER — METHOCARBAMOL 750 MG/1
TABLET, FILM COATED ORAL AS NEEDED
COMMUNITY
Start: 2020-09-29

## 2020-10-02 RX ORDER — HYDROCODONE BITARTRATE AND ACETAMINOPHEN 5; 325 MG/1; MG/1
TABLET ORAL AS NEEDED
COMMUNITY
Start: 2020-09-15

## 2020-10-02 RX ORDER — ICOSAPENT ETHYL 1000 MG/1
2 CAPSULE ORAL 2 TIMES DAILY WITH MEALS
Qty: 120 CAPSULE | Refills: 3 | Status: SHIPPED | OUTPATIENT
Start: 2020-10-02 | End: 2021-04-06

## 2020-10-02 NOTE — PROGRESS NOTES
Subjective:     Encounter Date: 10/02/20      Patient ID: Bird Gallegos is a 75 y.o. male.    Chief Complaint: CAD, AVR, CVA    This is a 75-year-old man with past medical history of CAD, AS s/p bioprothetic AVR obstructive sleep apnea, diabetes on oral medications, hypertension.      I originally met him in March 2019 around the time his  primary care physician noted a significant murmur.   He had an echocardiogram at [place] which showed severe aortic stenosis with a mean gradient of 45 mmHg and a peak velocity of 4.5 m/s squared, with a calculated KELSEA of 0.51 cm².  He underwent left heart catheterization as an outpatient which showed single-vessel coronary disease of the OM 2.  Given coronary disease and severe aortic stenosis, the surgeons performed traditional surgical AVR with single-vessel vein graft bypass to the OM.      He did quite well until November 2019 when he was diagnosed with a thalamic stroke due to severe hypertension. He was treated with DAPT x 30 days and is now on Plavix. His ZIO patch showed no atrial arrhythmias.     Today he returns.  He has no cardiac complaints.  HE has a lot of LUQ abdominal pain. He had a CT A/P and chest CT without any intraabdoinal pathology. Pancreas looked ok. Esophageal thickening suspicious for malignancy though and he will see a GI end of month. His TG are super elevated >400.     He is a former .  He quit smoking 30 years ago.  He is a recent .  His daughter is a former nurse. HE recently got a dog    REVIEW OF SYSTEMS:   All systems reviewed.  Pertinent positives identified in HPI.  All other systems are negative.    The following portions of the patient's history were reviewed and updated as appropriate: allergies, current medications, past family history, past medical history, past social history, past surgical history and problem list.    Past Medical History:   Diagnosis Date   • Anxiety and depression    • Aortic stenosis    •  Arthritis    • Coronary artery disease    • CTS (carpal tunnel syndrome)    • Degenerative joint disease (DJD) of lumbar spine    • Diabetes mellitus (CMS/HCC)    • Diabetic neuropathy (CMS/HCC)    • Difficulty walking    • Diverticulosis    • COLVIN (dyspnea on exertion)    • GERD (gastroesophageal reflux disease)    • Headache, tension-type    • Heart murmur    • History of cellulitis     LEFT LEG   • HL (hearing loss)    • Hypertension    • STEF (obstructive sleep apnea)    • STEF on auto CPAP    • Stroke (CMS/HCC)    • Vision loss        Family History   Problem Relation Age of Onset   • Heart disease Mother    • Arthritis Mother    • Cancer Father    • Hypertension Brother    • Diabetes Brother    • Hypertension Brother    • Parkinsonism Sister    • Arthritis Sister    • Heart disease Maternal Grandmother    • Heart disease Maternal Grandfather    • Heart disease Paternal Grandmother    • Malig Hyperthermia Neg Hx        Social History     Socioeconomic History   • Marital status:      Spouse name: Not on file   • Number of children: Not on file   • Years of education: Not on file   • Highest education level: Not on file   Tobacco Use   • Smoking status: Former Smoker     Packs/day: 0.50     Types: Cigarettes     Quit date:      Years since quittin.7   • Smokeless tobacco: Never Used   • Tobacco comment: Smoked between the ages of 25 and 40.   Substance and Sexual Activity   • Alcohol use: No   • Drug use: No   • Sexual activity: Defer       Allergies   Allergen Reactions   • Contrast Dye Hives     Hives and rash reported       Past Surgical History:   Procedure Laterality Date   • BACK SURGERY      X2   • CARDIAC CATHETERIZATION N/A 3/21/2019    Procedure: Coronary angiography;  Surgeon: Wendy Braxton MD;  Location: [facility] INVASIVE LOCATION;  Service: Cardiology   • CORONARY ARTERY BYPASS GRAFT WITH AORTIC VALVE REPAIR/REPLACEMENT N/A 3/26/2019    Procedure: INTRAOP QUIQUE; CORONARY ARTERY BYPASS  X 1 UTILIZING ENDOSCOPICALLY HARVESTED RIGHT GREATER SAPHENOUS VEIN; AORTIC VALVE REPLACEMENT AND PRP.;  Surgeon: Alexx Gandara MD;  Location: [facility] OR;  Service: Cardiothoracic   • POSTERIOR CERVICAL FUSION     • TENDON REPAIR Left     KNEE   • WRIST SURGERY Bilateral          ECG 12 Lead    Date/Time: 10/2/2020 8:54 AM  Performed by: Wendy Braxton MD  Authorized by: Wendy Braxton MD   Comparison: compared with previous ECG from 2/26/2020  Similar to previous ECG  Rhythm: sinus rhythm  Rate: normal  Conduction: incomplete right bundle branch block  ST Segments: ST segments normal  T Waves: T waves normal  QRS axis: normal  Other: no other findings    Clinical impression: non-specific ECG               Objective:     Physical Exam  GEN: no distress, alert and oriented  Eyes: normal sclera, normal lids and lashes  HENT: moist mucus membranes,   Lungs CTAB, no rales or wheezes  Chest: no abnormalities  Neck: Too large to evaluate JVD.  CV: RRR, left ear murmur has resolved  Abdo: Obese soft,  Nontender, nondistended. No pain on deep palpation but has pain when laying back  Extr: +2 bl edema, stable  Skin: no rash, warm, dry  Heme/Lymph: no bruising  Psych: organized thought, normal behavior and affect  Neuro: decreased sensation LUE/hand, adequate 4/4 strength in             Assessment:          Diagnosis Plan   1. Nonrheumatic aortic valve stenosis     2. Coronary artery disease involving native coronary artery of native heart without angina pectoris  Lipid Panel    ECG 12 Lead   3. Essential hypertension     4. Pure hypertriglyceridemia                Plan:         This is a 75-year-old man with prior severe AS and singlevessel CAD, s/p  SAVR with SVG to OM2     1. Aortic Stenosis s/p Bioprosthetic AVR  2 . CAD, no angina, s/p SVG to OM1  - Plavix, Atorva 40, Coreg 25mg daily.   3. CVA:November 2019- thalamic stroke with residual LUE paresthesias/ sensory deficit. ZIO patch shows no AF. Related to  severe hypertension. Will continue atorvastatin 40mg and Plavix.   4. HTN- BP is well controlled, lisinopril 10, coreg 25. He takes Lasix 40 daily with additional 40 prn for edema. He also has a RX for losartan - hctz, which I have stopped as he is also on an ACEi and Lasix.   5. HLD: TG are >400 in 9/20, 6/20 TG were >300. LDL unable to calculate. Start Vascepa 2g BID. I worry about pancreatitis at these levels.   6. Diastolic dysfunction: LE edema is likely multifactorial including HFpEF and obesity. Increase Lasix to 80 daily.   7. Mild bl carotid artery disease    Dr. Lopez, thank you for referring this kind patient tot me. I will see him again in 6 months.     Wendy Braxton MD  10/02/20     Outpatient Encounter Medications as of 10/2/2020   Medication Sig Dispense Refill   • ascorbic acid (VITAMIN C) 1000 MG tablet Take 1,000 mg by mouth Daily.     • atorvastatin (LIPITOR) 40 MG tablet Take 40 mg by mouth Daily.     • carvedilol (COREG) 25 MG tablet Take 1 tablet by mouth Every 12 (Twelve) Hours. 60 tablet 2   • clonazePAM (KlonoPIN) 1 MG disintegrating tablet Take 2 mg by mouth Every Night.     • clopidogrel (PLAVIX) 75 MG tablet Take 75 mg by mouth Daily.     • furosemide (LASIX) 40 MG tablet Take 2 tablets by mouth Daily. 90 tablet 3   • gabapentin (NEURONTIN) 300 MG capsule Take 300 mg by mouth 3 (Three) Times a Day.     • glimepiride (AMARYL) 2 MG tablet Take 2 mg by mouth Every Morning Before Breakfast.     • HYDROcodone-acetaminophen (NORCO) 5-325 MG per tablet Take  by mouth As Needed.     • lisinopril (PRINIVIL,ZESTRIL) 10 MG tablet Take 10 mg by mouth Daily.     • loratadine (CLARITIN) 10 MG tablet Take 10 mg by mouth Daily.     • metFORMIN (GLUCOPHAGE) 1000 MG tablet Take 1,000 mg by mouth 2 (Two) Times a Day With Meals.     • methocarbamol (ROBAXIN) 750 MG tablet Take  by mouth As Needed.     • potassium chloride (K-DUR,KLOR-CON) 10 MEQ CR tablet Take 10 mEq by mouth Daily.     • predniSONE  (DELTASONE) 50 MG tablet Take one tablet 12 hours, 7 hours and 1 hour prior to CT scan with contrast. 3 tablet 0   • sertraline (ZOLOFT) 50 MG tablet Take 50 mg by mouth Daily.     • tamsulosin (FLOMAX) 0.4 MG capsule 24 hr capsule Take 1 capsule by mouth Every Night.     • vitamin B-12 (VITAMIN B-12) 1000 MCG tablet Take 1 tablet by mouth Daily. 30 tablet 0   • [DISCONTINUED] losartan-hydrochlorothiazide (HYZAAR) 100-25 MG per tablet Take  by mouth Daily.     • icosapent ethyl (Vascepa) 1 g capsule capsule Take 2 g by mouth 2 (Two) Times a Day With Meals. 120 capsule 3     No facility-administered encounter medications on file as of 10/2/2020.

## 2020-10-02 NOTE — TELEPHONE ENCOUNTER
Mr. Gallegos went to  his new RX of vascepa and it cost more than he could afford.  I notified Dr. Braxton and she stated that she would send in an RX for tricor instead.    Patient notified and verbalized understanding.    Thank you,  Daisy Shen RN  [facility]  Triage

## 2021-03-11 ENCOUNTER — APPOINTMENT (OUTPATIENT)
Dept: SLEEP MEDICINE | Facility: HOSPITAL | Age: 76
End: 2021-03-11

## 2021-04-06 ENCOUNTER — OFFICE VISIT (OUTPATIENT)
Dept: CARDIOLOGY | Facility: CLINIC | Age: 76
End: 2021-04-06

## 2021-04-06 VITALS
WEIGHT: 278 LBS | HEIGHT: 68 IN | SYSTOLIC BLOOD PRESSURE: 122 MMHG | DIASTOLIC BLOOD PRESSURE: 70 MMHG | BODY MASS INDEX: 42.13 KG/M2 | HEART RATE: 68 BPM

## 2021-04-06 DIAGNOSIS — I25.10 CORONARY ARTERY DISEASE INVOLVING NATIVE CORONARY ARTERY OF NATIVE HEART WITHOUT ANGINA PECTORIS: ICD-10-CM

## 2021-04-06 DIAGNOSIS — I10 ESSENTIAL HYPERTENSION: ICD-10-CM

## 2021-04-06 DIAGNOSIS — I73.9 PVD (PERIPHERAL VASCULAR DISEASE) WITH CLAUDICATION (HCC): Primary | ICD-10-CM

## 2021-04-06 PROCEDURE — 99214 OFFICE O/P EST MOD 30 MIN: CPT | Performed by: INTERNAL MEDICINE

## 2021-04-06 NOTE — PROGRESS NOTES
Subjective:     Encounter Date: 04/06/21      Patient ID: Bird Gallegos is a 76 y.o. male.    Chief Complaint: CAD, AVR, CVA    This is a 76-year-old man with past medical history of CAD, AS s/p bioprothetic AVR obstructive sleep apnea, diabetes on oral medications, hypertension.      I originally met him in March 2019 around the time his  primary care physician noted a significant murmur.   He had an echocardiogram at [place] which showed severe aortic stenosis with a mean gradient of 45 mmHg and a peak velocity of 4.5 m/s squared, with a calculated KELSEA of 0.51 cm².  He underwent left heart catheterization as an outpatient which showed single-vessel coronary disease of the OM 2.  Given coronary disease and severe aortic stenosis, the surgeons performed traditional surgical AVR with single-vessel vein graft bypass to the OM.      He did quite well until November 2019 when he was diagnosed with a thalamic stroke due to severe hypertension. He was treated with DAPT x 30 days and is now on Plavix. His ZIO patch showed no atrial arrhythmias.     Today he returns.  He has no angina.  He walks his dog twice a day and has some dyspnea at the end but it is not limiting.  It is stable.  No orthopnea or PND.  He does note some electrical pains in his feet and upper left thigh.  He does not describe aching but it is worsened with exertion.  He takes gabapentin already for neuropathy.  At the last visit his triglycerides were very elevated.  We started Vascepa but it was too expensive.  He says he has had labs checked since then and they have been okay.    He is a former .  He quit smoking 30 years ago.  He is a recent .  His daughter is a former nurse.  He has a new border collOmmven dog who he has been exercising with.    REVIEW OF SYSTEMS:   All systems reviewed.  Pertinent positives identified in HPI.  All other systems are negative.    The following portions of the patient's history were reviewed and  updated as appropriate: allergies, current medications, past family history, past medical history, past social history, past surgical history and problem list.    Past Medical History:   Diagnosis Date   • Anxiety and depression    • Aortic stenosis    • Arthritis    • Coronary artery disease    • CTS (carpal tunnel syndrome)    • Degenerative joint disease (DJD) of lumbar spine    • Diabetes mellitus (CMS/HCC)    • Diabetic neuropathy (CMS/HCC)    • Difficulty walking    • Diverticulosis    • COLVIN (dyspnea on exertion)    • GERD (gastroesophageal reflux disease)    • Headache, tension-type    • Heart murmur    • History of cellulitis     LEFT LEG   • HL (hearing loss)    • Hypertension    • STEF (obstructive sleep apnea)    • STEF on auto CPAP    • Stroke (CMS/HCC)    • Vision loss        Family History   Problem Relation Age of Onset   • Heart disease Mother    • Arthritis Mother    • Cancer Father    • Hypertension Brother    • Diabetes Brother    • Hypertension Brother    • Parkinsonism Sister    • Arthritis Sister    • Heart disease Maternal Grandmother    • Heart disease Maternal Grandfather    • Heart disease Paternal Grandmother    • Malig Hyperthermia Neg Hx        Social History     Socioeconomic History   • Marital status:      Spouse name: Not on file   • Number of children: Not on file   • Years of education: Not on file   • Highest education level: Not on file   Tobacco Use   • Smoking status: Former Smoker     Packs/day: 0.50     Types: Cigarettes     Quit date:      Years since quittin.2   • Smokeless tobacco: Never Used   • Tobacco comment: Smoked between the ages of 25 and 40.   Substance and Sexual Activity   • Alcohol use: No   • Drug use: No   • Sexual activity: Defer       Allergies   Allergen Reactions   • Contrast Dye Hives     Hives and rash reported       Past Surgical History:   Procedure Laterality Date   • BACK SURGERY      X2   • CARDIAC CATHETERIZATION N/A 3/21/2019     Procedure: Coronary angiography;  Surgeon: Wendy Braxton MD;  Location:  [facility];  Service: Cardiology   • CORONARY ARTERY BYPASS GRAFT WITH AORTIC VALVE REPAIR/REPLACEMENT N/A 3/26/2019    Procedure: INTRAOP QUIQUE; CORONARY ARTERY BYPASS X 1 UTILIZING ENDOSCOPICALLY HARVESTED RIGHT GREATER SAPHENOUS VEIN; AORTIC VALVE REPLACEMENT AND PRP.;  Surgeon: Alexx Gandara MD;  Location: [facility] MAIN OR;  Service: Cardiothoracic   • POSTERIOR CERVICAL FUSION     • TENDON REPAIR Left     KNEE   • WRIST SURGERY Bilateral        Procedures       Objective:     Physical Exam  GEN: no distress, alert and oriented  Eyes: normal sclera, normal lids and lashes  HENT: moist mucus membranes,   Lungs CTAB, no rales or wheezes  Chest: no abnormalities  Neck: Too large to evaluate JVD.  CV: RRR, left ear murmur has resolved  Abdo: Obese soft,  Nontender, nondistended. No pain on deep palpation but has pain when laying back  Extr: +2 bl edema, stable  Skin: no rash, warm, dry  Heme/Lymph: no bruising  Psych: organized thought, normal behavior and affect  Neuro: decreased sensation LUE/hand, adequate 4/4 strength in             Assessment:          Diagnosis Plan   1. PVD (peripheral vascular disease) with claudication (CMS/HCC)  Doppler Arterial Multi Level Lower Extremity - Bilateral CAR   2. Essential hypertension     3. Coronary artery disease involving native coronary artery of native heart without angina pectoris                Plan:         This is a 76-year-old man with prior severe AS and singlevessel CAD, s/p  SAVR with SVG to OM2     1. Aortic Stenosis s/p Bioprosthetic AVR  2 . CAD, no angina, s/p SVG to OM1.  Plavix.   3. CVA:November 2019- thalamic stroke with residual LUE paresthesias/ sensory deficit. ZIO patch shows no AF. Related to severe hypertension. Will continue atorvastatin 40mg and Plavix.   4. HTN- BP is well controlled, lisinopril 10, coreg 25. He takes Lasix 80.   5. HLD: We will get  lipids from Dr. Florian.  He could not afford Vascepa, triglycerides were over 400.  We will add TriCor if still elevated.  6. Diastolic dysfunction: LE edema is likely multifactorial including HFpEF and obesity.  7.  Questionable claudication, left worse than right leg.  Also complains of electrical pain which is more likely neuropathy.  We will get bilateral ABIs.    Dr. Lopez, thank you for referring this kind patient tot me. I will see him again in 6 months.  Please call with any questions or concerns.    Wendy Braxton MD  04/06/21     Outpatient Encounter Medications as of 4/6/2021   Medication Sig Dispense Refill   • ascorbic acid (VITAMIN C) 1000 MG tablet Take 1,000 mg by mouth Daily.     • atorvastatin (LIPITOR) 40 MG tablet Take 40 mg by mouth Daily.     • carvedilol (COREG) 25 MG tablet Take 1 tablet by mouth Every 12 (Twelve) Hours. 60 tablet 2   • clonazePAM (KlonoPIN) 1 MG disintegrating tablet Take 2 mg by mouth Every Night.     • clopidogrel (PLAVIX) 75 MG tablet Take 75 mg by mouth Daily.     • furosemide (LASIX) 40 MG tablet Take 2 tablets by mouth Daily. 90 tablet 3   • gabapentin (NEURONTIN) 300 MG capsule Take 300 mg by mouth 3 (Three) Times a Day.     • glimepiride (AMARYL) 2 MG tablet Take 2 mg by mouth Every Morning Before Breakfast.     • HYDROcodone-acetaminophen (NORCO) 5-325 MG per tablet Take  by mouth As Needed.     • lisinopril (PRINIVIL,ZESTRIL) 10 MG tablet Take 10 mg by mouth Daily.     • loratadine (CLARITIN) 10 MG tablet Take 10 mg by mouth Daily.     • metFORMIN (GLUCOPHAGE) 1000 MG tablet Take 1,000 mg by mouth 2 (Two) Times a Day With Meals.     • methocarbamol (ROBAXIN) 750 MG tablet Take  by mouth As Needed.     • potassium chloride (K-DUR,KLOR-CON) 10 MEQ CR tablet Take 10 mEq by mouth Daily.     • predniSONE (DELTASONE) 50 MG tablet Take one tablet 12 hours, 7 hours and 1 hour prior to CT scan with contrast. 3 tablet 0   • sertraline (ZOLOFT) 50 MG tablet Take 50 mg by  mouth Daily.     • tamsulosin (FLOMAX) 0.4 MG capsule 24 hr capsule Take 1 capsule by mouth Every Night.     • vitamin B-12 (VITAMIN B-12) 1000 MCG tablet Take 1 tablet by mouth Daily. 30 tablet 0   • [DISCONTINUED] icosapent ethyl (Vascepa) 1 g capsule capsule Take 2 g by mouth 2 (Two) Times a Day With Meals. 120 capsule 3     No facility-administered encounter medications on file as of 4/6/2021.

## 2021-04-09 ENCOUNTER — HOSPITAL ENCOUNTER (OUTPATIENT)
Dept: CARDIOLOGY | Facility: HOSPITAL | Age: 76
Discharge: HOME | End: 2021-04-09
Admitting: INTERNAL MEDICINE

## 2021-04-09 DIAGNOSIS — I73.9 PVD (PERIPHERAL VASCULAR DISEASE) WITH CLAUDICATION (HCC): ICD-10-CM

## 2021-04-09 LAB
BH CV LOWER ARTERIAL LEFT ABI RATIO: 1.02
BH CV LOWER ARTERIAL LEFT CALF RATIO: 1.03
BH CV LOWER ARTERIAL LEFT HIGH THIGH SYS MAX: 212 MMHG
BH CV LOWER ARTERIAL LEFT LOW THIGH SYS MAX: 179 MMHG
BH CV LOWER ARTERIAL LEFT POPLITEAL SYS MAX: 179 MMHG
BH CV LOWER ARTERIAL LEFT POST TIBIAL SYS MAX: 160 MMHG
BH CV LOWER ARTERIAL RIGHT ABI RATIO: 1.06
BH CV LOWER ARTERIAL RIGHT CALF RATIO: 1.13
BH CV LOWER ARTERIAL RIGHT HIGH THIGH SYS MAX: 216 MMHG
BH CV LOWER ARTERIAL RIGHT LOW THIGH SYS MAX: 187 MMHG
BH CV LOWER ARTERIAL RIGHT POPLITEAL SYS MAX: 177 MMHG
BH CV LOWER ARTERIAL RIGHT POST TIBIAL SYS MAX: 169 MMHG
UPPER ARTERIAL LEFT ARM BRACHIAL SYS MAX: 157 MMHG
UPPER ARTERIAL RIGHT ARM BRACHIAL SYS MAX: 152 MMHG

## 2021-04-09 PROCEDURE — 93923 UPR/LXTR ART STDY 3+ LVLS: CPT | Performed by: INTERNAL MEDICINE

## 2021-04-09 PROCEDURE — 93923 UPR/LXTR ART STDY 3+ LVLS: CPT

## 2021-04-13 NOTE — PROGRESS NOTES
Called patient. Mildly reduced flow through PT but otherwise flow looked ok. I encouraged him to keep walking to improve flow.

## 2021-04-22 ENCOUNTER — OFFICE VISIT (OUTPATIENT)
Dept: SLEEP MEDICINE | Facility: HOSPITAL | Age: 76
End: 2021-04-22

## 2021-04-22 VITALS — BODY MASS INDEX: 42.89 KG/M2 | HEART RATE: 77 BPM | WEIGHT: 283 LBS | OXYGEN SATURATION: 95 % | HEIGHT: 68 IN

## 2021-04-22 DIAGNOSIS — E66.01 CLASS 3 SEVERE OBESITY DUE TO EXCESS CALORIES WITH SERIOUS COMORBIDITY AND BODY MASS INDEX (BMI) OF 40.0 TO 44.9 IN ADULT (HCC): ICD-10-CM

## 2021-04-22 DIAGNOSIS — Z99.89 OSA ON CPAP: Primary | ICD-10-CM

## 2021-04-22 DIAGNOSIS — G47.33 OSA ON CPAP: Primary | ICD-10-CM

## 2021-04-22 PROCEDURE — 99213 OFFICE O/P EST LOW 20 MIN: CPT | Performed by: INTERNAL MEDICINE

## 2021-04-22 PROCEDURE — G0463 HOSPITAL OUTPT CLINIC VISIT: HCPCS

## 2021-04-22 NOTE — PROGRESS NOTES
"[facility] Note     Chief Complaint:  STEF     Primary Care Physician: Mariya Lopez MD    Interval History:   The patient is a 76 y.o. male  who I last saw 3/12/2020 and that note was reviewed.  The patient reports she is unchanged.  He goes to bed between 9 and 10 PM and awakens between 7 and 8 AM.  His main problem are his feet and legs due to peripheral neuropathy.  [place] Sleepiness Scale is normal at 5    Review of Systems:    A complete review of systems was done and all were negative with the exception of shortness of breath with exertion, nasal congestion, cough and MAGY    Social History:    Social History     Socioeconomic History   • Marital status:      Spouse name: Not on file   • Number of children: Not on file   • Years of education: Not on file   • Highest education level: Not on file   Tobacco Use   • Smoking status: Former Smoker     Packs/day: 0.50     Types: Cigarettes     Quit date:      Years since quittin.3   • Smokeless tobacco: Never Used   • Tobacco comment: Smoked between the ages of 25 and 40.   Substance and Sexual Activity   • Alcohol use: No   • Drug use: No   • Sexual activity: Defer       Allergies:  Contrast dye     Medication Review:  Reviewed.      Vital Signs:    Vitals:    21 0900   Pulse: 77   SpO2: 95%   Weight: 128 kg (283 lb)   Height: 172.7 cm (68\")     Body mass index is 43.03 kg/m².    Physical Exam:    Constitutional:  Well developed 76 y.o. male that appears in no apparent distress.  Awake & oriented times 3.  Normal mood with normal recent and remote memory and normal judgement.  Eyes:  Conjunctivae normal.  Oropharynx: Previously, moist mucous membranes without exudate and enlarged tongue and uvula and narrowed posterior pharyngeal opening and class II-3 Mallampati airway, patient is wearing a facemask.     Downloaded PAP Data Reviewed For Compliance:  DME is [facility] and he uses a fullface mask.  Downloads " between 1/22 and 4/21/2021 compliance 100%.  Average usage is 8 hours and 57 minutes.  Average AHI is normal without leak.  Average AutoCPAP pressure is 16.9 and his auto CPAP is 15-20.    I have reviewed the above results and compared them with the patient's last downloads and reviewed with the patient.    Impression:   Obstructive sleep apnea adequately treated with auto CPAP. The patient appears to be at goal with good compliance and usage. The patient has no complaints of hypersomnolence.    Plan:  Good sleep hygiene measures should be maintained.  Weight loss would be beneficial in this patient who is obese by BMI.      After evaluating the patient and assessing results available, the patient is benefiting from the treatment being provided.     The patient will continue auto CPAP.  After clinical evaluation and review of downloads, I recommend no changes.  A new prescription will be sent to the patient's DME.    I answered all of the patient's questions.  The patient will call for any problems and will follow up in 1 year.      Jensen Mas MD  Sleep Medicine  04/22/21  09:57 EDT

## 2021-04-27 PROBLEM — E66.01 MORBID OBESITY WITH BMI OF 40.0-44.9, ADULT (HCC): Status: RESOLVED | Noted: 2019-11-14 | Resolved: 2021-04-27

## 2021-12-14 ENCOUNTER — OFFICE VISIT (OUTPATIENT)
Dept: CARDIOLOGY | Facility: CLINIC | Age: 76
End: 2021-12-14

## 2021-12-14 VITALS
DIASTOLIC BLOOD PRESSURE: 78 MMHG | HEIGHT: 68 IN | WEIGHT: 273 LBS | HEART RATE: 80 BPM | BODY MASS INDEX: 41.37 KG/M2 | SYSTOLIC BLOOD PRESSURE: 142 MMHG

## 2021-12-14 DIAGNOSIS — E78.5 HYPERLIPIDEMIA, UNSPECIFIED HYPERLIPIDEMIA TYPE: Primary | ICD-10-CM

## 2021-12-14 PROCEDURE — 93000 ELECTROCARDIOGRAM COMPLETE: CPT | Performed by: INTERNAL MEDICINE

## 2021-12-14 PROCEDURE — 99214 OFFICE O/P EST MOD 30 MIN: CPT | Performed by: INTERNAL MEDICINE

## 2021-12-14 RX ORDER — POTASSIUM CHLORIDE 600 MG/1
CAPSULE, EXTENDED RELEASE ORAL
COMMUNITY

## 2021-12-14 RX ORDER — GLIMEPIRIDE 4 MG/1
4 TABLET ORAL DAILY
COMMUNITY
Start: 2021-05-11 | End: 2022-05-11

## 2021-12-14 RX ORDER — LISINOPRIL 20 MG/1
20 TABLET ORAL DAILY
COMMUNITY
Start: 2021-11-04

## 2021-12-14 RX ORDER — KETOCONAZOLE 20 MG/ML
SHAMPOO TOPICAL
COMMUNITY
Start: 2021-08-17

## 2021-12-14 RX ORDER — LOSARTAN POTASSIUM AND HYDROCHLOROTHIAZIDE 25; 100 MG/1; MG/1
1 TABLET ORAL DAILY
COMMUNITY
Start: 2021-11-05 | End: 2021-12-14

## 2021-12-14 RX ORDER — FENOFIBRATE 145 MG/1
145 TABLET, COATED ORAL DAILY
Qty: 90 TABLET | Refills: 3 | Status: SHIPPED | OUTPATIENT
Start: 2021-12-14 | End: 2022-08-31

## 2021-12-14 NOTE — PROGRESS NOTES
Subjective:     Encounter Date: 12/14/21      Patient ID: Bird Gallegos is a 76 y.o. male.    Chief Complaint: CAD, AVR, CVA    This is a 76-year-old man with past medical history of CAD, AS s/p bioprothetic AVR obstructive sleep apnea, diabetes on oral medications, hypertension.      I originally met him in March 2019 around the time his  primary care physician noted a significant murmur.   He had an echocardiogram at [facility] which showed severe aortic stenosis with a mean gradient of 45 mmHg and a peak velocity of 4.5 m/s squared, with a calculated KELSEA of 0.51 cm².  He underwent left heart catheterization as an outpatient which showed single-vessel coronary disease of the OM 2.  Given coronary disease and severe aortic stenosis, the surgeons performed traditional surgical AVR with single-vessel vein graft bypass to the OM.      He did quite well until November 2019 when he was diagnosed with a thalamic stroke due to severe hypertension. He was treated with DAPT x 30 days and is now on Plavix. His ZIO patch showed no atrial arrhythmias.     Today he returns.  No angina. Some pleuritic chest pain when cold outside, with productive cough and wheeze. No inhalers or formal COPD diagnosis.  this summer, previously could not afford Vascepa.     He is a former .  He quit smoking 30 years ago.  He is a recent .  His daughter is a former nurse.  He has a new border collie dog who he has been walking with.    REVIEW OF SYSTEMS:   All systems reviewed.  Pertinent positives identified in HPI.  All other systems are negative.    The following portions of the patient's history were reviewed and updated as appropriate: allergies, current medications, past family history, past medical history, past social history, past surgical history and problem list.    Past Medical History:   Diagnosis Date   • Anxiety and depression    • Aortic stenosis    • Arthritis    • Coronary artery disease    • CTS (carpal  tunnel syndrome)    • Degenerative joint disease (DJD) of lumbar spine    • Diabetes mellitus (CMS/HCC)    • Diabetic neuropathy (CMS/HCC)    • Difficulty walking    • Diverticulosis    • COLVIN (dyspnea on exertion)    • GERD (gastroesophageal reflux disease)    • Headache, tension-type    • Heart murmur    • History of cellulitis     LEFT LEG   • HL (hearing loss)    • Hypertension    • STEF (obstructive sleep apnea)    • STEF on auto CPAP    • Stroke (CMS/HCC)    • Vision loss        Family History   Problem Relation Age of Onset   • Heart disease Mother    • Arthritis Mother    • Cancer Father    • Hypertension Brother    • Diabetes Brother    • Hypertension Brother    • Parkinsonism Sister    • Arthritis Sister    • Heart disease Maternal Grandmother    • Heart disease Maternal Grandfather    • Heart disease Paternal Grandmother    • Malig Hyperthermia Neg Hx        Social History     Socioeconomic History   • Marital status:    Tobacco Use   • Smoking status: Former Smoker     Packs/day: 0.50     Types: Cigarettes     Quit date:      Years since quittin.9   • Smokeless tobacco: Never Used   • Tobacco comment: Smoked between the ages of 25 and 40.   Substance and Sexual Activity   • Alcohol use: No   • Drug use: No   • Sexual activity: Defer       Allergies   Allergen Reactions   • Contrast Dye Hives     Hives and rash reported       Past Surgical History:   Procedure Laterality Date   • BACK SURGERY      X2   • CARDIAC CATHETERIZATION N/A 3/21/2019    Procedure: Coronary angiography;  Surgeon: Wendy Braxton MD;  Location:  INVASIVE LOCATION;  Service: Cardiology   • CORONARY ARTERY BYPASS GRAFT WITH AORTIC VALVE REPAIR/REPLACEMENT N/A 3/26/2019    Procedure: INTRAOP QUIQUE; CORONARY ARTERY BYPASS X 1 UTILIZING ENDOSCOPICALLY HARVESTED RIGHT GREATER SAPHENOUS VEIN; AORTIC VALVE REPLACEMENT AND PRP.;  Surgeon: Alexx Gandara MD;  Location: [facility] OR;  Service: Cardiothoracic   •  POSTERIOR CERVICAL FUSION     • TENDON REPAIR Left     KNEE   • WRIST SURGERY Bilateral          ECG 12 Lead    Date/Time: 12/14/2021 8:28 AM  Performed by: Wendy Braxton MD  Authorized by: Wendy Braxton MD   Comparison: compared with previous ECG from 10/2/2020  Similar to previous ECG  Rhythm: sinus rhythm  Rate: normal  Conduction: conduction normal  ST Segments: ST segments normal  T Waves: T waves normal  QRS axis: normal  Other findings: left atrial abnormality    Clinical impression: non-specific ECG               Objective:     Physical Exam  GEN: no distress, alert and oriented  Eyes: normal sclera, normal lids and lashes  HENT: moist mucus membranes,   Lungs CTAB, no rales or wheezes  Chest: no abnormalities  Neck: Too large to evaluate JVD.  CV: RRR, left ear murmur has resolved  Abdo: Obese soft,  Nontender, nondistended. No pain on deep palpation but has pain when laying back  Extr: +2 bl edema, stable  Skin: no rash, warm, dry  Heme/Lymph: no bruising  Psych: organized thought, normal behavior and affect  Neuro: decreased sensation LUE/hand, adequate 4/4 strength in             Assessment:          Diagnosis Plan   1. Hyperlipidemia, unspecified hyperlipidemia type                Plan:         This is a 76-year-old man with prior severe AS and singlevessel CAD, s/p  SAVR with SVG to OM2     1. Aortic Stenosis s/p Bioprosthetic AVR, stable  2 . CAD, no angina, s/p SVG to OM1.  Plavix.   3. CVA:November 2019- thalamic stroke with residual LUE paresthesias/ sensory deficit. ZIO patch shows no AF. Related to severe hypertension. Will continue atorvastatin 40mg and Plavix.   4. HTN- BP is well controlled, lisinopril 20, coreg 25. He takes Lasix 40.   5. HLD: TG still elevated >300 Start Tricor 145 daily  6. Diastolic dysfunction: LE edema is likely multifactorial including HFpEF and obesity.  7. LE pain, mild abnormalitiy in RALPH.   8. Bronchitis- asked to discuss with Dr. Meena Robledo, thank  you for referring this kind patient tot me. I will see him again in 6 months.  Please call with any questions or concerns.    Wendy Braxton MD  12/14/21     Outpatient Encounter Medications as of 12/14/2021   Medication Sig Dispense Refill   • ascorbic acid (VITAMIN C) 1000 MG tablet Take 1,000 mg by mouth Daily.     • atorvastatin (LIPITOR) 40 MG tablet Take 40 mg by mouth Daily.     • carvedilol (COREG) 25 MG tablet Take 1 tablet by mouth Every 12 (Twelve) Hours. 60 tablet 2   • clonazePAM (KlonoPIN) 1 MG disintegrating tablet Take 2 mg by mouth Every Night.     • clopidogrel (PLAVIX) 75 MG tablet Take 75 mg by mouth Daily.     • furosemide (LASIX) 40 MG tablet Take 2 tablets by mouth Daily. 90 tablet 3   • gabapentin (NEURONTIN) 300 MG capsule Take 300 mg by mouth 3 (Three) Times a Day.     • glimepiride (AMARYL) 4 MG tablet Take 4 mg by mouth Daily.     • HYDROcodone-acetaminophen (NORCO) 5-325 MG per tablet Take  by mouth As Needed.     • ketoconazole (NIZORAL) 2 % shampoo      • lisinopril (PRINIVIL,ZESTRIL) 20 MG tablet Take 20 mg by mouth Daily.     • loratadine (CLARITIN) 10 MG tablet Take 10 mg by mouth Daily.     • metFORMIN (GLUCOPHAGE) 1000 MG tablet Take 1,000 mg by mouth 2 (Two) Times a Day With Meals.     • methocarbamol (ROBAXIN) 750 MG tablet Take  by mouth As Needed.     • potassium chloride (K-DUR,KLOR-CON) 10 MEQ CR tablet Take 10 mEq by mouth Daily.     • potassium chloride (MICRO-K) 8 MEQ CR capsule      • sertraline (ZOLOFT) 50 MG tablet Take 50 mg by mouth Daily.     • tamsulosin (FLOMAX) 0.4 MG capsule 24 hr capsule Take 1 capsule by mouth Every Night.     • vitamin B-12 (VITAMIN B-12) 1000 MCG tablet Take 1 tablet by mouth Daily. 30 tablet 0   • [DISCONTINUED] losartan-hydrochlorothiazide (HYZAAR) 100-25 MG per tablet Take 1 tablet by mouth Daily.     • fenofibrate (TRICOR) 145 MG tablet Take 1 tablet by mouth Daily. 90 tablet 3   • [DISCONTINUED] glimepiride (AMARYL) 2 MG tablet Take 2 mg  by mouth Every Morning Before Breakfast.     • [DISCONTINUED] lisinopril (PRINIVIL,ZESTRIL) 10 MG tablet Take 10 mg by mouth Daily.     • [DISCONTINUED] predniSONE (DELTASONE) 50 MG tablet Take one tablet 12 hours, 7 hours and 1 hour prior to CT scan with contrast. 3 tablet 0     No facility-administered encounter medications on file as of 12/14/2021.

## 2022-04-30 NOTE — DISCHARGE SUMMARY
PHYSICIAN DISCHARGE SUMMARY                                                                        [facility]    Patient Identification:  Name: Bird Gallegos  Age: 74 y.o.  Sex: male  :  1945  MRN: [id number]  Primary Care Physician: Mariya Lopez MD    Admit date: 2019  Discharge date and time:2019  Discharged Condition: good    Discharge Diagnoses:  Active Hospital Problems    Diagnosis  POA   • **Paresthesias [R20.2]  Yes   • Thalamic stroke (CMS/[facility]) [I63.9]  Unknown   • Morbid obesity with BMI of 40.0-44.9, adult (CMS/[facility]) [E66.01, Z68.41]  Not Applicable   • Diabetic neuropathy (CMS/[facility]) [E11.40]  Yes   • Type 2 diabetes mellitus, without long-term current use of insulin (CMS/[facility]) [E11.9]  Yes   • Hypertension [I10]  Yes   • Coronary artery disease involving native coronary artery of native heart [I25.10]  Yes   • Nonrheumatic aortic valve stenosis [I35.0]  Yes   • STEF on auto CPAP [G47.33, Z99.89]  Not Applicable      Resolved Hospital Problems   No resolved problems to display.          PMHX:   Past Medical History:   Diagnosis Date   • Anxiety and depression    • Aortic stenosis    • Arthritis    • Coronary artery disease    • Diabetes mellitus (CMS/[facility])    • Diabetic neuropathy (CMS/[facility])    • Diverticulosis    • COLVIN (dyspnea on exertion)    • GERD (gastroesophageal reflux disease)    • Heart murmur    • History of cellulitis     LEFT LEG   • Hypertension    • STEF (obstructive sleep apnea)    • STEF on auto CPAP      PSHX:   Past Surgical History:   Procedure Laterality Date   • BACK SURGERY      X2   • CARDIAC CATHETERIZATION N/A 3/21/2019    Procedure: Coronary angiography;  Surgeon: Wendy Braxton MD;  Location: [facility] CATH INVASIVE LOCATION;  Service: Cardiology   • CORONARY ARTERY BYPASS GRAFT WITH AORTIC VALVE REPAIR/REPLACEMENT N/A 3/26/2019    Procedure: INTRAOP QUIQUE; CORONARY ARTERY BYPASS  X 1 UTILIZING ENDOSCOPICALLY HARVESTED RIGHT GREATER SAPHENOUS VEIN; AORTIC VALVE REPLACEMENT AND PRP.;  Surgeon: Alexx Gandara MD;  Location: [facility];  Service: Cardiothoracic   • POSTERIOR CERVICAL FUSION     • TENDON REPAIR Left     KNEE   • WRIST SURGERY Bilateral        Hospital Course: Bird Gallegos  is a 74 y.o. former smoker with a history of aortic stenosis, CAD, diabetes, obesity and sleep apnea that presents to [facility] complaining of LUE paresthesia.  He was initially at [facility] today for a tooth extraction but this procedure was canceled as they checked his blood pressure and was approximately 200/120.  He was told to go to the ED and decided to present here as this is where he has had previous cardiac surgery.  He was asymptomatic at admission without chest pain, headache or paresthesias.  As the day has gone on he started having some numbness in his left arm.  He denies any facial asymmetry or slurring of his words.  At the time of my exam he is starting to feel some tingling sensation in both feet and both hands.  No ataxia.  He denies any chest pain, shortness of breath, nausea or vomiting.  No abdominal pain.  He reports that he does not add salt to his food but admits he possibly is not watching his sodium intake close enough.  He has chronic swelling in his lower extremities that he states was previously under control.  Review of office visit note from his cardiologist in July suggested he had 2+ edema at that time.  He reports compliance with his medications including his Coreg and Lasix.          The patient was admitted to the hospital for stroke work-up.  Patient was seen by cardiology and neurology.  Neurology felt the patient probably had thalamic infarct.  He seemed fairly stable and after being in the hospital for couple days looked well enough to go home.  He will be on aspirin and Plavix for 1 month and then go to Plavix alone.  He will be on  increased dose of Lipitor 40 mg daily for a month and then go back to his usual dose.  He will have a ZIO patch as outpatient to evaluate his rhythm.  He will follow-up with neurology clinic in 3 months.  He will follow-up with his primary care doctor in 1 week for continuing care.  He will also follow-up with cardiology in a couple weeks and follow-up with his primary care in 1 week for continuing care.    Consults:     Consults     Date and Time Order Name Status Description    11/14/2019 2235 Inpatient Cardiology Consult Completed     11/14/2019 2104 Inpatient Neurology Consult Stroke Completed     11/14/2019 1447 Inpatient Neurology Consult Stroke Completed     11/14/2019 1447 [facility] (on-call MD unless specified) Details Completed         Results from last 7 days   Lab Units 11/15/19  0525   WBC 10*3/mm3 8.27   HEMOGLOBIN g/dL 14.3   HEMATOCRIT % 44.3   PLATELETS 10*3/mm3 179     Results from last 7 days   Lab Units 11/15/19  0525   SODIUM mmol/L 142   POTASSIUM mmol/L 3.7   CHLORIDE mmol/L 103   CO2 mmol/L 27.6   BUN mg/dL 14   CREATININE mg/dL 0.94   GLUCOSE mg/dL 143*   CALCIUM mg/dL 8.8     Significant Diagnostic Studies:   Lab Results   Component Value Date    WBC 8.27 11/15/2019    HGB 14.3 11/15/2019    HCT 44.3 11/15/2019     11/15/2019     Lab Results   Component Value Date     11/15/2019    K 3.7 11/15/2019     11/15/2019    CO2 27.6 11/15/2019    BUN 14 11/15/2019    CREATININE 0.94 11/15/2019    GLUCOSE 143 (H) 11/15/2019     Lab Results   Component Value Date    CALCIUM 8.8 11/15/2019     Lab Results   Component Value Date    AST 17 11/15/2019    ALT 16 11/15/2019    ALKPHOS 59 11/15/2019     No results found for: APTT, INR  Lab Results   Component Value Date    COLORU Yellow 11/15/2019    CLARITYU Clear 11/15/2019    PHUR 7.5 11/15/2019    GLUCOSEU Negative 11/15/2019    KETONESU Negative 11/15/2019    BLOODU Negative 11/15/2019    LEUKOCYTESUR Negative 11/15/2019    BILIRUBINUR  Negative 11/15/2019    UROBILINOGEN 0.2 E.U./dL 11/15/2019     No results found for: TROPONINT, TROPONINI, BNP  No components found for: HGBA1C;2  No components found for: TSH;2  Imaging Results (All)     Procedure Component Value Units Date/Time    MRI Brain Without Contrast [[id number]] Collected:  11/15/19 0929     Updated:  11/15/19 1314    Narrative:       MRI OF THE BRAIN WITHOUT CONTRAST     CLINICAL HISTORY: Dizziness, left-sided numbness, blurred vision.  Headache.     TECHNIQUE: MRI of the brain was obtained with sagittal T1, axial T1,  axial FLAIR, axial T2, axial diffusion, and axial gradient echo images.     FINDINGS:     The ventricles, sulci, and cisterns are age appropriate. The midline  intracranial anatomy is unremarkable. There are no abnormal areas of  restricted diffusion. Mild changes of chronic small vessel ischemic  phenomena are noted. The major intracranial flow related signal voids  are within normal limits.     Postsurgical changes are incidentally noted within the visualized upper  cervical spine. Mild mucosal thickening is appreciated within the  ethmoid air cells. Also, incidental note is made of a trace right  mastoid effusion.       Impression:          There is no evidence for acute intracranial pathology. Mild changes of  chronic small vessel ischemic phenomena are noted.     Mild changes of inflammatory paranasal sinus disease are incidentally  noted. Incidental note is also made of a trace right mastoid effusion.     This report was finalized on 11/15/2019 1:11 PM by Dr. Max Sánchez M.D.       CT Head Without Contrast [[id number]] Collected:  11/14/19 1505     Updated:  11/14/19 1552    Narrative:       EMERGENCY NONCONTRAST HEAD CT 11/14/2019     CLINICAL HISTORY: Dizziness, left-sided paresthesias, headache and  hypertension.     TECHNIQUE: Spiral CT images were obtained from the base of the skull to  the vertex without intravenous contrast. Images were reformatted  and  submitted in 3 mm thick axial CT sections with brain algorithm and 2 mm  thick sagittal and coronal reconstructions were performed and submitted  in brain algorithm.     No prior studies from [facility] for comparison.     FINDINGS: There is some mild low density and periventricular white  matter consistent with mild small vessel disease. The remainder of the  brain parenchyma is normal in attenuation. The ventricles are normal in  size. I see no focal mass effect and no midline shift and no extra-axial  fluid collections are identified and there is no evidence of acute  intracranial hemorrhage. The paranasal sinuses and mastoid air cells and  middle ear cavities are clear; the calvarium and skull base are normal  in appearance.  Calcified plaques are present in the intracranial  segment of the distal left vertebral artery, cavernous segments of  internal carotid arteries bilaterally.       Impression:       1. There is mild small vessel disease in the cerebral white matter and  there are calcified atherosclerotic plaques in the intracranial segments  of the distal left vertebral artery and cavernous segments of the  internal carotid arteries, bilaterally. The remainder of the head CT is  within normal limits. The etiology of the left arm and facial  paresthesias and dizziness are not established on this exam.  If  symptoms persist and one remains at all concerned about acute stroke, I  recommended MRI of the head for more complete assessment.     The result and recommendations were discussed with Dr. Ferrera from the  emergency room by telephone 11/14/2019 at 2:30 PM.      Radiation dose reduction techniques were utilized, including automated  exposure control and exposure modulation based on body size.     This report was finalized on 11/14/2019 3:49 PM by Dr. Vivek Liao M.D.           Lab Results (last 7 days)     Procedure Component Value Units Date/Time    POC Glucose Once [[id number]]   (Normal) Collected:  11/16/19 1624    Specimen:  Blood Updated:  11/16/19 1646     Glucose 103 mg/dL     POC Glucose Once [[id number]]  (Abnormal) Collected:  11/16/19 1132    Specimen:  Blood Updated:  11/16/19 1138     Glucose 202 mg/dL     POC Glucose Once [[id number]]  (Abnormal) Collected:  11/16/19 0803    Specimen:  Blood Updated:  11/16/19 0804     Glucose 136 mg/dL     POC Glucose Once [[id number]]  (Normal) Collected:  11/15/19 2137    Specimen:  Blood Updated:  11/15/19 2141     Glucose 110 mg/dL     POC Glucose Once [[id number]]  (Normal) Collected:  11/15/19 1708    Specimen:  Blood Updated:  11/15/19 1716     Glucose 104 mg/dL     POC Glucose Once [[id number]]  (Abnormal) Collected:  11/15/19 1109    Specimen:  Blood Updated:  11/15/19 1111     Glucose 171 mg/dL     POC Glucose Once [[id number]]  (Abnormal) Collected:  11/15/19 0735    Specimen:  Blood Updated:  11/15/19 0738     Glucose 133 mg/dL     Vitamin B12 [[id number]]  (Normal) Collected:  11/15/19 0525    Specimen:  Blood Updated:  11/15/19 0642     Vitamin B-12 249 pg/mL     TSH [[id number]]  (Normal) Collected:  11/15/19 0525    Specimen:  Blood Updated:  11/15/19 0639     TSH 3.390 uIU/mL     BNP [[id number]]  (Normal) Collected:  11/15/19 0525    Specimen:  Blood Updated:  11/15/19 0638     proBNP 725.2 pg/mL     Narrative:       Among patients with dyspnea, NT-proBNP is highly sensitive for the detection of acute congestive heart failure. In addition NT-proBNP of <300 pg/ml effectively rules out acute congestive heart failure with 99% negative predictive value.    Comprehensive Metabolic Panel [[id number]]  (Abnormal) Collected:  11/15/19 0525    Specimen:  Blood Updated:  11/15/19 0633     Glucose 143 mg/dL      BUN 14 mg/dL      Creatinine 0.94 mg/dL      Sodium 142 mmol/L      Potassium 3.7 mmol/L      Chloride 103 mmol/L      CO2 27.6 mmol/L      Calcium 8.8 mg/dL      Total Protein 6.6 g/dL      Albumin 3.60 g/dL      ALT (SGPT) 16 U/L       AST (SGOT) 17 U/L      Alkaline Phosphatase 59 U/L      Total Bilirubin 0.8 mg/dL      eGFR Non African Amer 78 mL/min/1.73      Globulin 3.0 gm/dL      A/G Ratio 1.2 g/dL      BUN/Creatinine Ratio 14.9     Anion Gap 11.4 mmol/L     Narrative:       GFR Normal >60  Chronic Kidney Disease <60  Kidney Failure <15    Lipid Panel [[id number]]  (Abnormal) Collected:  11/15/19 0525    Specimen:  Blood Updated:  11/15/19 0633     Total Cholesterol 130 mg/dL      Triglycerides 215 mg/dL      HDL Cholesterol 27 mg/dL      LDL Cholesterol  60 mg/dL      VLDL Cholesterol 43 mg/dL      LDL/HDL Ratio 2.22    Narrative:       Cholesterol Reference Ranges  (U.S. Department of Health and Human Services ATP III Classifications)    Desirable          <200 mg/dL  Borderline High    200-239 mg/dL  High Risk          >240 mg/dL      Triglyceride Reference Ranges  (U.S. Department of Health and Human Services ATP III Classifications)    Normal           <150 mg/dL  Borderline High  150-199 mg/dL  High             200-499 mg/dL  Very High        >500 mg/dL    HDL Reference Ranges  (U.S. Department of Health and Human Services ATP III Classifcations)    Low     <40 mg/dl (major risk factor for CHD)  High    >60 mg/dl ('negative' risk factor for CHD)        LDL Reference Ranges  (U.S. Department of Health and Human Services ATP III Classifcations)    Optimal          <100 mg/dL  Near Optimal     100-129 mg/dL  Borderline High  130-159 mg/dL  High             160-189 mg/dL  Very High        >189 mg/dL    Hemoglobin A1c [[id number]]  (Abnormal) Collected:  11/15/19 0525    Specimen:  Blood Updated:  11/15/19 0554     Hemoglobin A1C 6.50 %     Narrative:       Hemoglobin A1C Ranges:    Increased Risk for Diabetes  5.7% to 6.4%  Diabetes                     >= 6.5%  Diabetic Goal                < 7.0%    CBC (No Diff) [[id number]]  (Normal) Collected:  11/15/19 0525    Specimen:  Blood Updated:  11/15/19 0549     WBC 8.27 10*3/mm3      RBC 5.17  10*6/mm3      Hemoglobin 14.3 g/dL      Hematocrit 44.3 %      MCV 85.7 fL      MCH 27.7 pg      MCHC 32.3 g/dL      RDW 14.6 %      RDW-SD 46.4 fl      MPV 10.6 fL      Platelets 179 10*3/mm3     Urinalysis With Culture If Indicated - Urine, Clean Catch [[id number]]  (Normal) Collected:  11/15/19 0214    Specimen:  Urine, Clean Catch Updated:  11/15/19 0240     Color, UA Yellow     Appearance, UA Clear     pH, UA 7.5     Specific Gravity, UA 1.014     Glucose, UA Negative     Ketones, UA Negative     Bilirubin, UA Negative     Blood, UA Negative     Protein, UA Negative     Leuk Esterase, UA Negative     Nitrite, UA Negative     Urobilinogen, UA 0.2 E.U./dL    Narrative:       Urine microscopic not indicated.    POC Glucose Once [[id number]]  (Normal) Collected:  11/14/19 1533    Specimen:  Blood Updated:  11/14/19 1535     Glucose 119 mg/dL     Comprehensive Metabolic Panel [[id number]]  (Abnormal) Collected:  11/14/19 1208    Specimen:  Blood Updated:  11/14/19 1242     Glucose 158 mg/dL      BUN 16 mg/dL      Creatinine 0.95 mg/dL      Sodium 140 mmol/L      Potassium 4.3 mmol/L      Chloride 101 mmol/L      CO2 25.2 mmol/L      Calcium 9.3 mg/dL      Total Protein 7.7 g/dL      Albumin 4.30 g/dL      ALT (SGPT) 16 U/L      AST (SGOT) 19 U/L      Alkaline Phosphatase 78 U/L      Total Bilirubin 0.6 mg/dL      eGFR Non African Amer 77 mL/min/1.73      Globulin 3.4 gm/dL      A/G Ratio 1.3 g/dL      BUN/Creatinine Ratio 16.8     Anion Gap 13.8 mmol/L     Narrative:       GFR Normal >60  Chronic Kidney Disease <60  Kidney Failure <15    CBC & Differential [[id number]] Collected:  11/14/19 1208    Specimen:  Blood Updated:  11/14/19 1224    Narrative:       The following orders were created for panel order CBC & Differential.  Procedure                               Abnormality         Status                     ---------                               -----------         ------                     CBC Auto  "Differential[[id number]]        Normal              Final result                 Please view results for these tests on the individual orders.    CBC Auto Differential [[id number]]  (Normal) Collected:  11/14/19 1208    Specimen:  Blood Updated:  11/14/19 1224     WBC 7.41 10*3/mm3      RBC 5.31 10*6/mm3      Hemoglobin 15.6 g/dL      Hematocrit 45.6 %      MCV 85.9 fL      MCH 29.4 pg      MCHC 34.2 g/dL      RDW 14.5 %      RDW-SD 44.9 fl      MPV 10.6 fL      Platelets 177 10*3/mm3      Neutrophil % 66.2 %      Lymphocyte % 19.8 %      Monocyte % 7.8 %      Eosinophil % 5.0 %      Basophil % 0.7 %      Immature Grans % 0.5 %      Neutrophils, Absolute 4.90 10*3/mm3      Lymphocytes, Absolute 1.47 10*3/mm3      Monocytes, Absolute 0.58 10*3/mm3      Eosinophils, Absolute 0.37 10*3/mm3      Basophils, Absolute 0.05 10*3/mm3      Immature Grans, Absolute 0.04 10*3/mm3      nRBC 0.0 /100 WBC         /88 (BP Location: Right arm, Patient Position: Sitting)   Pulse 82   Temp 97.5 °F (36.4 °C) (Oral)   Resp 18   Ht 172.7 cm (68\")   Wt 127 kg (279 lb)   SpO2 96%   BMI 42.42 kg/m²     Discharge Exam:  General Appearance:    Alert, cooperative, no distress                          Head:    Normocephalic, without obvious abnormality, atraumatic                          Eyes:                            Throat:   Lips, tongue, gums normal                          Neck:   Supple, symmetrical, trachea midline, no JVD                        Lungs:     Clear to auscultation bilaterally, respirations unlabored                Chest Wall:    No tenderness or deformity                        Heart:    Regular rate and rhythm, S1 and S2 normal, no murmur,no  Rub or gallop                  Abdomen:     Soft, non-tender, bowel sounds active, no masses, no organomegaly                  Extremities:   Extremities normal, atraumatic, no cyanosis or edema                             Skin:   Skin is warm and dry,  no rashes or " palpable lesions                  Neurologic:   no focal deficits noted     Disposition:  Home    Patient Instructions:      Discharge Medications      New Medications      Instructions Start Date   clopidogrel 75 MG tablet  Commonly known as:  PLAVIX   75 mg, Oral, Daily   Start Date:  11/17/2019     cyanocobalamin 1000 MCG tablet  Commonly known as:  VITAMIN B-12   1,000 mcg, Oral, Daily   Start Date:  11/17/2019     lisinopril 10 MG tablet  Commonly known as:  PRINIVIL,ZESTRIL   10 mg, Oral, Every 24 Hours Scheduled   Start Date:  11/17/2019        Changes to Medications      Instructions Start Date   atorvastatin 40 MG tablet  Commonly known as:  LIPITOR  What changed:    · medication strength  · how much to take  · when to take this   40 mg, Oral, Nightly         Continue These Medications      Instructions Start Date   ascorbic acid 1000 MG tablet  Commonly known as:  VITAMIN C   1,000 mg, Oral, Daily      BABY ASPIRIN PO   81 mg, Oral, Daily      carvedilol 25 MG tablet  Commonly known as:  COREG   25 mg, Oral, Every 12 Hours      clonazePAM 1 MG disintegrating tablet  Commonly known as:  KlonoPIN   2 mg, Oral, Nightly      furosemide 40 MG tablet  Commonly known as:  LASIX   40 mg, Oral, Daily      gabapentin 300 MG capsule  Commonly known as:  NEURONTIN   300 mg, Oral, 3 Times Daily      glimepiride 2 MG tablet  Commonly known as:  AMARYL   2 mg, Oral, Every Morning Before Breakfast      loratadine 10 MG tablet  Commonly known as:  CLARITIN   10 mg, Oral, Daily      metFORMIN 1000 MG tablet  Commonly known as:  GLUCOPHAGE   1,000 mg, Oral, 2 Times Daily With Meals      potassium chloride 10 MEQ CR tablet  Commonly known as:  K-DUR,KLOR-CON   10 mEq, Oral, Daily      sertraline 50 MG tablet  Commonly known as:  ZOLOFT   50 mg, Oral, Daily      tamsulosin 0.4 MG capsule 24 hr capsule  Commonly known as:  FLOMAX   1 capsule, Oral, Nightly           Future Appointments   Date Time Provider Department Center    1/28/2020 10:15 AM Wendy Braxton MD [facility] None   3/12/2020  8:30 AM Jensen Mas MD [facility] None     Follow-up Information     Zuleyma Arellano APRN Follow up in 3 month(s).    Specialties:  Nurse Practitioner, Neurology  Contact information:  [address]  [place] [facility] [address] [phone]             Wendy Braxton MD Follow up.    Specialty:  Cardiology  Why:  F/u with Dr. Braxton or her Nurse Practitioner in 1-2 weeks.    Contact information:  [address]  [address]  [facility] [id number] [phone]             Mariya Lopez MD .    Specialty:  Internal Medicine  Contact information:  [address]  [facility] [id number] [phone]                 Discharge Order (From admission, onward)    Start     Ordered    11/16/19 1743  Discharge patient  Once     Expected Discharge Date:  11/16/19    Discharge Disposition:  Home or Self Care    Physician of Record for Attribution - Please select from Treatment Team:  ALFREDO WELCH [[id number]]    Review needed by CMO to determine Physician of Record:  No       Question Answer Comment   Physician of Record for Attribution - Please select from Treatment Team ALFREDO WELCH    Review needed by CMO to determine Physician of Record No        11/16/19 1756          Total time spent discharging patient including evaluation,post hospitalization follow up,  medication and post hospitalization instructions and education total time exceeds 30 minutes.    Signed:  Alfredo Welch MD  11/16/2019  5:54 PM     10

## 2022-05-12 ENCOUNTER — OFFICE VISIT (OUTPATIENT)
Dept: SLEEP MEDICINE | Facility: HOSPITAL | Age: 77
End: 2022-05-12

## 2022-05-12 VITALS — WEIGHT: 277 LBS | BODY MASS INDEX: 41.98 KG/M2 | HEART RATE: 89 BPM | HEIGHT: 68 IN | OXYGEN SATURATION: 97 %

## 2022-05-12 DIAGNOSIS — G47.33 OSA ON CPAP: Primary | ICD-10-CM

## 2022-05-12 DIAGNOSIS — E66.01 CLASS 3 SEVERE OBESITY DUE TO EXCESS CALORIES WITH SERIOUS COMORBIDITY AND BODY MASS INDEX (BMI) OF 40.0 TO 44.9 IN ADULT: ICD-10-CM

## 2022-05-12 DIAGNOSIS — Z99.89 OSA ON CPAP: Primary | ICD-10-CM

## 2022-05-12 PROCEDURE — 99213 OFFICE O/P EST LOW 20 MIN: CPT | Performed by: INTERNAL MEDICINE

## 2022-05-12 PROCEDURE — G0463 HOSPITAL OUTPT CLINIC VISIT: HCPCS

## 2022-05-12 NOTE — PROGRESS NOTES
"[facility] Note     Chief Complaint:  STEF     Primary Care Physician: Mariya Lopez MD    Interval History:   The patient is a 77 y.o. male  who I last saw 2021 and that note was reviewed.  The patient reports he is stable without new complaints.  He goes to bed at 10 PM and awakens at 8 AM.  He will use the restroom during that time.    Due to the Elliot recall, the patient is not using his new device but he is using his old device that is set on CPAP +16.    Self-administered [place] Sleepiness Scale test results: 2  0-5 Lower normal daytime sleepiness  6-10 Higher normal daytime sleepiness  11-12 Mild, 13-15 Moderate, & 16-24 Severe excessive daytime sleepiness    Review of Systems:    A complete review of systems was done and all were negative with the exception of some shortness of breath with exertion and occasional wheezing and some anxiety    Social History:    Social History     Socioeconomic History   • Marital status:    Tobacco Use   • Smoking status: Former Smoker     Packs/day: 0.50     Types: Cigarettes     Quit date:      Years since quittin.3   • Smokeless tobacco: Never Used   • Tobacco comment: Smoked between the ages of 25 and 40.   Substance and Sexual Activity   • Alcohol use: No   • Drug use: No   • Sexual activity: Defer       Allergies:  Contrast dye and Latex     Medication Review:  Reviewed.      Vital Signs:    Vitals:    22 1129   Pulse: 89   SpO2: 97%   Weight: 126 kg (277 lb)   Height: 172.7 cm (68\")     Body mass index is 42.12 kg/m².    Physical Exam:    Constitutional:  Well developed 77 y.o. male that appears in no apparent distress.  Awake & oriented times 3.  Normal mood with normal recent and remote memory and normal judgement.  Eyes:  Conjunctivae normal.  Oropharynx: Previously, moist mucous membranes without exudate and a large tongue and uvula and narrow posterior pharyngeal opening and class II-3 Mallampati " airway, patient is wearing a facemask.     Downloaded PAP Data Reviewed For Compliance:  DME is [facility] and he uses a fullface mask.  Downloads between 2/11 and 5/11/2022 compliance 100%.  Average usage is 8 hours and 53 minutes.  Average AHI is not provided on his present device.  CPAP pressure is +16.    I have reviewed the above results and compared them with the patient's last downloads and reviewed with the patient.    Impression:   Obstructive sleep apnea adequately treated with CPAP +16, old device. The patient appears to be at goal with good compliance and usage. The patient has no complaints of hypersomnolence.    Plan:  Good sleep hygiene measures should be maintained.  Weight loss would be beneficial in this patient who has class III severe obesity by BMI.      After evaluating the patient and assessing results available, the patient is benefiting from the treatment being provided.     The patient will continue CPAP +16.  After clinical evaluation and review of downloads, I recommend no changes to the patient's pressures.  A new prescription will be sent to the patient's DME.    Just to be sure, I have asked the patient to check the serial number of his old device to make sure it is not on the recall list.    I answered all of the patient's questions.  The patient will call for any problems and will follow up in 1 year.      Jensen Mas MD  Sleep Medicine  05/12/22  11:54 EDT

## 2022-06-14 ENCOUNTER — OFFICE VISIT (OUTPATIENT)
Dept: CARDIOLOGY | Facility: CLINIC | Age: 77
End: 2022-06-14

## 2022-06-14 VITALS
WEIGHT: 269 LBS | BODY MASS INDEX: 40.77 KG/M2 | DIASTOLIC BLOOD PRESSURE: 84 MMHG | SYSTOLIC BLOOD PRESSURE: 148 MMHG | HEIGHT: 68 IN | HEART RATE: 68 BPM

## 2022-06-14 DIAGNOSIS — I25.10 CORONARY ARTERY DISEASE INVOLVING NATIVE CORONARY ARTERY OF NATIVE HEART WITHOUT ANGINA PECTORIS: Primary | ICD-10-CM

## 2022-06-14 PROCEDURE — 99213 OFFICE O/P EST LOW 20 MIN: CPT | Performed by: INTERNAL MEDICINE

## 2022-06-14 RX ORDER — OLOPATADINE HYDROCHLORIDE 1 MG/ML
SOLUTION/ DROPS OPHTHALMIC DAILY
COMMUNITY
Start: 2022-05-12

## 2022-06-14 RX ORDER — ERYTHROMYCIN 5 MG/G
OINTMENT OPHTHALMIC
COMMUNITY
Start: 2022-05-24

## 2022-06-14 NOTE — PROGRESS NOTES
Subjective:     Encounter Date: 06/14/22      Patient ID: Bird Gallegos is a 77 y.o. male.    Chief Complaint: CAD, AVR, CVA    This is a 77-year-old man with past medical history of CAD, AS s/p bioprothetic AVR obstructive sleep apnea, diabetes on oral medications, hypertension.      I originally met him in March 2019 around the time his  primary care physician noted a significant murmur.   He had an echocardiogram at [facility] which showed severe aortic stenosis with a mean gradient of 45 mmHg and a peak velocity of 4.5 m/s squared, with a calculated KELSEA of 0.51 cm².  He underwent left heart catheterization as an outpatient which showed single-vessel coronary disease of the OM 2.  Given coronary disease and severe aortic stenosis, the surgeons performed traditional surgical AVR with single-vessel vein graft bypass to the OM.      He did quite well until November 2019 when he was diagnosed with a thalamic stroke due to severe hypertension. He was treated with DAPT x 30 days and is now on Plavix. His ZIO patch showed no atrial arrhythmias.     Today he returns.  No angina. Feeling well. Has lost a few pounds. Is walking with his dog twice a day. Edema is stable.     He is a former .  He quit smoking 30 years ago.  He is a recent .  His daughter is a former nurse.  He has a new border collie dog who he has been walking with.    REVIEW OF SYSTEMS:   All systems reviewed.  Pertinent positives identified in HPI.  All other systems are negative.    The following portions of the patient's history were reviewed and updated as appropriate: allergies, current medications, past family history, past medical history, past social history, past surgical history and problem list.    Past Medical History:   Diagnosis Date   • Anxiety and depression    • Aortic stenosis    • Arthritis    • Coronary artery disease    • CTS (carpal tunnel syndrome)    • Degenerative joint disease (DJD) of lumbar spine    • Diabetes  mellitus (HCC)    • Diabetic neuropathy (HCC)    • Difficulty walking    • Diverticulosis    • COLVIN (dyspnea on exertion)    • GERD (gastroesophageal reflux disease)    • Headache, tension-type    • Heart murmur    • History of cellulitis     LEFT LEG   • HL (hearing loss)    • Hypertension    • STEF (obstructive sleep apnea)    • STEF on auto CPAP    • Stroke (HCC)    • Vision loss        Family History   Problem Relation Age of Onset   • Heart disease Mother    • Arthritis Mother    • Cancer Father    • Hypertension Brother    • Diabetes Brother    • Hypertension Brother    • Parkinsonism Sister    • Arthritis Sister    • Heart disease Maternal Grandmother    • Heart disease Maternal Grandfather    • Heart disease Paternal Grandmother    • Malig Hyperthermia Neg Hx        Social History     Socioeconomic History   • Marital status:    Tobacco Use   • Smoking status: Former Smoker     Packs/day: 0.50     Types: Cigarettes     Quit date:      Years since quittin.4   • Smokeless tobacco: Never Used   • Tobacco comment: Smoked between the ages of 25 and 40.   Substance and Sexual Activity   • Alcohol use: No   • Drug use: No   • Sexual activity: Defer       Allergies   Allergen Reactions   • Contrast Dye Hives     Hives and rash reported   • Iodine Unknown - Low Severity   • Latex Hives       Past Surgical History:   Procedure Laterality Date   • BACK SURGERY      X2   • CARDIAC CATHETERIZATION N/A 3/21/2019    Procedure: Coronary angiography;  Surgeon: Wendy Braxton MD;  Location: [facility] INVASIVE LOCATION;  Service: Cardiology   • CORONARY ARTERY BYPASS GRAFT WITH AORTIC VALVE REPAIR/REPLACEMENT N/A 3/26/2019    Procedure: INTRAOP QUIQUE; CORONARY ARTERY BYPASS X 1 UTILIZING ENDOSCOPICALLY HARVESTED RIGHT GREATER SAPHENOUS VEIN; AORTIC VALVE REPLACEMENT AND PRP.;  Surgeon: Alexx Gandara MD;  Location: [facility] OR;  Service: Cardiothoracic   • POSTERIOR CERVICAL FUSION     • TENDON REPAIR Left      KNEE   • WRIST SURGERY Bilateral        Procedures       Objective:     Physical Exam  GEN: no distress, alert and oriented  Eyes: normal sclera, normal lids and lashes  HENT: moist mucus membranes,   Lungs CTAB, no rales or wheezes  Chest: no abnormalities  Neck: Too large to evaluate JVD.  CV: RRR, left ear murmur has resolved  Abdo: Obese soft,  Nontender, nondistended. No pain on deep palpation but has pain when laying back  Extr: +2 bl edema, stable  Skin: no rash, warm, dry  Heme/Lymph: no bruising  Psych: organized thought, normal behavior and affect  Neuro: decreased sensation LUE/hand, adequate 4/4 strength in             Assessment:          Diagnosis Plan   1. Coronary artery disease involving native coronary artery of native heart without angina pectoris                Plan:         This is a 77-year-old man with prior severe AS and singlevessel CAD, s/p  SAVR with SVG to OM2     1. Aortic Stenosis s/p Bioprosthetic AVR, stable  2 . CAD, no angina, s/p SVG to OM1.  Plavix.   3. CVA:November 2019- thalamic stroke with residual LUE paresthesias/ sensory deficit. ZIO patch shows no AF. Related to severe hypertension.   4. HTN- BP is well controlled, lisinopril 20, coreg 25. He takes Lasix 40.   5. HLD: TG still elevated >300 Start Tricor 145 daily  6. Diastolic dysfunction: LE edema is likely multifactorial including HFpEF and obesity.  7. LE pain, mild abnormalitiy in RALPH.   8. Bronchitis- asked to discuss with Dr. Meena Lopez, thank you for referring this kind patient tot me. I will see him again in 6 months.  Please call with any questions or concerns.    Wendy Braxton MD  06/14/22     Outpatient Encounter Medications as of 6/14/2022   Medication Sig Dispense Refill   • ascorbic acid (VITAMIN C) 1000 MG tablet Take 1,000 mg by mouth Daily.     • atorvastatin (LIPITOR) 40 MG tablet Take 40 mg by mouth Daily.     • carvedilol (COREG) 25 MG tablet Take 1 tablet by mouth Every 12 (Twelve)  Hours. 60 tablet 2   • clonazePAM (KlonoPIN) 1 MG disintegrating tablet Take 2 mg by mouth Every Night.     • clopidogrel (PLAVIX) 75 MG tablet Take 75 mg by mouth Daily.     • erythromycin (ROMYCIN) 5 MG/GM ophthalmic ointment apply (1CM) by ophthalmic route every day ribbon into the lower conjunctival sac(s) in the affected eye(s)     • fenofibrate (TRICOR) 145 MG tablet Take 1 tablet by mouth Daily. 90 tablet 3   • furosemide (LASIX) 40 MG tablet Take 2 tablets by mouth Daily. 90 tablet 3   • gabapentin (NEURONTIN) 300 MG capsule Take 300 mg by mouth 3 (Three) Times a Day.     • HYDROcodone-acetaminophen (NORCO) 5-325 MG per tablet Take  by mouth As Needed.     • ketoconazole (NIZORAL) 2 % shampoo      • lisinopril (PRINIVIL,ZESTRIL) 20 MG tablet Take 20 mg by mouth Daily.     • loratadine (CLARITIN) 10 MG tablet Take 10 mg by mouth Daily.     • metFORMIN (GLUCOPHAGE) 1000 MG tablet Take 1,000 mg by mouth 2 (Two) Times a Day With Meals.     • methocarbamol (ROBAXIN) 750 MG tablet Take  by mouth As Needed.     • olopatadine (PATANOL) 0.1 % ophthalmic solution Apply  to eye(s) as directed by provider Daily.     • potassium chloride (K-DUR,KLOR-CON) 10 MEQ CR tablet Take 10 mEq by mouth Daily.     • sertraline (ZOLOFT) 50 MG tablet Take 50 mg by mouth Daily.     • tamsulosin (FLOMAX) 0.4 MG capsule 24 hr capsule Take 1 capsule by mouth Every Night.     • vitamin B-12 (VITAMIN B-12) 1000 MCG tablet Take 1 tablet by mouth Daily. 30 tablet 0   • potassium chloride (MICRO-K) 8 MEQ CR capsule        No facility-administered encounter medications on file as of 6/14/2022.

## 2022-08-31 RX ORDER — FENOFIBRATE 145 MG/1
TABLET, COATED ORAL
Qty: 90 TABLET | Refills: 3 | Status: SHIPPED | OUTPATIENT
Start: 2022-08-31

## 2023-02-06 ENCOUNTER — TELEPHONE (OUTPATIENT)
Dept: SLEEP MEDICINE | Facility: HOSPITAL | Age: 78
End: 2023-02-06
Payer: MEDICARE

## 2023-02-06 NOTE — TELEPHONE ENCOUNTER
Messaged Dr. Mas to request Elliot order.  Patient is contacting Elliot to retrieve his Confirmation number.

## 2023-03-15 ENCOUNTER — TRANSCRIBE ORDERS (OUTPATIENT)
Dept: PHYSICAL THERAPY | Facility: CLINIC | Age: 78
End: 2023-03-15
Payer: MEDICARE

## 2023-03-15 DIAGNOSIS — G89.29 CHRONIC NECK PAIN: ICD-10-CM

## 2023-03-15 DIAGNOSIS — G89.29 CHRONIC MIDLINE THORACIC BACK PAIN: ICD-10-CM

## 2023-03-15 DIAGNOSIS — M54.16 RADICULOPATHY, LUMBAR REGION: ICD-10-CM

## 2023-03-15 DIAGNOSIS — M54.6 CHRONIC MIDLINE THORACIC BACK PAIN: ICD-10-CM

## 2023-03-15 DIAGNOSIS — M54.12 RADICULOPATHY, CERVICAL: ICD-10-CM

## 2023-03-15 DIAGNOSIS — Z98.1 S/P CERVICAL SPINAL FUSION: Primary | ICD-10-CM

## 2023-03-15 DIAGNOSIS — M54.50 CHRONIC MIDLINE LOW BACK PAIN WITHOUT SCIATICA: ICD-10-CM

## 2023-03-15 DIAGNOSIS — M54.2 CHRONIC NECK PAIN: ICD-10-CM

## 2023-03-15 DIAGNOSIS — G89.29 CHRONIC MIDLINE LOW BACK PAIN WITHOUT SCIATICA: ICD-10-CM

## 2023-03-29 ENCOUNTER — TREATMENT (OUTPATIENT)
Dept: PHYSICAL THERAPY | Facility: CLINIC | Age: 78
End: 2023-03-29
Payer: MEDICARE

## 2023-03-29 DIAGNOSIS — M54.12 RADICULOPATHY, CERVICAL: ICD-10-CM

## 2023-03-29 DIAGNOSIS — Z98.1 S/P CERVICAL SPINAL FUSION: ICD-10-CM

## 2023-03-29 DIAGNOSIS — G89.29 CHRONIC MIDLINE LOW BACK PAIN WITHOUT SCIATICA: Primary | ICD-10-CM

## 2023-03-29 DIAGNOSIS — G89.29 CHRONIC MIDLINE THORACIC BACK PAIN: ICD-10-CM

## 2023-03-29 DIAGNOSIS — M54.6 CHRONIC MIDLINE THORACIC BACK PAIN: ICD-10-CM

## 2023-03-29 DIAGNOSIS — M54.16 RADICULOPATHY, LUMBAR REGION: ICD-10-CM

## 2023-03-29 DIAGNOSIS — G89.29 CHRONIC NECK PAIN: ICD-10-CM

## 2023-03-29 DIAGNOSIS — M54.2 CHRONIC NECK PAIN: ICD-10-CM

## 2023-03-29 DIAGNOSIS — M54.50 CHRONIC MIDLINE LOW BACK PAIN WITHOUT SCIATICA: Primary | ICD-10-CM

## 2023-03-29 PROCEDURE — 97162 PT EVAL MOD COMPLEX 30 MIN: CPT | Performed by: PHYSICAL THERAPIST

## 2023-03-29 PROCEDURE — 97530 THERAPEUTIC ACTIVITIES: CPT | Performed by: PHYSICAL THERAPIST

## 2023-03-29 NOTE — PROGRESS NOTES
[facility]    Physical Therapy Initial Evaluation and Plan of Care    Patient Name: Bird Gallegos          :  1945  Referring Physician: Ning Robledo MD  Diagnosis: Chronic midline low back pain without sciatica [M54.50, G89.29]   Date of Evaluation: 3/28/2023  ______________________________________________________________________    Subjective Evaluation    History of Present Illness  Mechanism of injury: Multiple Cervical / lumbar surgeries including C-fusion (5-6 yrs ago - )C2-5;  spondylolisthesis at L5-S1.  Total of 3 total surgeries -     Pain: LBP L>R;  Pain in (L) shoulder - LE(B) legs - Left leg burns in antrerior thigh; (B) calves; lateral (R) thigh;   Legs feel weak - give out on him; Feels like he is going to loose his balance -   Uses electric cart for the last year -due to progressive loss of strength, mobility, function-   Pain increased Standing/  Can only walk w/ cane 50ft;  W/o cane < 10 feet   Only able to stand: 5 min until LBP and LE symptoms;   Stairs to basement - hae to do laundry - has to crawl up the steps -   Hands ar numb (diffusely hand/fingers ) weak  progressively worsening;   Diabetic Type II -   Has neuropathy -             Patient Occupation: retired - Used to like to fish and drag race -  Quality of life: fair    Pain  Pain scale: 0 for back / legs; 5/10 Hands)  Pain scale at highest: LB/LE 9/10.  Location: Chronic spine pain (back of head to buttock);  LEs numb; Restless leg  Quality: Throbbing pain   Exacerbated by: Standing , walking,     Social Support  Patient lives at: home w/ basement stairs - Ramp to get in house out back.  Lives with: alone    Treatments  Previous treatment: physical therapy, medication and injection treatment (PT for spine/LEs in past most recently 7-8 yrs ago )  Patient Goals  Patient/family treatment goals: Be able to walk for exercise ; Be more active lifestyle ; be more active with his family -           ___________________________________________________  Objective          Postural Observations    Additional Postural Observation Details  Pt arrived in electric scooter - Pt ambulates w/ trendelenburg gait LLE  UEs in guarded posturing w/ HE, elbow flexion, abduction; Drop foot? (L), wide base gait -   Scoliosis lower T/upper L to (R) and (L) at LSS    Tenderness     Additional Tenderness Details  Diffusely tender L3-S1 central and associated soft tissue L>R - Tender over L>R SI jt regions -     Active Range of Motion     Additional Active Range of Motion Details  Lumbar spine: Flexion 3/4 norm;  Extension 1/3 norm w/ pain;  SB (B) Limited by 1/3 w/ pain;      Strength/Myotome Testing     Additional Strength Details  (R) Knee extension, flexion 4-4+/5;  DF 4+/5;  PF 4+ manually tested;   (L) Knee extension 4/5;  Flexion 4/5;  DF 4-/5 and EHL 4-/5; PF 4-4+ manually tested;    Too difficult to assess heel/toe walk due to pain and LOB -    Tests     Additional Tests Details  (-) SLR (B);         See Treatment Flow sheet for Exercises, Manual therapy, and modalities. (Pt could not tolerate supine positioning well today to initiate any home stretching program, etc._  FUNCTIONAL ACTIVITIES:   · TAPING / BRACING: NA  · Anatomy / Dysfunction education   · Educated Pt on the Aquatic Program, the goals   · Gave Pt a tour of pool for  Aquatic Therapy, educated him on safety issues to prevent slip/falls, etc  · Educated Bird on ADL modification; Posture and Body mechanics as well to prevent falls -    ___________________________________________________  Assessment & Plan     Assessment  Impairments: abnormal gait, abnormal or restricted ROM, activity intolerance, impaired physical strength, lacks appropriate home exercise program, pain with function, safety issue and weight-bearing intolerance  Functional Limitations: lifting, walking, uncomfortable because of pain, standing and unable to perform repetitive tasks  Assessment  details: Bird Gallegos is a 77 y.o. year-old male referred to physical therapy for Aquatic Therapy with a h/o significant Cervical / Lumbar degenerative issues and multiple surgical procedures including multi-level cervical fusions w/ (B) (L>R) LE weakness and loss of mobility and funciton.  Signs and symptoms are consistent with physical therapy diagnosis on referral -      Prognosis: good    Goals  Plan Goals: Date to achieve goals:  90 days    Date to achieve STGs:  6 weeks    STG 1 Patient will perform aquatic therapy exercises for lumbar  ROM/flexibility, strength and conditioning without increased pain.    STG 2 Patient will demonstrate good postural awareness with standing and walking in pool.    STG 3 Patient will report decreased pain following aquatic therapy session from 9/10 to 7/10 for at least 12 hours     Date to achieve LTGs:  12 weeks    LTG 1 Patient will demonstrate an independent aquatic HEP for lumbar , lower extremity  and core  strength,  ROM/flexibility, conditioning and balance with community resources     LTG 2 Patient will demonstrate increased core strength and balance with use of added resistive equipment.    LTG 3 Patient will report decreased functional disability on Modified Oswestry score from 34 % to 25 %      Plan  Therapy options: will be seen for skilled therapy services  Planned modality interventions: hydrotherapy  Other planned modality interventions: Aquatic therapy  Planned therapy interventions: abdominal trunk stabilization, balance/weight-bearing training, flexibility, functional ROM exercises, strengthening, postural training, neuromuscular re-education, motor coordination training, gait training, home exercise program, therapeutic activities, stretching and transfer training  Frequency: 2x week  Duration in visits: 20  Duration in weeks: 12  Treatment plan discussed with: patient      ___________________________________________________  Manual Therapy:         mins   11430;  Therapeutic Exercise:         mins  78041;     Neuromuscular Conrado:        mins  00563;    Therapeutic Activity:     23     mins  04664;   Self Care:                           mins  26583  Ultrasound:          mins  83470;  Iontophoresis:          mins  26980;    Electrical Stimulation:         mins  09454 ( );  Mechanical Traction:          mins  28183  Dry Needling          mins self-pay    Eval:   20   mins    Timed Treatment:   43   mins                  Total Treatment:     43   mins    PT SIGNATURE:     Eliu Crystalmeg, PT  DATE TREATMENT INITIATED: 3/28/2023  ___________________________________________________  Initial Certification  Certification Period: 6/26/2023  I certify that the therapy services are furnished while this patient is under my care.  The services outlined above are required by this patient, and will be reviewed every 90 days.     PHYSICIAN: ________________________________  DATE: ______  Meena, Ning VARGAS MD        Please sign and return via fax to [phone].. Thank you, [facility].  ______________________________________________________________________  [address] [place] [address]  Phone: [phone] Fax: [phone]

## 2023-05-01 ENCOUNTER — TREATMENT (OUTPATIENT)
Dept: PHYSICAL THERAPY | Facility: CLINIC | Age: 78
End: 2023-05-01
Payer: MEDICARE

## 2023-05-01 DIAGNOSIS — G89.29 CHRONIC MIDLINE THORACIC BACK PAIN: ICD-10-CM

## 2023-05-01 DIAGNOSIS — G89.29 CHRONIC NECK PAIN: ICD-10-CM

## 2023-05-01 DIAGNOSIS — M54.16 RADICULOPATHY, LUMBAR REGION: ICD-10-CM

## 2023-05-01 DIAGNOSIS — M54.2 CHRONIC NECK PAIN: ICD-10-CM

## 2023-05-01 DIAGNOSIS — Z98.1 S/P CERVICAL SPINAL FUSION: ICD-10-CM

## 2023-05-01 DIAGNOSIS — M54.50 CHRONIC MIDLINE LOW BACK PAIN WITHOUT SCIATICA: Primary | ICD-10-CM

## 2023-05-01 DIAGNOSIS — M54.12 RADICULOPATHY, CERVICAL: ICD-10-CM

## 2023-05-01 DIAGNOSIS — M54.6 CHRONIC MIDLINE THORACIC BACK PAIN: ICD-10-CM

## 2023-05-01 DIAGNOSIS — G89.29 CHRONIC MIDLINE LOW BACK PAIN WITHOUT SCIATICA: Primary | ICD-10-CM

## 2023-05-01 NOTE — PROGRESS NOTES
Physical Therapy 30-Day Progress Note     [facility]  [address] [place] [address]  [phone] (phone)  [phone] (fax)    Patient: Bird Gallegos   : 1945  Diagnosis/ICD-10 Code:  Chronic midline low back pain without sciatica [M54.50, G89.29]  Referring practitioner: Ning Robledo MD  Date of Initial Visit: Type: THERAPY  Noted: 3/29/2023  Today's Date: 2023  Patient seen for 2 sessions      Subjective:     Clinical Progress: worse  Home Program Compliance: Yes  Treatment has included:  aquatic therapy and patient education with home exercise program     Subjective Evaluation    History of Present Illness    Subjective comment: Doing alright.  Pain typically about 7/10 in back/legs.     Objective     Posture:  Flexion bias, noted scoliosis to (R) in thoracic region and to (L) in lumbosacral region     Tenderness Diffusely tender L3-S1 central & associated soft tissue L>R - Tender over L>R SI jt regions -      Active Range of Motion   Flexion:  85%    Extension 25%, pain  Lateral Flexion 35% B, pain limiting B      MMT:    Hip Flexion:  4 to 4+/5, ER bias B  Knee extension:  L 4/5, R 4-4+/5  Knee, flexion  L 4/5, R 4-4+/5;  DF 4+/5;  PF 4+ manually tested;   Ankle DF  L 4-/5, 4+/5  Ankle PF   4+/5 B - manually   EHL L 4-/5; R 4+/5 lly tested;      STS transition:  Dependent on B UE assist to rise from sitting    Gait:  Flexion bias with widened CARO, decreased heel strike, noted L trendelenburg.using rwx     Stairs:  Non reciprocal to enter/exit pool using rail support with SBA/supervision of PT    Functional outcome score:  Oswestry score = 35/50 or 70% disability    AQUATIC EX:    Water Walk   Forward 6 x 15-20 ft, sideways 2 x 15-20 ft ea direction along railing; rail support prn  Stretch 1   -  Stretch 2   -  Stretch 3   -  Stretch Other 1  -  Stretch Other 2  -  Vertical Traction  LN/rail x 3 min  Abdominals   LN x 12, back at wall  Clams     -  Hip Abd/Add   10x ea  Hip Flex   10x ea  March in Place  15x  Mini Squat   10x  Toe/Heel Raises  12x  Uni-Squat   -  Uni-Clock   -  Step Ups   -  Bicycle   2 min, seated  Flutter/Scissor  15 / 15, seated  Exercise 1   Seated LAQ x 10 ea  Exercise 2   Seated clams x 10  Exercise 3   -  Exercise 4   -  Exercise 5  -  Exercise 6  -      Assessment & Plan     Assessment    Assessment details: Bird Gallegos is an 78 y.o. M who was seen for his first aquatic therapy session today following his evaluation on 3/29/2023 for treatment of cervical / thoracic / lumbar pain.  He has chronic degenerative changes throughout spine with hx of multiple surgical procedures including multi-level fusions.  He reports pain 7/10 today which is pretty typical.  His self reported Oswestry score indicated notably higher perceived disability.  He continues with limited/painful trunk ROM, core/L>R LE weakness,  impaired functional mobility/gait, and poor balance.  He is appropriate for continuation of skilled therapy to help reduce pain, improve strength, and increase ease with daily functional mobility.      Goals  Plan Goals: Date to achieve STGs:  6 weeks  STG 1 Patient will perform aquatic therapy exercises for lumbar  ROM/flexibility, strength and conditioning without increased pain.  Ongoing, 1st aquatic visit today  STG 2 Patient will demonstrate good postural awareness with standing and walking in pool.  Ongoing, 1st visit aquatic today  STG 3 Patient will report decreased pain following aquatic therapy session from 9/10 to 7/10 for at least 12 hours.  Ongoing, 1st aquatic visit today    Date to achieve LTGs:  12 weeks  LTG 1 Patient will demonstrate an independent aquatic HEP for lumbar , lower extremity  and core  strength,  ROM/flexibility, conditioning and balance with community resources.  Ongoing  LTG 2 Patient will demonstrate increased core strength and balance with use of added resistive equipment.  Ongoing  LTG 3 Patient  will report decreased functional disability on Modified Oswestry score from 34 % to 25 %.  Ongoing, Oswestry score today = 70% disability.               Recommendations: Continue as planned  Timeframe: 1 month  Prognosis to achieve goals: good    PT Signature: Padmini Rincon, PT  [place] License:  [id number]      Based upon review of the patient's progress and continued therapy plan, it is my medical opinion that Bird Gallegos should continue physical therapy treatment at [facility]  [address] [place] [address] [address] [place] [address]  [phone].    Signature: __________________________________  Ning Robledo MD  NPI: [id number]                                          Timed:  Aquatic therapy  48660    38  mins

## 2023-05-05 ENCOUNTER — TREATMENT (OUTPATIENT)
Dept: PHYSICAL THERAPY | Facility: CLINIC | Age: 78
End: 2023-05-05
Payer: MEDICARE

## 2023-05-05 DIAGNOSIS — Z98.1 S/P CERVICAL SPINAL FUSION: ICD-10-CM

## 2023-05-05 DIAGNOSIS — M54.16 RADICULOPATHY, LUMBAR REGION: ICD-10-CM

## 2023-05-05 DIAGNOSIS — M54.12 RADICULOPATHY, CERVICAL: ICD-10-CM

## 2023-05-05 DIAGNOSIS — M54.2 CHRONIC NECK PAIN: ICD-10-CM

## 2023-05-05 DIAGNOSIS — G89.29 CHRONIC MIDLINE LOW BACK PAIN WITHOUT SCIATICA: Primary | ICD-10-CM

## 2023-05-05 DIAGNOSIS — M54.50 CHRONIC MIDLINE LOW BACK PAIN WITHOUT SCIATICA: Primary | ICD-10-CM

## 2023-05-05 DIAGNOSIS — M54.6 CHRONIC MIDLINE THORACIC BACK PAIN: ICD-10-CM

## 2023-05-05 DIAGNOSIS — G89.29 CHRONIC MIDLINE THORACIC BACK PAIN: ICD-10-CM

## 2023-05-05 DIAGNOSIS — G89.29 CHRONIC NECK PAIN: ICD-10-CM

## 2023-05-05 PROCEDURE — 97113 AQUATIC THERAPY/EXERCISES: CPT | Performed by: PHYSICAL THERAPIST

## 2023-05-05 NOTE — PROGRESS NOTES
Physical Therapy Daily Treatment Note    [facility] Physical Therapy Milestone  [address] [place] [address]  [phone] (phone)  [phone] (fax)    Patient: Bird Gallegos   : 1945  Diagnosis/ICD-10 Code:  Chronic midline low back pain without sciatica [M54.50, G89.29]  Referring practitioner: Ning Robledo MD  Date of Initial Visit: Type: THERAPY  Noted: 3/29/2023  Today's Date: 2023  Patient seen for 3 sessions             Subjective   No problems after first session in the pool.  Back is hurting today (like normal).    Objective     AQUATIC EX:   Water Walk-   Forwards across pool, SBA 20 ft X 3, cues for decreased speed to normalize gait  Sidestepping w/ Noodle support, SBA X 80 ft  Hamstring Stretch   w/ back to the wall, 20 sec X 2   Calf Stretch  20 sec X 2   Wall Crawl (BKTC) 20 sec X 3                -  Decompression w/ hips/knees bent x 3 min w/ noodle/rail support  Abdominals                 LN Pushdowns X 15  Hip Abd/Add                15x ea  Hip Flex                       12x ea  March in Place            15x  Mini Squat                   10x  Toe/Heel Raises         15x  Uni-Clock                    -  Step Ups                     -  Bicycle                         2 min, seated  Flutter/Scissor             15 / 15, seated   Seated LAQ x 10 ea  Seated clams x 10    Assessment/Plan  Bird needs cues to activate his abdominals to keep from tipping his body to the side during leg lift exercises.  He was able to progress water walking off rail support and walk across the pool with SBA, no LOB.  Needs cues to decrease speed to normalize gait pattern.          Timed:  Aquatic Therapy    28     mins [id number];    Jair Morin, PT  Physical Therapist    [place] License:  [id number]

## 2023-05-10 ENCOUNTER — TREATMENT (OUTPATIENT)
Dept: PHYSICAL THERAPY | Facility: CLINIC | Age: 78
End: 2023-05-10
Payer: MEDICARE

## 2023-05-10 DIAGNOSIS — M54.16 RADICULOPATHY, LUMBAR REGION: ICD-10-CM

## 2023-05-10 DIAGNOSIS — M54.50 CHRONIC MIDLINE LOW BACK PAIN WITHOUT SCIATICA: Primary | ICD-10-CM

## 2023-05-10 DIAGNOSIS — G89.29 CHRONIC MIDLINE LOW BACK PAIN WITHOUT SCIATICA: Primary | ICD-10-CM

## 2023-05-10 PROCEDURE — 97113 AQUATIC THERAPY/EXERCISES: CPT | Performed by: PHYSICAL THERAPIST

## 2023-05-10 NOTE — PROGRESS NOTES
Physical Therapy Daily Treatment Note    [facility] Physical Therapy Milestone  [address] [place] [address]  [phone] (phone)  [phone] (fax)    Patient: Bird Gallegos   : 1945  Diagnosis/ICD-10 Code:  Chronic midline low back pain without sciatica [M54.50, G89.29]  Referring practitioner: Ning Robledo MD  Date of Initial Visit: Type: THERAPY  Noted: 3/29/2023  Today's Date: 5/10/2023  Patient seen for 4 sessions             Subjective   I sat outside and played with the dog.  In the mornings I'm slow to get moving and stiff.  Water therapy seems to be helping.    Objective       AQUATIC EX:   Water Walk-   Forwards across pool, 20 ft X 6  Sidestepping w/ Noodle support, SBA X 50 ft  Hamstring Stretch   w/ back to the wall, 20 sec X 2   Calf Stretch  20 sec X 2   Wall Crawl (BKTC) 20 sec X 3                -  Decompression w/ hips/knees bent 1 min X 3 w/ noodle/rail support  Abdominals                 LN Pushdowns X 15  Hip Abd/Add                15x ea  SLRs                           15x ea  March in Place            15x  Mini Squat                   15x  Toe/Heel Raises         15x  Uni-Clock                    -  Step Ups                     -  Bicycle                         2 min, seated  Flutter/Scissor             15 / 15, seated   Seated LAQ x 10 ea      Assessment/Plan  Bird requires decompression breaks or wall crawl stretch in between walks to alleviate symptoms in his calves.  He reports  benefit from aqua therapy.  Verbal cues with demonstration for majority of exercises.          Timed:  Aquatic Therapy    40     mins [id number];    Jair Morin, PT  Physical Therapist    [place] License:  [id number]

## 2023-05-11 ENCOUNTER — OFFICE VISIT (OUTPATIENT)
Dept: SLEEP MEDICINE | Facility: HOSPITAL | Age: 78
End: 2023-05-11
Payer: MEDICARE

## 2023-05-11 VITALS — BODY MASS INDEX: 40.91 KG/M2 | HEIGHT: 68 IN | OXYGEN SATURATION: 96 % | HEART RATE: 72 BPM

## 2023-05-11 DIAGNOSIS — G47.33 OSA ON CPAP: Primary | ICD-10-CM

## 2023-05-11 DIAGNOSIS — E66.01 CLASS 3 SEVERE OBESITY DUE TO EXCESS CALORIES WITH SERIOUS COMORBIDITY AND BODY MASS INDEX (BMI) OF 40.0 TO 44.9 IN ADULT: ICD-10-CM

## 2023-05-11 DIAGNOSIS — Z99.89 OSA ON CPAP: Primary | ICD-10-CM

## 2023-05-11 PROCEDURE — G0463 HOSPITAL OUTPT CLINIC VISIT: HCPCS

## 2023-05-11 NOTE — PROGRESS NOTES
"Follow Up [facility] Note     Chief Complaint:  STEF     Primary Care Physician: Mariya Lopez MD    Interval History:   The patient is a 78 y.o. male  who I last saw 2022 and that note was reviewed.  The patient reports he is doing well without new complaints.  He goes to bed at 10 PM and gets out of bed at 8 AM.  He will use the restroom during that time.    Review of Systems:    A complete review of systems was done and all were negative with the exception of some postnasal drip, shortness of breath, especially with activity, and some anxiety    Social History:    Social History     Socioeconomic History   • Marital status:    Tobacco Use   • Smoking status: Former     Packs/day: 0.50     Types: Cigarettes     Quit date:      Years since quittin.3   • Smokeless tobacco: Never   • Tobacco comments:     Smoked between the ages of 25 and 40.   Substance and Sexual Activity   • Alcohol use: No   • Drug use: No   • Sexual activity: Defer       Allergies:  Contrast dye (echo or unknown ct/mr), Iodine, and Latex     Medication Review:  Reviewed.      Vital Signs:    Vitals:    23 1100   Pulse: 72   SpO2: 96%   Height: 172.7 cm (67.99\")     Body mass index is 40.91 kg/m².    Physical Exam:    Constitutional:  Well developed 78 y.o. male that appears in no apparent distress.  Awake & oriented times 3.  Normal mood with normal recent and remote memory and normal judgement.  Eyes:  Conjunctivae normal.  Oropharynx: Previously, moist mucous membranes without exudate and a large tongue and uvula and narrowed posterior pharyngeal opening and class II-3 Mallampati airway.    Self-administered [place] Sleepiness Scale test results: 2, previously 2  0-5 Lower normal daytime sleepiness  6-10 Higher normal daytime sleepiness  11-12 Mild, 13-15 Moderate, & 16-24 Severe excessive daytime sleepiness     Downloaded PAP Data Reviewed For Therapeutic Response and Compliance:  DME is " [employer] and he uses a fullface mask.  Downloads between 3/5 and 5/10/2023 compliance 100%.  Average usage is 8 hours and 30 minutes.  Average AHI is normal without leak.  DreamStation 2 auto CPAP set at +16     I have reviewed the above results and compared them with the patient's last downloads and reviewed with the patient.    Impression:   Obstructive sleep apnea adequately treated with DreamStation 2 auto CPAP set at +16. The patient appears to be at goal with good compliance and usage. The patient has no complaints of hypersomnolence.    Plan:  Good sleep hygiene measures should be maintained.  Weight loss would be beneficial in this patient who has class III severe obesity by Body mass index is 40.91 kg/m²..      After evaluating the patient and assessing results available, the patient is benefiting from the treatment being provided.     The patient will continue DreamStation 2 auto CPAP.  Potential side effects of CPAP therapy reviewed and addressed as needed.  After clinical evaluation and review of downloads, I recommend changes to the patient's pressures.  The patient's set pressure of +16 will be changed to auto CPAP of 15-20.  A new prescription will be sent to the patient's DME.    I answered all of the patient's questions.  The patient will call the [facility] for any problems with side effects of CPAP therapy and will follow up in 1 year.      Jensen Mas MD  Sleep Medicine  05/11/23  12:14 EDT

## 2023-05-12 ENCOUNTER — TREATMENT (OUTPATIENT)
Dept: PHYSICAL THERAPY | Facility: CLINIC | Age: 78
End: 2023-05-12
Payer: MEDICARE

## 2023-05-12 DIAGNOSIS — M54.2 CHRONIC NECK PAIN: ICD-10-CM

## 2023-05-12 DIAGNOSIS — M54.50 CHRONIC MIDLINE LOW BACK PAIN WITHOUT SCIATICA: Primary | ICD-10-CM

## 2023-05-12 DIAGNOSIS — G89.29 CHRONIC MIDLINE THORACIC BACK PAIN: ICD-10-CM

## 2023-05-12 DIAGNOSIS — M54.12 RADICULOPATHY, CERVICAL: ICD-10-CM

## 2023-05-12 DIAGNOSIS — G89.29 CHRONIC MIDLINE LOW BACK PAIN WITHOUT SCIATICA: Primary | ICD-10-CM

## 2023-05-12 DIAGNOSIS — Z98.1 S/P CERVICAL SPINAL FUSION: ICD-10-CM

## 2023-05-12 DIAGNOSIS — M54.6 CHRONIC MIDLINE THORACIC BACK PAIN: ICD-10-CM

## 2023-05-12 DIAGNOSIS — M54.16 RADICULOPATHY, LUMBAR REGION: ICD-10-CM

## 2023-05-12 DIAGNOSIS — G89.29 CHRONIC NECK PAIN: ICD-10-CM

## 2023-05-12 PROCEDURE — 97113 AQUATIC THERAPY/EXERCISES: CPT | Performed by: PHYSICAL THERAPIST

## 2023-05-12 NOTE — PROGRESS NOTES
Physical Therapy Daily Treatment Note    [facility] Physical Therapy Milestone  [address] [id number] [phone] (phone)  [phone] (fax)    Patient: Bird Gallegos   : 1945  Diagnosis/ICD-10 Code:  Chronic midline low back pain without sciatica [M54.50, G89.29]  Referring practitioner: Ning Robledo MD  Date of Initial Visit: Type: THERAPY  Noted: 3/29/2023  Today's Date: 2023  Patient seen for 5 sessions             Subjective   Walking better - trying to do heel to toe.    Objective     AQUATIC EXERCISE:   Water Walk-  Forwards across pool X 5 minutes w/ breaks as needed  Sidestepping X 100 ft  Hamstring Stretch   w/ back to the wall, 20 sec X 2   Calf Stretch  20 sec X 2   Wall Crawl (BKTC) 20 sec X 3                -  Decompression w/ hips/knees bent 1 min X 3 w/ noodle/rail support  Abdominals                 Solid white Pushdowns X 15  Hip Abd/Add                15x ea  SLRs                           15x ea  March in Place            15x, holding solid noodle  Mini Squat                   15x  Toe/Heel Raises         15x  Sit to Stands X 10, No hand support  Uni-Clock                    -  Step Ups                     Next  Bicycle                         2 min, seated, on own  Flutter/Scissor             15 / 15, seated, on own   Seated LAQ x 10 ea, on own    Assessment/Plan  Bird is pleased with his improvement in walking that he has noticed.  He is also tolerating more water walking with less breaks in between.  At times he needs reminders to breath and not hold his breath with stretching. He was able to perform all water walking with supervision vs SBA and all without support of a Noodle.          Timed:  Aquatic Therapy    40     mins [id number];    Jair Morin, PT  Physical Therapist    [place] License:  [id number]

## 2023-05-15 ENCOUNTER — TREATMENT (OUTPATIENT)
Dept: PHYSICAL THERAPY | Facility: CLINIC | Age: 78
End: 2023-05-15
Payer: MEDICARE

## 2023-05-15 DIAGNOSIS — M54.2 CHRONIC NECK PAIN: ICD-10-CM

## 2023-05-15 DIAGNOSIS — Z98.1 S/P CERVICAL SPINAL FUSION: ICD-10-CM

## 2023-05-15 DIAGNOSIS — G89.29 CHRONIC NECK PAIN: ICD-10-CM

## 2023-05-15 DIAGNOSIS — M54.16 RADICULOPATHY, LUMBAR REGION: ICD-10-CM

## 2023-05-15 DIAGNOSIS — G89.29 CHRONIC MIDLINE THORACIC BACK PAIN: ICD-10-CM

## 2023-05-15 DIAGNOSIS — M54.50 CHRONIC MIDLINE LOW BACK PAIN WITHOUT SCIATICA: Primary | ICD-10-CM

## 2023-05-15 DIAGNOSIS — M54.12 RADICULOPATHY, CERVICAL: ICD-10-CM

## 2023-05-15 DIAGNOSIS — M54.6 CHRONIC MIDLINE THORACIC BACK PAIN: ICD-10-CM

## 2023-05-15 DIAGNOSIS — G89.29 CHRONIC MIDLINE LOW BACK PAIN WITHOUT SCIATICA: Primary | ICD-10-CM

## 2023-05-15 PROCEDURE — 97113 AQUATIC THERAPY/EXERCISES: CPT | Performed by: PHYSICAL THERAPIST

## 2023-05-15 NOTE — PROGRESS NOTES
Physical Therapy Daily Treatment Note    [facility] Physical Therapy Milestone  [address] [id number]  [phone] (phone)  [phone] (fax)    Patient: iBrd Gallegos   : 1945  Diagnosis/ICD-10 Code:  Chronic midline low back pain without sciatica [M54.50, G89.29]  Referring practitioner: Ning Robledo MD  Date of Initial Visit: Type: THERAPY  Noted: 3/29/2023  Today's Date: 5/15/2023  Patient seen for 6 sessions             Subjective   I got out in the back yard and did some weed eating.    Objective     AQUATIC EXERCISE:  Water walking Forwards, Sideways and Backwards several laps on his own pre-treatment  NEW: Rows w/ closed paddles X 15             Paddle Stirs  12/12             Shoulder Abd/Add w/ closed paddles X 15             Step Ups 6 inch X 10 each  Hamstring Stretch   w/ back to the wall, 20 sec X 2   Calf Stretch  20 sec X 2   Wall Crawl (BKTC) 20 sec X 3                -  Decompression w/ hips/knees bent  --  Abdominals                 Solid white Pushdowns X 20  Hip Abd/Add                20x ea  SLRs                           20x ea  March in Place            15x, holding solid noodle  Mini Squat                   15x  Toe/Heel Raises         20x  Sit to Stands X 10, No hand support  Uni-Clock                    -  Bicycle                         2 min, seated, on own  Flutter/Scissor             15 / 15, seated, on own    Assessment/Plan  Bird is pleased with his progress with walking and was also able to do weed eating in the yard.  He is becoming more independent with the aquatic exercises and was able to increase repetitions to 20 on several.  Today he was progressed to paddle resisted exercises and step ups.          Timed:  Aquatic Therapy    39     mins 71249;    Jair Morin, PT  Physical Therapist    [place] License:  [id number]

## 2023-05-17 ENCOUNTER — TELEPHONE (OUTPATIENT)
Dept: SLEEP MEDICINE | Facility: HOSPITAL | Age: 78
End: 2023-05-17
Payer: MEDICARE

## 2023-05-19 ENCOUNTER — TREATMENT (OUTPATIENT)
Dept: PHYSICAL THERAPY | Facility: CLINIC | Age: 78
End: 2023-05-19
Payer: MEDICARE

## 2023-05-19 DIAGNOSIS — M54.16 RADICULOPATHY, LUMBAR REGION: ICD-10-CM

## 2023-05-19 DIAGNOSIS — G89.29 CHRONIC MIDLINE THORACIC BACK PAIN: ICD-10-CM

## 2023-05-19 DIAGNOSIS — M54.2 CHRONIC NECK PAIN: ICD-10-CM

## 2023-05-19 DIAGNOSIS — G89.29 CHRONIC MIDLINE LOW BACK PAIN WITHOUT SCIATICA: Primary | ICD-10-CM

## 2023-05-19 DIAGNOSIS — G89.29 CHRONIC NECK PAIN: ICD-10-CM

## 2023-05-19 DIAGNOSIS — M54.12 RADICULOPATHY, CERVICAL: ICD-10-CM

## 2023-05-19 DIAGNOSIS — M54.50 CHRONIC MIDLINE LOW BACK PAIN WITHOUT SCIATICA: Primary | ICD-10-CM

## 2023-05-19 DIAGNOSIS — M54.6 CHRONIC MIDLINE THORACIC BACK PAIN: ICD-10-CM

## 2023-05-19 DIAGNOSIS — Z98.1 S/P CERVICAL SPINAL FUSION: ICD-10-CM

## 2023-05-19 NOTE — PROGRESS NOTES
Physical Therapy Daily Treatment Note    Patient: Bird Gallegos   : 1945  Diagnosis/ICD-10 Code:  Chronic midline low back pain without sciatica [M54.50, G89.29]  Referring practitioner: Ning Robledo MD  Date of Initial Visit: Type: THERAPY  Noted: 3/29/2023  Today's Date: 2023  Patient seen for 7 sessions             Subjective   Pt states his pain is 5/10 today in back.  He states that is normal for him.    Objective   AQUATIC EXERCISE:  Water walking Forwards, Sideways and Backwards several laps on his own pre-treatment  NEW: Rows w/ closed paddles X 15             Paddle Stirs  15/15             Shoulder Abd/Add w/ closed paddles X 15             Step Ups 6 inch X 10 each  Hamstring Stretch   w/ back to the wall, 20 sec X 2   Calf Stretch  20 sec X 2   Wall Crawl (BKTC) 20 sec X 3                -  Decompression w/ hips/knees bent x 3 min with LN  Abdominals                 Solid white Pushdowns X 20  Hip Abd/Add                20x ea  SLRs                           20x ea  March in Place            15x, holding solid noodle  Mini Squat                   15x  Toe/Heel Raises         20x  Sit to Stands X 10, No hand support  Uni-Clock                    -  Bicycle                         2 min, seated, on own  Flutter/Scissor             15 / 15, seated, on own    Assessment/Plan  Pt progressing with aquatic therapy and states he has seen a decrease in back pain since starting.  Pt required mod verbal and tactile cues to perform calf stretch correctly with heel on the ground.  Pt also required verbal cues to perform paddle exercises and wall stretch correctly.  Plan to continue progressing with aquatic strengthening and balance exercise while monitoring for exacerbation of pain.         Timed:  Aquatic Therapy    28     mins 51165;    Ligia Rainey, PT  Physical Therapist    [place] License: [id number]

## 2023-05-22 ENCOUNTER — TREATMENT (OUTPATIENT)
Dept: PHYSICAL THERAPY | Facility: CLINIC | Age: 78
End: 2023-05-22
Payer: MEDICARE

## 2023-05-22 DIAGNOSIS — M54.16 RADICULOPATHY, LUMBAR REGION: ICD-10-CM

## 2023-05-22 DIAGNOSIS — M54.12 RADICULOPATHY, CERVICAL: ICD-10-CM

## 2023-05-22 DIAGNOSIS — Z98.1 S/P CERVICAL SPINAL FUSION: ICD-10-CM

## 2023-05-22 DIAGNOSIS — G89.29 CHRONIC MIDLINE LOW BACK PAIN WITHOUT SCIATICA: Primary | ICD-10-CM

## 2023-05-22 DIAGNOSIS — M54.50 CHRONIC MIDLINE LOW BACK PAIN WITHOUT SCIATICA: Primary | ICD-10-CM

## 2023-05-22 PROCEDURE — 97113 AQUATIC THERAPY/EXERCISES: CPT | Performed by: PHYSICAL THERAPIST

## 2023-05-22 NOTE — PROGRESS NOTES
Physical Therapy Daily Treatment Note    [facility] Physical Therapy Milestone  [address], [place] [address] [phone] (phone)  [phone] (fax)    Patient: Bird Gallegos   : 1945  Diagnosis/ICD-10 Code:  Chronic midline low back pain without sciatica [M54.50, G89.29]  Referring practitioner: Ning Robledo MD  Date of Initial Visit: Type: THERAPY  Noted: 3/29/2023  Today's Date: 2023  Patient seen for 8 sessions             Subjective   Cut the grass with the riding mower and used the push mower for the dog run.  Uses a 4 hodges to help with weed eating to get from one area to another. Back pain rated 3/10.    Objective     AQUATIC EXERCISE:  Water walking Forwards, Sideways and Backwards several laps on his own pre-treatment   Rows w/ closed paddles X 20    Paddle Stirs  15/15   Shoulder Abd/Add w/ closed paddles X 15   Step Ups 6 inch X 10 each  Hamstring Stretch   w/ back to the wall, 20 sec X 2   Calf Stretch  20 sec X 2   Wall Crawl (BKTC) 20 sec X 3                -  Decompression w/ hips/knees bent x 3 min with LN  Abdominals                 Solid white Pushdowns X 20  Hip Abd/Add                20x ea  SLRs                           20x ea  March in Place            15x, holding solid noodle  Mini Squat                   20x  Toe/Heel Raises         20x  Sit to Stands X 10, No hand support  Uni-Clock                    -  Bicycle                         2 min, seated, on own  Flutter/Scissor             15 / 15, seated, on own       Assessment/Plan  Short term goals 1,2,and 3 have been met.          Timed:  Aquatic Therapy    39     mins [id number];    Jair Morin, PT  Physical Therapist    [place] License:  [id number]

## 2023-05-26 ENCOUNTER — TREATMENT (OUTPATIENT)
Dept: PHYSICAL THERAPY | Facility: CLINIC | Age: 78
End: 2023-05-26
Payer: MEDICARE

## 2023-05-26 DIAGNOSIS — M54.50 CHRONIC MIDLINE LOW BACK PAIN WITHOUT SCIATICA: Primary | ICD-10-CM

## 2023-05-26 DIAGNOSIS — G89.29 CHRONIC NECK PAIN: ICD-10-CM

## 2023-05-26 DIAGNOSIS — M54.6 CHRONIC MIDLINE THORACIC BACK PAIN: ICD-10-CM

## 2023-05-26 DIAGNOSIS — M54.12 RADICULOPATHY, CERVICAL: ICD-10-CM

## 2023-05-26 DIAGNOSIS — G89.29 CHRONIC MIDLINE LOW BACK PAIN WITHOUT SCIATICA: Primary | ICD-10-CM

## 2023-05-26 DIAGNOSIS — M54.16 RADICULOPATHY, LUMBAR REGION: ICD-10-CM

## 2023-05-26 DIAGNOSIS — M54.2 CHRONIC NECK PAIN: ICD-10-CM

## 2023-05-26 DIAGNOSIS — G89.29 CHRONIC MIDLINE THORACIC BACK PAIN: ICD-10-CM

## 2023-05-26 DIAGNOSIS — Z98.1 S/P CERVICAL SPINAL FUSION: ICD-10-CM

## 2023-05-26 PROCEDURE — 97113 AQUATIC THERAPY/EXERCISES: CPT | Performed by: PHYSICAL THERAPIST

## 2023-05-31 ENCOUNTER — TREATMENT (OUTPATIENT)
Dept: PHYSICAL THERAPY | Facility: CLINIC | Age: 78
End: 2023-05-31

## 2023-05-31 DIAGNOSIS — M54.50 CHRONIC MIDLINE LOW BACK PAIN WITHOUT SCIATICA: Primary | ICD-10-CM

## 2023-05-31 DIAGNOSIS — M54.12 RADICULOPATHY, CERVICAL: ICD-10-CM

## 2023-05-31 DIAGNOSIS — M54.16 RADICULOPATHY, LUMBAR REGION: ICD-10-CM

## 2023-05-31 DIAGNOSIS — G89.29 CHRONIC MIDLINE LOW BACK PAIN WITHOUT SCIATICA: Primary | ICD-10-CM

## 2023-05-31 DIAGNOSIS — M54.2 CHRONIC NECK PAIN: ICD-10-CM

## 2023-05-31 DIAGNOSIS — Z98.1 S/P CERVICAL SPINAL FUSION: ICD-10-CM

## 2023-05-31 DIAGNOSIS — G89.29 CHRONIC NECK PAIN: ICD-10-CM

## 2023-05-31 PROCEDURE — 97113 AQUATIC THERAPY/EXERCISES: CPT | Performed by: PHYSICAL THERAPIST

## 2023-05-31 NOTE — PROGRESS NOTES
Physical Therapy Daily Treatment Note    [facility] Physical Therapy Milestone  [address] [place] [address]  [phone] (phone)  [phone] (fax)    Patient: Bird Gallegos   : 1945  Diagnosis/ICD-10 Code:  Chronic midline low back pain without sciatica [M54.50, G89.29]  Referring practitioner: Ning Robledo MD  Date of Initial Visit: Type: THERAPY  Noted: 3/29/2023  Today's Date: 2023  Patient seen for 10 sessions             Subjective   My hips are bothering me this morning. I haven't done much activity over the past 5 days and I can tell.    Objective     AQUATIC EXERCISE:  Water walking Forwards, Sideways and Backwards -Independent   Rows w/ closed paddles X 20    Paddle Stirs  15/15   Shoulder Abd/Add w/ closed paddles --   Step Ups 6 inch X 15 each  Hamstring Stretch   w/solid  noodle under ankle, 20 sec X 2   Calf Stretch  20 sec X 2 Deferred  Wall Crawl (BKTC) 20 sec X 3                -  Decompression w/ hips/knees bent x 2 min with LN  Abdominals   -     Solid white Pushdowns X 20  Hip Abd/Add                20x ea  SLRs                           20x ea  March in Place            15x, No UE support  Mini Squat                   20x  Toe/Heel Raises         20x  Sit to Stands X 10, No hand support  Uni-Clock                    -  Bicycle                         2 min, seated, on own  Flutter/Scissor             15 / 15, seated, on own    Assessment/Plan  Hip drop noted during ambulation with walker today and more difficulty lifting feet to clear floor. Bird admits he has not been active the past 5 days.           Timed:  Aquatic Therapy    40     mins [id number];    Jair Morin, PT  Physical Therapist    [place] License:  [id number]

## 2023-06-09 ENCOUNTER — TREATMENT (OUTPATIENT)
Dept: PHYSICAL THERAPY | Facility: CLINIC | Age: 78
End: 2023-06-09
Payer: MEDICARE

## 2023-06-09 DIAGNOSIS — M54.12 RADICULOPATHY, CERVICAL: ICD-10-CM

## 2023-06-09 DIAGNOSIS — G89.29 CHRONIC MIDLINE THORACIC BACK PAIN: ICD-10-CM

## 2023-06-09 DIAGNOSIS — M54.6 CHRONIC MIDLINE THORACIC BACK PAIN: ICD-10-CM

## 2023-06-09 DIAGNOSIS — M54.2 CHRONIC NECK PAIN: ICD-10-CM

## 2023-06-09 DIAGNOSIS — M54.16 RADICULOPATHY, LUMBAR REGION: ICD-10-CM

## 2023-06-09 DIAGNOSIS — G89.29 CHRONIC NECK PAIN: ICD-10-CM

## 2023-06-09 DIAGNOSIS — M54.50 CHRONIC MIDLINE LOW BACK PAIN WITHOUT SCIATICA: Primary | ICD-10-CM

## 2023-06-09 DIAGNOSIS — Z98.1 S/P CERVICAL SPINAL FUSION: ICD-10-CM

## 2023-06-09 DIAGNOSIS — G89.29 CHRONIC MIDLINE LOW BACK PAIN WITHOUT SCIATICA: Primary | ICD-10-CM

## 2023-06-09 PROCEDURE — 97113 AQUATIC THERAPY/EXERCISES: CPT | Performed by: PHYSICAL THERAPIST

## 2023-06-09 NOTE — PROGRESS NOTES
Physical Therapy 60-Day Progress Note     [facility] Physical Therapy Milestone  [address] [place] [address]  [phone] (phone)  [phone] (fax)    Patient: Bird Gallegos   : 1945  Diagnosis/ICD-10 Code:  Chronic midline low back pain without sciatica [M54.50, G89.29]  Referring practitioner: Ning Robledo MD  Date of Initial Visit: Type: THERAPY  Noted: 3/29/2023  Today's Date: 2023  Patient seen for 11 sessions      Subjective:     Clinical Progress: improved  Home Program Compliance: N/A  Treatment has included:  aquatic therapy    Subjective Evaluation    History of Present Illness    Subjective comment: Pain 4/10 today with worst pain in last week up to ~ 8/10 with yardwork / cutting grass.  I'm definitely getting around better.     Objective     Posture:  Flexion bias, noted scoliosis to (R) in thoracic region and to (L) in lumbosacral region     Active Range of Motion   Flexion:  WFL    Extension 25%, pain  Lateral Flexion 40% B, pain     MMT:    Hip Flexion:  grossly 4+/5, ER bias B w/ L slightly weaker  Knee extension:  L 4/5, R 4+/5  Knee, flexion  L 4/5, R 4+/5;    Ankle DF  L 4-/5, 4+/5  Ankle PF   4+/5 B   EHL L 4-/5; R 4+/5      STS transition:  Able to rise from sitting in poolside chair without UE assist     Gait:  Flexion bias with widened CARO, decreased heel strike, noted L trendelenburg.using rwx      Stairs:  Non reciprocal to enter/exit pool using rail support      Functional outcome score:  Oswestry score = 20/50 or 40% disability      AQUATIC EXERCISE:    Water walking Forwards, Sideways and Backwards -Independent  Rows w/ closed paddles x 20  Paddle Stirs  15/15   Shoulder Abd/Add w/ closed paddles 15x   Step Ups 6 inch X 15 each  Hamstring Stretch   w/solid  noodle under ankle, 20 sec x 2 ea  Calf Stretch  20 sec x 2 Deferred  Wall Crawl (BKTC) 30 sec x 2                -  Decompression w/ hips/knees bent x 2 min with LN  Abdominals   -     Solid  white Pushdowns x 20  Hip Abd/Add                20x ea  SLRs                           20x ea  March in Place            15x, No UE support  Mini Squat                   20x  Toe/Heel Raises         20x  Sit to Stands from pool bench 10x, No hand support  Bicycle                         2 min, seated, on own  Flutter/Scissor             15 / 15, seated, on own    Assessment & Plan     Assessment    Assessment details: Bird Gallegos is an 78 y.o. M who has attended 10 aquatic therapy sessions since his evaluation on 3/29/2023 for treatment of cervical / thoracic / lumbar pain.  He has chronic degenerative changes throughout spine with hx of multiple surgical procedures including multi-level fusions.  He reports overall decreased pain rating it 4/10 today with worst pain in last week or so ~8/10 with yardwork / grass cutting.  His self reported Oswestry score today = 40% perceived disability.  He notes he is able to get around better and do more with less pain.  He demonstrates improving postural awareness, functional transfers, mobility, and gait.  He exhibits compensated gait with trendelenburg, strength deficits, and decreased balance.  He has met 2 of 3 STGs and 1 of 3 LTGs with remaining STG and LTGs ongoing / progressing.  He may continue to benefit from skilled therapy to mitigate pain, increased strength, improve functional abilities /activity tolerance and facilitate transition to independent self care.         Goals  Plan Goals: Date to achieve STGs:  6 weeks  STG 1 Patient will perform aquatic therapy exercises for lumbar  ROM/flexibility, strength and conditioning without increased pain.  Met  STG 2 Patient will demonstrate good postural awareness with standing and walking in pool.  Ongoing, demonstrates improving postural awareness but still needs some cuing  STG 3 Patient will report decreased pain following aquatic therapy session from 9/10 to 7/10 for at least 12 hours.  Met    Date to achieve LTGs:   12 weeks  LTG 1 Patient will demonstrate an independent aquatic HEP for lumbar , lower extremity  and core  strength,  ROM/flexibility, conditioning and balance with community resources.  Ongoing / Progressing  LTG 2 Patient will demonstrate increased core strength and balance with use of added resistive equipment.  Ongoing / Progressing  LTG 3 Patient will report decreased functional disability on Modified Oswestry score from 34 to 25   Met, Oswestry score today = 20/50 (40% perceived disability)             Recommendations: Continue as planned  Timeframe: 1 month  Prognosis to achieve goals: good    PT Signature: Padmini Rincon, PT  [place] License:  [id number]      Based upon review of the patient's progress and continued therapy plan, it is my medical opinion that Bird Gallegos should continue physical therapy treatment at [facility] PHYSICAL THERAPY  [address] [place] [address] [phone].    Signature: __________________________________  Ning Robledo MD  NPI: [id number]                                          Timed:  Aquatic therapy  [id number]    45   mins

## 2023-06-19 ENCOUNTER — OFFICE VISIT (OUTPATIENT)
Dept: CARDIOLOGY | Facility: CLINIC | Age: 78
End: 2023-06-19
Payer: MEDICARE

## 2023-06-19 VITALS
SYSTOLIC BLOOD PRESSURE: 142 MMHG | BODY MASS INDEX: 41.37 KG/M2 | WEIGHT: 273 LBS | HEIGHT: 68 IN | HEART RATE: 64 BPM | DIASTOLIC BLOOD PRESSURE: 84 MMHG

## 2023-06-19 DIAGNOSIS — I25.810 CORONARY ARTERY DISEASE INVOLVING CORONARY BYPASS GRAFT OF NATIVE HEART WITHOUT ANGINA PECTORIS: Primary | ICD-10-CM

## 2023-06-19 PROCEDURE — 93000 ELECTROCARDIOGRAM COMPLETE: CPT | Performed by: INTERNAL MEDICINE

## 2023-06-19 PROCEDURE — 99214 OFFICE O/P EST MOD 30 MIN: CPT | Performed by: INTERNAL MEDICINE

## 2023-06-19 PROCEDURE — 3077F SYST BP >= 140 MM HG: CPT | Performed by: INTERNAL MEDICINE

## 2023-06-19 PROCEDURE — 3079F DIAST BP 80-89 MM HG: CPT | Performed by: INTERNAL MEDICINE

## 2023-06-19 RX ORDER — ALBUTEROL SULFATE 90 UG/1
AEROSOL, METERED RESPIRATORY (INHALATION)
COMMUNITY
Start: 2023-06-08

## 2023-06-19 RX ORDER — TRIAMCINOLONE ACETONIDE 5 MG/G
CREAM TOPICAL
COMMUNITY
Start: 2022-11-11 | End: 2023-11-11

## 2023-06-19 RX ORDER — POTASSIUM CHLORIDE 750 MG/1
1 TABLET, FILM COATED, EXTENDED RELEASE ORAL DAILY
COMMUNITY
Start: 2023-01-17

## 2023-06-19 RX ORDER — GLIMEPIRIDE 1 MG/1
1 TABLET ORAL DAILY
Qty: 30 TABLET | Refills: 11 | COMMUNITY
Start: 2023-02-16 | End: 2024-02-16

## 2023-06-19 NOTE — PROGRESS NOTES
Subjective:     Encounter Date: 06/19/23      Patient ID: Bird Gallegos is a 78 y.o. male.    Chief Complaint: CAD, AVR, CVA    This is a 77-year-old man with past medical history of CAD, AS s/p bioprothetic AVR obstructive sleep apnea, diabetes on oral medications, hypertension.      I originally met him in March 2019 around the time his  primary care physician noted a significant murmur.   He had an echocardiogram at [facility] which showed severe aortic stenosis with a mean gradient of 45 mmHg and a peak velocity of 4.5 m/s squared, with a calculated KELSEA of 0.51 cm².  He underwent left heart catheterization as an outpatient which showed single-vessel coronary disease of the OM 2.  Given coronary disease and severe aortic stenosis, the surgeons performed traditional surgical AVR with single-vessel vein graft bypass to the OM.      He did quite well until November 2019 when he was diagnosed with a thalamic stroke due to severe hypertension. He was treated with DAPT x 30 days and is now on Plavix. His ZIO patch showed no atrial arrhythmias.     Today he returns.  No angina or dyspnea.  Chronic lower extremity edema and he occasionally takes twice daily dosing Lasix for this.  He is working in physical therapy in the pool for back pain.  He has become a vegetarian and is really trying to lose weight.    He is a former .  He quit smoking 30 years ago.  He is a recent .  His daughter is a former nurse.  He has a new border collie dog who he has been walking with.    REVIEW OF SYSTEMS:   All systems reviewed.  Pertinent positives identified in HPI.  All other systems are negative.    The following portions of the patient's history were reviewed and updated as appropriate: allergies, current medications, past family history, past medical history, past social history, past surgical history and problem list.    Past Medical History:   Diagnosis Date    Anxiety and depression     Aortic stenosis      Arthritis     Coronary artery disease     CTS (carpal tunnel syndrome)     Degenerative joint disease (DJD) of lumbar spine     Diabetes mellitus     Diabetic neuropathy     Difficulty walking     Diverticulosis     COLVIN (dyspnea on exertion)     GERD (gastroesophageal reflux disease)     Headache, tension-type     Heart murmur     History of cellulitis     LEFT LEG    HL (hearing loss)     Hypertension     STEF (obstructive sleep apnea)     STEF on auto CPAP     Stroke     Vision loss        Family History   Problem Relation Age of Onset    Heart disease Mother     Arthritis Mother     Cancer Father     Hypertension Brother     Diabetes Brother     Hypertension Brother     Parkinsonism Sister     Arthritis Sister     Heart disease Maternal Grandmother     Heart disease Maternal Grandfather     Heart disease Paternal Grandmother     Malig Hyperthermia Neg Hx        Social History     Socioeconomic History    Marital status:    Tobacco Use    Smoking status: Former     Packs/day: 0.50     Types: Cigarettes     Quit date:      Years since quittin.4    Smokeless tobacco: Never    Tobacco comments:     Smoked between the ages of 25 and 40.   Vaping Use    Vaping Use: Never used   Substance and Sexual Activity    Alcohol use: No    Drug use: No    Sexual activity: Defer       Allergies   Allergen Reactions    Contrast Dye (Echo Or Unknown Ct/Mr) Hives     Hives and rash reported    Iodine Unknown - Low Severity    Latex Hives       Past Surgical History:   Procedure Laterality Date    BACK SURGERY      X2    CARDIAC CATHETERIZATION N/A 3/21/2019    Procedure: Coronary angiography;  Surgeon: Wendy Braxton MD;  Location: [facility] CATH INVASIVE LOCATION;  Service: Cardiology    CORONARY ARTERY BYPASS GRAFT WITH AORTIC VALVE REPAIR/REPLACEMENT N/A 3/26/2019    Procedure: INTRAOP QUIQUE; CORONARY ARTERY BYPASS X 1 UTILIZING ENDOSCOPICALLY HARVESTED RIGHT GREATER SAPHENOUS VEIN; AORTIC VALVE REPLACEMENT AND PRP.;   Surgeon: Alexx Gandara MD;  Location: [facility];  Service: Cardiothoracic    POSTERIOR CERVICAL FUSION      TENDON REPAIR Left     KNEE    WRIST SURGERY Bilateral          ECG 12 Lead    Date/Time: 6/19/2023 12:01 PM  Performed by: Wendy Braxton MD  Authorized by: Wendy Braxton MD   Comparison: compared with previous ECG from 12/14/2021  Similar to previous ECG  Rhythm: sinus rhythm  Rate: normal  ST Segments: ST segments normal  T Waves: T waves normal  QRS axis: normal and left  Other findings: left atrial abnormality and poor R wave progression    Clinical impression: non-specific ECG           Objective:     Physical Exam  GEN: no distress, alert and oriented  Eyes: normal sclera, normal lids and lashes  HENT: moist mucus membranes,   Lungs CTAB, no rales or wheezes  Chest: no abnormalities  Neck: Too large to evaluate JVD.  CV: RRR, left ear murmur has resolved  Abdo: Obese soft,  Nontender, nondistended. No pain on deep palpation but has pain when laying back  Extr: +2 bl edema, stable  Skin: no rash, warm, dry  Heme/Lymph: no bruising  Psych: organized thought, normal behavior and affect  Neuro: decreased sensation LUE/hand, adequate 4/4 strength in             Assessment:          Diagnosis Plan   1. Coronary artery disease involving coronary bypass graft of native heart without angina pectoris  ECG 12 Lead                 Plan:       This is a 78-year-old man with prior severe AS and singlevessel CAD, s/p  SAVR with SVG to OM2     1. Aortic Stenosis s/p Bioprosthetic AVR, stable.  We will plan to repeat his echocardiogram next year.  2 . CAD, no angina, s/p SVG to OM1.  Plavix.   3. CVA:November 2019- thalamic stroke with residual LUE paresthesias/ sensory deficit. ZIO patch shows no AF. Related to severe hypertension.   4. HTN- BP controlled  5. HLD: Needs repeat lipids.  6. Diastolic dysfunction: LE edema is likely multifactorial including HFpEF and obesity.  7. LE pain, mild abnormalitiy  in RALPH.     Dr. Lopez, thank you for referring this kind patient tot me. I will see him again in 12 months.  Please call with any questions or concerns.    Wendy Braxton MD  06/19/23     Outpatient Encounter Medications as of 6/19/2023   Medication Sig Dispense Refill    albuterol sulfate  (90 Base) MCG/ACT inhaler       ascorbic acid (VITAMIN C) 1000 MG tablet Take 1 tablet by mouth Daily.      atorvastatin (LIPITOR) 40 MG tablet Take 1 tablet by mouth Daily.      carvedilol (COREG) 25 MG tablet Take 1 tablet by mouth Every 12 (Twelve) Hours. 60 tablet 2    clonazePAM (KlonoPIN) 1 MG disintegrating tablet Take 2 tablets by mouth Every Night.      clopidogrel (PLAVIX) 75 MG tablet Take 1 tablet by mouth Daily.      erythromycin (ROMYCIN) 5 MG/GM ophthalmic ointment apply (1CM) by ophthalmic route every day ribbon into the lower conjunctival sac(s) in the affected eye(s)      fenofibrate (TRICOR) 145 MG tablet TAKE 1 TABLET EVERY DAY 90 tablet 3    furosemide (LASIX) 40 MG tablet Take 2 tablets by mouth Daily. 90 tablet 3    gabapentin (NEURONTIN) 300 MG capsule Take 1 capsule by mouth 3 (Three) Times a Day.      glimepiride (AMARYL) 1 MG tablet Take 1 tablet by mouth Daily. 30 tablet 11    HYDROcodone-acetaminophen (NORCO) 5-325 MG per tablet Take  by mouth As Needed.      ketoconazole (NIZORAL) 2 % shampoo       lisinopril (PRINIVIL,ZESTRIL) 20 MG tablet Take 1 tablet by mouth Daily.      loratadine (CLARITIN) 10 MG tablet Take 1 tablet by mouth Daily.      metFORMIN (GLUCOPHAGE) 1000 MG tablet Take 1 tablet by mouth 2 (Two) Times a Day With Meals.      methocarbamol (ROBAXIN) 750 MG tablet Take  by mouth As Needed.      Multiple Vitamins-Minerals (ICAPS AREDS 2 PO) Take 1 tablet by mouth 2 (Two) Times a Day.      olopatadine (PATANOL) 0.1 % ophthalmic solution Apply  to eye(s) as directed by provider Daily.      potassium chloride (K-DUR,KLOR-CON) 10 MEQ CR tablet Take 1 tablet by mouth Daily.       potassium chloride 10 MEQ CR tablet Take 1 tablet by mouth Daily.      sertraline (ZOLOFT) 50 MG tablet Take 1 tablet by mouth Daily.      tamsulosin (FLOMAX) 0.4 MG capsule 24 hr capsule Take 1 capsule by mouth Every Night.      triamcinolone (KENALOG) 0.5 % cream Apply  topically to the appropriate area as directed.      vitamin B-12 (VITAMIN B-12) 1000 MCG tablet Take 1 tablet by mouth Daily. 30 tablet 0    [DISCONTINUED] potassium chloride (MICRO-K) 8 MEQ CR capsule        No facility-administered encounter medications on file as of 6/19/2023.

## 2023-07-24 ENCOUNTER — TREATMENT (OUTPATIENT)
Dept: PHYSICAL THERAPY | Facility: CLINIC | Age: 78
End: 2023-07-24
Payer: MEDICARE

## 2023-07-24 DIAGNOSIS — M54.50 CHRONIC MIDLINE LOW BACK PAIN WITHOUT SCIATICA: Primary | ICD-10-CM

## 2023-07-24 DIAGNOSIS — M54.6 CHRONIC MIDLINE THORACIC BACK PAIN: ICD-10-CM

## 2023-07-24 DIAGNOSIS — M54.12 RADICULOPATHY, CERVICAL: ICD-10-CM

## 2023-07-24 DIAGNOSIS — M54.16 RADICULOPATHY, LUMBAR REGION: ICD-10-CM

## 2023-07-24 DIAGNOSIS — G89.29 CHRONIC NECK PAIN: ICD-10-CM

## 2023-07-24 DIAGNOSIS — M54.2 CHRONIC NECK PAIN: ICD-10-CM

## 2023-07-24 DIAGNOSIS — G89.29 CHRONIC MIDLINE LOW BACK PAIN WITHOUT SCIATICA: Primary | ICD-10-CM

## 2023-07-24 DIAGNOSIS — G89.29 CHRONIC MIDLINE THORACIC BACK PAIN: ICD-10-CM

## 2023-07-24 DIAGNOSIS — Z98.1 S/P CERVICAL SPINAL FUSION: ICD-10-CM

## 2023-07-24 PROCEDURE — 97113 AQUATIC THERAPY/EXERCISES: CPT | Performed by: PHYSICAL THERAPIST

## 2023-07-24 NOTE — PROGRESS NOTES
Physical Therapy Daily Treatment Note    [facility]  [address] [place] [address]  [phone] (phone)  [phone] (fax)    Patient: Bird Gallegos   : 1945  Diagnosis/ICD-10 Code:  Chronic midline low back pain without sciatica [M54.50, G89.29]  Referring practitioner: Ning Robledo MD  Date of Initial Visit: Type: THERAPY  Noted: 3/29/2023  Today's Date: 2023  Patient seen for 20 sessions             Subjective     Objective   AQUATIC EXERCISE:     Seated Bicycling X 2 min.  Water walking Forwards, Sideways and Backwards 2 laps each  Hamstring Stretch       30 sec x 2 ea w/ solid noodle under ankle  Calf Stretch                  30 sec x 2  Wall Crawl (BKTC)       30 sec x 2  Rows                            2 X 20, w/ Hydrotones  Paddle Stirs                 25/25, closed paddles  Shoulder Abd/Add       2 X 20 w/ Hydrotones  Shoulder Hz Abd/add  2 X 20, w/ Hydrotones   Step Ups 6 inch          15x ea         Abdominals - Hydrotone Pushdowns 20X  Leg Press w/ large solid noodle  2 X 20  Hip Abd/Add                20x ea  Hip Circles                   10/10  SLRs                           20x ea  Mini Squat                   15X  Toe/Heel Raises          20x  STS from pool bench    15x, No hands  Tuck Ups 20X      Assessment/Plan  Added suspended tuck ups for lower abdominal training. Bird has membership at the [facility] and plans to continue his prescribed aquatic exercises there after discharge.  He reports feeling better after treatments and has been able to increase his activity at home.  Plan: Progress to lateral step ups (6 inch)          Timed:  Aquatic Therapy    23     mins 08710;    Jair Morin, PT  Physical Therapist    [place] License:  [id number]

## 2023-07-26 ENCOUNTER — TREATMENT (OUTPATIENT)
Dept: PHYSICAL THERAPY | Facility: CLINIC | Age: 78
End: 2023-07-26
Payer: MEDICARE

## 2023-07-26 DIAGNOSIS — M54.16 RADICULOPATHY, LUMBAR REGION: ICD-10-CM

## 2023-07-26 DIAGNOSIS — M54.6 CHRONIC MIDLINE THORACIC BACK PAIN: ICD-10-CM

## 2023-07-26 DIAGNOSIS — M54.50 CHRONIC MIDLINE LOW BACK PAIN WITHOUT SCIATICA: Primary | ICD-10-CM

## 2023-07-26 DIAGNOSIS — G89.29 CHRONIC MIDLINE LOW BACK PAIN WITHOUT SCIATICA: Primary | ICD-10-CM

## 2023-07-26 DIAGNOSIS — M54.12 RADICULOPATHY, CERVICAL: ICD-10-CM

## 2023-07-26 DIAGNOSIS — G89.29 CHRONIC MIDLINE THORACIC BACK PAIN: ICD-10-CM

## 2023-07-26 DIAGNOSIS — M54.2 CHRONIC NECK PAIN: ICD-10-CM

## 2023-07-26 DIAGNOSIS — Z98.1 S/P CERVICAL SPINAL FUSION: ICD-10-CM

## 2023-07-26 DIAGNOSIS — G89.29 CHRONIC NECK PAIN: ICD-10-CM

## 2023-07-26 PROCEDURE — 97113 AQUATIC THERAPY/EXERCISES: CPT | Performed by: PHYSICAL THERAPIST

## 2023-07-26 NOTE — PROGRESS NOTES
Physical Therapy Discharge Note     [facility]  [address] [place] [address]  [phone] (phone)  [phone] (fax)    Patient: Bird Gallegos   : 1945  Diagnosis/ICD-10 Code:  Chronic midline low back pain without sciatica [M54.50, G89.29]  Referring practitioner: Ning Robledo MD  Date of Initial Visit: Type: THERAPY  Noted: 3/29/2023  Today's Date: 2023  Patient seen for 21 sessions      Subjective:     Clinical Progress: improved  Treatment has included:  aquatic therapy    Subjective Pain rated as  moderate.  Pain is worse with walking unless he leans forward, also gets relief with sitting.    Objective     Gait:  With rollator - Flexion bias with widened CARO, decreased heel strike,  L trendelenburg.  He also uses a motorized scooter.     Stairs:  Non reciprocal to enter/exit pool using rail support     Functional outcome score: Oswestry score 40%    AQUATIC EXERCISE:     Seated Bicycling X 2 min.  Water walking Forwards, Sideways and Backwards 2 laps each  Hamstring Stretch       30 sec x 2 ea w/ solid noodle under ankle  Calf Stretch                  30 sec x 2  Wall Crawl (BKTC)       30 sec x 2  Rows                            2 X 20, w/ Hydrotones  Paddle Stirs                 10/10 w/ Hydrotones   Shoulder Abd/Add       2 X 20 w/ Hydrotones  Shoulder Hz Abd/add  2 X 20, w/ Hydrotones   Step Ups 6 inch          15x ea         Abdominals - Hydrotone Pushdowns 20X  Leg Press w/ large solid noodle  2 X 20  Hip Abd/Add                20x ea  Hip Circles                   10/10  SLRs                           20x ea  Mini Squat                   15X  Toe/Heel Raises          20x  STS from pool bench    15x, No hands  Tuck Ups 20X    Assessment/Plan  Bird has been treated in aquatic therapy for 20 sessions.  He has met all the goals and is planning to continue the prescribed aquatic exercises at a [facility].  He has become more active around his house  and feels better after his treatments.  Bird has hip weakness left > right, along with episodes of his legs giving out as well as balance impairments.  Recommend new referral for balance training.    Goals  STG 1 Patient will perform aquatic therapy exercises for lumbar  ROM/flexibility, strength and conditioning without increased pain.  MET  STG 2 Patient will demonstrate good postural awareness with standing and walking in pool.  MET  STG 3 Patient will report decreased pain following aquatic therapy session from 9/10 to 7/10 for at least 12 hours.  MET     LTGs:   LTG 1 Patient will demonstrate an independent aquatic HEP for lumbar , lower extremity  and core  strength,  ROM/flexibility, conditioning and balance with community resources.  MET, plans to go to the [facility] pool after discharge  LTG 2 Patient will demonstrate increased core strength and balance with use of added resistive equipment.  MET  LTG 3 Patient will report decreased functional disability on Modified Oswestry score from 34 to 25   Met, Oswestry score today = 20/50 (40% perceived disability)             PT Signature: Jair Morin, PT  [place] License: [id number]      Based upon review of the patient's progress and continued therapy plan, it is my medical opinion that Bird Gallegos should continue physical therapy treatment at [facility]  [address] [place] [id number] [phone].    Signature: __________________________________  Ning Robledo MD  NPI: [id number]                                          Timed:  Aquatic therapy  [id number]    23   mins

## 2023-07-31 ENCOUNTER — TRANSCRIBE ORDERS (OUTPATIENT)
Dept: PHYSICAL THERAPY | Facility: CLINIC | Age: 78
End: 2023-07-31
Payer: MEDICARE

## 2023-07-31 DIAGNOSIS — R26.89 BALANCE DISORDER: Primary | ICD-10-CM

## 2023-09-14 RX ORDER — FENOFIBRATE 145 MG/1
TABLET, COATED ORAL
Qty: 90 TABLET | Refills: 3 | Status: SHIPPED | OUTPATIENT
Start: 2023-09-14

## 2024-04-09 ENCOUNTER — OFFICE VISIT (OUTPATIENT)
Dept: CARDIOLOGY | Facility: CLINIC | Age: 79
End: 2024-04-09
Payer: MEDICARE

## 2024-04-09 VITALS
HEIGHT: 67 IN | OXYGEN SATURATION: 98 % | BODY MASS INDEX: 43.66 KG/M2 | DIASTOLIC BLOOD PRESSURE: 82 MMHG | HEART RATE: 72 BPM | WEIGHT: 278.2 LBS | SYSTOLIC BLOOD PRESSURE: 140 MMHG

## 2024-04-09 DIAGNOSIS — I35.0 NONRHEUMATIC AORTIC VALVE STENOSIS: ICD-10-CM

## 2024-04-09 DIAGNOSIS — I25.10 CORONARY ARTERY DISEASE INVOLVING NATIVE CORONARY ARTERY OF NATIVE HEART WITHOUT ANGINA PECTORIS: Primary | ICD-10-CM

## 2024-04-09 DIAGNOSIS — G47.33 OSA ON CPAP: ICD-10-CM

## 2024-04-09 DIAGNOSIS — I10 PRIMARY HYPERTENSION: ICD-10-CM

## 2024-04-09 PROCEDURE — 1160F RVW MEDS BY RX/DR IN RCRD: CPT | Performed by: NURSE PRACTITIONER

## 2024-04-09 PROCEDURE — 93000 ELECTROCARDIOGRAM COMPLETE: CPT | Performed by: NURSE PRACTITIONER

## 2024-04-09 PROCEDURE — 1159F MED LIST DOCD IN RCRD: CPT | Performed by: NURSE PRACTITIONER

## 2024-04-09 PROCEDURE — 3079F DIAST BP 80-89 MM HG: CPT | Performed by: NURSE PRACTITIONER

## 2024-04-09 PROCEDURE — 99214 OFFICE O/P EST MOD 30 MIN: CPT | Performed by: NURSE PRACTITIONER

## 2024-04-09 PROCEDURE — 3077F SYST BP >= 140 MM HG: CPT | Performed by: NURSE PRACTITIONER

## 2024-04-09 RX ORDER — ISOSORBIDE MONONITRATE 30 MG/1
30 TABLET, EXTENDED RELEASE ORAL DAILY
Qty: 30 TABLET | Refills: 11 | Status: SHIPPED | OUTPATIENT
Start: 2024-04-09

## 2024-04-09 NOTE — PROGRESS NOTES
Date of Office Visit: 2024  Encounter Provider: KEVIN Cheatham  Place of Service: [facility] CARDIOLOGY  Patient Name: Bird Gallegos  :1945    Chief Complaint   Patient presents with    Follow-up    Shortness of Breath   :     HPI: Bird Gallegos is a 79 y.o. male who is a patient of Dr. Taylor and is new to me today.  He has a history of coronary artery disease.  Status post aortic valve for aortic valve stenosis.,  Obstructive sleep apnea, diabetes mellitus on oral medications and hypertension.    We first met the patient in March or  his primary care physician had noted a significant murmur had an echocardiogram that showed severe aortic stenosis with a mean gradient of 45 mmHg.  Aortic valve area was 0.51 cm² he underwent left heart cath as an outpatient that showed single-vessel coronary disease of the second OM.  Given his coronary disease and severe aortic stenosis surgeon informed traditional surgical aortic valve replacement with single-vessel graft bypass to the OM.  He did quite well until 2019 when he was diagnosed with ophthalmic stroke due to severe hypertension.  He was treated with dual antiplatelet therapy for 30 days had a Zio patch that showed no atrial arrhythmias.  He is on Plavix.  He was last seen in the office in  of last year he had some chronic lower extremity edema and quit smoking over 30 years ago.  He is a previous  and his daughter is a nurse.    He comes in today because he has been having some chest pain and discomfort.  His chest as well as shortness of breath.  He has been having episodes of the cold chills at night where his people start chattering and he gets freezing.  He went to see his primary doctor who did some labs his cholesterol is stable nothing was really unremarkable.  The last time this happened he had some chest discomfort described as an aching sensation radiating to his left arm.  It woke  him up from his sleep.  He has no exertional discomfort in his chest but is getting short of breath.  No lightheaded dizziness but has had some swelling in his legs.  Blood pressure is a little high today at 140/82.  He did not take his diuretic today because he did not want to be stuck in the bathroom.  Previous testing and notes have been reviewed by me.   Past Medical History:   Diagnosis Date    Anxiety and depression     Aortic stenosis     Arthritis     Coronary artery disease     CTS (carpal tunnel syndrome)     Degenerative joint disease (DJD) of lumbar spine     Diabetes mellitus     Diabetic neuropathy     Difficulty walking     Diverticulosis     COLVIN (dyspnea on exertion)     GERD (gastroesophageal reflux disease)     Headache, tension-type     Heart murmur     History of cellulitis     LEFT LEG    HL (hearing loss)     Hypertension     STEF (obstructive sleep apnea)     STEF on auto CPAP     Stroke     Vision loss        Past Surgical History:   Procedure Laterality Date    BACK SURGERY      X2    CARDIAC CATHETERIZATION N/A 3/21/2019    Procedure: Coronary angiography;  Surgeon: Wendy Braxton MD;  Location: [facility] CATH INVASIVE LOCATION;  Service: Cardiology    CORONARY ARTERY BYPASS GRAFT WITH AORTIC VALVE REPAIR/REPLACEMENT N/A 3/26/2019    Procedure: INTRAOP QUIQUE; CORONARY ARTERY BYPASS X 1 UTILIZING ENDOSCOPICALLY HARVESTED RIGHT GREATER SAPHENOUS VEIN; AORTIC VALVE REPLACEMENT AND PRP.;  Surgeon: Alexx Gandara MD;  Location: [facility] OR;  Service: Cardiothoracic    POSTERIOR CERVICAL FUSION      TENDON REPAIR Left     KNEE    WRIST SURGERY Bilateral        Social History     Socioeconomic History    Marital status:    Tobacco Use    Smoking status: Former     Current packs/day: 0.00     Types: Cigarettes     Quit date:      Years since quittin.2    Smokeless tobacco: Never    Tobacco comments:     Smoked between the ages of 25 and 40.   Vaping Use    Vaping status: Never Used    Substance and Sexual Activity    Alcohol use: No    Drug use: No    Sexual activity: Defer       Family History   Problem Relation Age of Onset    Heart disease Mother     Arthritis Mother     Cancer Father     Hypertension Brother     Diabetes Brother     Hypertension Brother     Parkinsonism Sister     Arthritis Sister     Heart disease Maternal Grandmother     Heart disease Maternal Grandfather     Heart disease Paternal Grandmother     Malig Hyperthermia Neg Hx        Review of Systems   Constitutional: Negative for diaphoresis and malaise/fatigue.   Cardiovascular:  Positive for chest pain and dyspnea on exertion. Negative for claudication, irregular heartbeat, leg swelling, near-syncope, orthopnea, palpitations, paroxysmal nocturnal dyspnea and syncope.   Respiratory:  Negative for cough, shortness of breath and sleep disturbances due to breathing.    Musculoskeletal:  Negative for falls.   Neurological:  Negative for dizziness and weakness.   Psychiatric/Behavioral:  Negative for altered mental status and substance abuse.        Allergies   Allergen Reactions    Contrast Dye (Echo Or Unknown Ct/Mr) Hives     Hives and rash reported    Iodine Unknown - Low Severity    Latex Hives         Current Outpatient Medications:     albuterol sulfate  (90 Base) MCG/ACT inhaler, , Disp: , Rfl:     ascorbic acid (VITAMIN C) 1000 MG tablet, Take 1 tablet by mouth Daily., Disp: , Rfl:     atorvastatin (LIPITOR) 40 MG tablet, Take 1 tablet by mouth Daily., Disp: , Rfl:     carvedilol (COREG) 25 MG tablet, Take 1 tablet by mouth Every 12 (Twelve) Hours., Disp: 60 tablet, Rfl: 2    clonazePAM (KlonoPIN) 1 MG disintegrating tablet, Take 2 tablets by mouth Every Night., Disp: , Rfl:     clopidogrel (PLAVIX) 75 MG tablet, Take 1 tablet by mouth Daily., Disp: , Rfl:     erythromycin (ROMYCIN) 5 MG/GM ophthalmic ointment, apply (1CM) by ophthalmic route every day ribbon into the lower conjunctival sac(s) in the affected  "eye(s), Disp: , Rfl:     fenofibrate (TRICOR) 145 MG tablet, TAKE 1 TABLET EVERY DAY, Disp: 90 tablet, Rfl: 3    furosemide (LASIX) 40 MG tablet, Take 2 tablets by mouth Daily., Disp: 90 tablet, Rfl: 3    gabapentin (NEURONTIN) 300 MG capsule, Take 1 capsule by mouth 3 (Three) Times a Day., Disp: , Rfl:     HYDROcodone-acetaminophen (NORCO) 5-325 MG per tablet, Take  by mouth As Needed., Disp: , Rfl:     ketoconazole (NIZORAL) 2 % shampoo, , Disp: , Rfl:     lisinopril (PRINIVIL,ZESTRIL) 20 MG tablet, Take 1 tablet by mouth Daily., Disp: , Rfl:     loratadine (CLARITIN) 10 MG tablet, Take 1 tablet by mouth Daily., Disp: , Rfl:     metFORMIN (GLUCOPHAGE) 1000 MG tablet, Take 1 tablet by mouth 2 (Two) Times a Day With Meals., Disp: , Rfl:     methocarbamol (ROBAXIN) 750 MG tablet, Take  by mouth As Needed., Disp: , Rfl:     Multiple Vitamins-Minerals (ICAPS AREDS 2 PO), Take 1 tablet by mouth 2 (Two) Times a Day., Disp: , Rfl:     olopatadine (PATANOL) 0.1 % ophthalmic solution, Apply  to eye(s) as directed by provider Daily., Disp: , Rfl:     potassium chloride (K-DUR,KLOR-CON) 10 MEQ CR tablet, Take 1 tablet by mouth Daily., Disp: , Rfl:     potassium chloride 10 MEQ CR tablet, Take 1 tablet by mouth Daily., Disp: , Rfl:     sertraline (ZOLOFT) 50 MG tablet, Take 1 tablet by mouth Daily., Disp: , Rfl:     tamsulosin (FLOMAX) 0.4 MG capsule 24 hr capsule, Take 1 capsule by mouth Every Night., Disp: , Rfl:     vitamin B-12 (VITAMIN B-12) 1000 MCG tablet, Take 1 tablet by mouth Daily., Disp: 30 tablet, Rfl: 0    isosorbide mononitrate (IMDUR) 30 MG 24 hr tablet, Take 1 tablet by mouth Daily., Disp: 30 tablet, Rfl: 11      Objective:     Vitals:    04/09/24 1428   BP: 140/82   BP Location: Left arm   Patient Position: Sitting   Cuff Size: Adult   Pulse: 72   SpO2: 98%   Weight: 126 kg (278 lb 3.2 oz)   Height: 170.2 cm (67\")     Body mass index is 43.57 kg/m².    PHYSICAL EXAM:    Constitutional:       General: Not in " acute distress.     Appearance: Normal appearance. Well-developed.   Eyes:      Pupils: Pupils are equal, round, and reactive to light.   HENT:      Head: Normocephalic.   Neck:      Vascular: No carotid bruit or JVD.   Pulmonary:      Effort: Pulmonary effort is normal. No tachypnea.      Breath sounds: Normal breath sounds. No wheezing. No rales.   Cardiovascular:      Normal rate. Regular rhythm.      No gallop.    Pulses:     Intact distal pulses.   Edema:     Peripheral edema present.     Pretibial: bilateral trace edema of the pretibial area.  Abdominal:      General: Bowel sounds are normal.      Palpations: Abdomen is soft.      Tenderness: There is no abdominal tenderness.   Musculoskeletal: Normal range of motion.      Cervical back: Normal range of motion and neck supple. No edema. Skin:     General: Skin is warm and dry.   Neurological:      Mental Status: Alert and oriented to person, place, and time.           ECG 12 Lead    Date/Time: 4/9/2024 3:14 PM  Performed by: Ning Hargrove APRN    Authorized by: Ning Hargrove APRN  Comparison: compared with previous ECG from 6/19/2023  Similar to previous ECG  Rhythm: sinus rhythm  Rate: normal  T inversion: III and aVF  QRS axis: normal  Other findings: non-specific ST-T wave changes    Clinical impression: non-specific ECG            Assessment:       Diagnosis Plan   1. Coronary artery disease involving native coronary artery of native heart without angina pectoris  Adult Transthoracic Echo Complete w/ Color, Spectral and Contrast if Necessary Per Protocol   On Plavix, statin and carvedilol.  Chest discomfort noted will order stress test he had nonobstructive disease on his cardiac cath in 2019 of the LAD and RCA did get a graft to 70% second OM at that time.  Also has diabetes high risk for coronary disease plan for stress test.  No changes on EKG.  Will add some isosorbide to see if this helps some of his symptoms. Stress Test With Myocardial  Perfusion One Day      2. Primary hypertension  Adult Transthoracic Echo Complete w/ Color, Spectral and Contrast if Necessary Per Protocol   Bp mildly elevated today but has not taken his diuretic.  Also going to start isosorbide. Stress Test With Myocardial Perfusion One Day      3. Nonrheumatic aortic valve stenosis     Will check echocardiogram status post AVR years ago 5   4. STEF on auto CPAP     Compliant with CPAP     Orders Placed This Encounter   Procedures    Stress Test With Myocardial Perfusion One Day     Standing Status:   Future     Standing Expiration Date:   4/9/2025     Order Specific Question:   What stress agent will be used?     Answer:   Regadenoson (Lexiscan)     Order Specific Question:   Difficulty walking criteria?     Answer:   Poor Exercise Tolerance     Order Specific Question:   Difficulty walking criteria?     Answer:   Musculoskeletal (hips, knees, feet, back, amputee)     Order Specific Question:   Reason for exam?     Answer:   Chest Pain     Order Specific Question:   Reason for exam?     Answer:   Known Coronary Artery Disease     Order Specific Question:   Release to patient     Answer:   Routine Release [[id number]]    ECG 12 Lead     This order was created via procedure documentation     Order Specific Question:   Release to patient     Answer:   Routine Release [[id number]]    Adult Transthoracic Echo Complete w/ Color, Spectral and Contrast if Necessary Per Protocol     Standing Status:   Future     Standing Expiration Date:   4/9/2025     Order Specific Question:   Reason for exam?     Answer:   Valvular Function     Order Specific Question:   Release to patient     Answer:   Routine Release [[id number]]          Plan:       I will call with test results follow-up in 1 month.         Your medication list            Accurate as of April 9, 2024  3:40 PM. If you have any questions, ask your nurse or doctor.                START taking these medications        Instructions Last  Dose Given Next Dose Due   isosorbide mononitrate 30 MG 24 hr tablet  Commonly known as: IMDUR  Started by: KEVIN Cheatham      Take 1 tablet by mouth Daily.              CONTINUE taking these medications        Instructions Last Dose Given Next Dose Due   albuterol sulfate  (90 Base) MCG/ACT inhaler  Commonly known as: PROVENTIL HFA;VENTOLIN HFA;PROAIR HFA           ascorbic acid 1000 MG tablet  Commonly known as: VITAMIN C      Take 1 tablet by mouth Daily.       atorvastatin 40 MG tablet  Commonly known as: LIPITOR      Take 1 tablet by mouth Daily.       carvedilol 25 MG tablet  Commonly known as: COREG      Take 1 tablet by mouth Every 12 (Twelve) Hours.       clonazePAM 1 MG disintegrating tablet  Commonly known as: KlonoPIN      Take 2 tablets by mouth Every Night.       clopidogrel 75 MG tablet  Commonly known as: PLAVIX      Take 1 tablet by mouth Daily.       cyanocobalamin 1000 MCG tablet  Commonly known as: VITAMIN B-12      Take 1 tablet by mouth Daily.       erythromycin 5 MG/GM ophthalmic ointment  Commonly known as: ROMYCIN      apply (1CM) by ophthalmic route every day ribbon into the lower conjunctival sac(s) in the affected eye(s)       fenofibrate 145 MG tablet  Commonly known as: TRICOR      TAKE 1 TABLET EVERY DAY       furosemide 40 MG tablet  Commonly known as: LASIX      Take 2 tablets by mouth Daily.       gabapentin 300 MG capsule  Commonly known as: NEURONTIN      Take 1 capsule by mouth 3 (Three) Times a Day.       HYDROcodone-acetaminophen 5-325 MG per tablet  Commonly known as: NORCO      Take  by mouth As Needed.       ICAPS AREDS 2 PO      Take 1 tablet by mouth 2 (Two) Times a Day.       ketoconazole 2 % shampoo  Commonly known as: NIZORAL           lisinopril 20 MG tablet  Commonly known as: PRINIVIL,ZESTRIL      Take 1 tablet by mouth Daily.       loratadine 10 MG tablet  Commonly known as: CLARITIN      Take 1 tablet by mouth Daily.       metFORMIN 1000 MG  tablet  Commonly known as: GLUCOPHAGE      Take 1 tablet by mouth 2 (Two) Times a Day With Meals.       methocarbamol 750 MG tablet  Commonly known as: ROBAXIN      Take  by mouth As Needed.       olopatadine 0.1 % ophthalmic solution  Commonly known as: PATANOL      Apply  to eye(s) as directed by provider Daily.       potassium chloride 10 MEQ CR tablet  Commonly known as: KLOR-CON M10      Take 1 tablet by mouth Daily.       potassium chloride 10 MEQ CR tablet      Take 1 tablet by mouth Daily.       sertraline 50 MG tablet  Commonly known as: ZOLOFT      Take 1 tablet by mouth Daily.       tamsulosin 0.4 MG capsule 24 hr capsule  Commonly known as: FLOMAX      Take 1 capsule by mouth Every Night.                 Where to Get Your Medications        These medications were sent to [facility] - [place], [place] - [address] - [phone]  - [phone]   [address], [facility] [address]      Phone: [phone]   isosorbide mononitrate 30 MG 24 hr tablet           As always, it has been a pleasure to participate in your patient's care.      Sincerely,     Ning BROWN

## 2024-04-22 ENCOUNTER — TELEPHONE (OUTPATIENT)
Dept: CARDIOLOGY | Facility: CLINIC | Age: 79
End: 2024-04-22
Payer: MEDICARE

## 2024-05-09 ENCOUNTER — OFFICE VISIT (OUTPATIENT)
Dept: SLEEP MEDICINE | Facility: HOSPITAL | Age: 79
End: 2024-05-09
Payer: MEDICARE

## 2024-05-09 VITALS — WEIGHT: 273.4 LBS | HEIGHT: 67 IN | OXYGEN SATURATION: 91 % | HEART RATE: 68 BPM | BODY MASS INDEX: 42.91 KG/M2

## 2024-05-09 DIAGNOSIS — E66.01 CLASS 3 SEVERE OBESITY DUE TO EXCESS CALORIES WITH SERIOUS COMORBIDITY AND BODY MASS INDEX (BMI) OF 40.0 TO 44.9 IN ADULT: ICD-10-CM

## 2024-05-09 DIAGNOSIS — G47.33 OSA ON CPAP: Primary | ICD-10-CM

## 2024-05-09 PROCEDURE — 1159F MED LIST DOCD IN RCRD: CPT | Performed by: INTERNAL MEDICINE

## 2024-05-09 PROCEDURE — 99213 OFFICE O/P EST LOW 20 MIN: CPT | Performed by: INTERNAL MEDICINE

## 2024-05-09 PROCEDURE — 1160F RVW MEDS BY RX/DR IN RCRD: CPT | Performed by: INTERNAL MEDICINE

## 2024-05-09 PROCEDURE — G0463 HOSPITAL OUTPT CLINIC VISIT: HCPCS

## 2024-05-09 NOTE — PROGRESS NOTES
"Follow Up Sleep Disorders Center Note     Chief Complaint:  STEF     Primary Care Physician: Ning Robledo MD    Interval History:   The patient is a 79 y.o. male  who I last saw 2023 and that note was reviewed.  The patient reports he is doing well without new complaints.  He goes to bed at 10 PM gets out of bed at 9 AM.  He will use the restroom during that time.    The patient states he has ongoing problems with his heart and lungs.    Review of Systems:    A complete review of systems was done and all were negative with the exception of some nasal congestion postnasal drip, shortness of breath with exertion, atypical chest pain, occasional cough and some chills, and anxiety    Social History:    Social History     Socioeconomic History    Marital status:    Tobacco Use    Smoking status: Former     Current packs/day: 0.00     Types: Cigarettes     Quit date:      Years since quittin.3    Smokeless tobacco: Never    Tobacco comments:     Smoked between the ages of 25 and 40.   Vaping Use    Vaping status: Never Used   Substance and Sexual Activity    Alcohol use: No    Drug use: No    Sexual activity: Defer       Allergies:  Contrast dye (echo or unknown ct/mr), Iodine, and Latex     Medication Review:  Reviewed.      Vital Signs:    Vitals:    24 1100   Pulse: 68   SpO2: 91%   Weight: 124 kg (273 lb 6.4 oz)   Height: 170.2 cm (67\")     Body mass index is 42.82 kg/m².    Physical Exam:    Constitutional:  Well developed 79 y.o. male that appears in no apparent distress.  Awake & oriented times 3.  Normal mood with normal recent and remote memory and normal judgement.  Eyes:  Conjunctivae normal.  Oropharynx: Previously, moist mucous membranes without exudate and a large tongue and uvula and narrowed posterior pharyngeal opening and class II-3 Mallampati airway.    Self-administered [place] Sleepiness Scale test results: 3, previously 2  0-5 Lower normal daytime sleepiness  6-10 Higher " normal daytime sleepiness  11-12 Mild, 13-15 Moderate, & 16-24 Severe excessive daytime sleepiness     Downloaded PAP Data Evaluated For Therapeutic Response and Compliance:  DME is [facility] and he uses a fullface mask.  Downloads between 2/7 and 5/6/2024 compliance 100%.  Average usage is 9 hours and 7 minutes.  Average AHI is normal without leak.  Average auto CPAP pressure is 16 and his DreamStation 2 auto CPAP is set at 15-20    I have reviewed the above results and compared them with the patient's last downloads and reviewed with the patient.    Impression:   Obstructive sleep apnea adequately treated with DreamStation 2 auto CPAP. The patient appears to be at goal with good compliance and usage. The patient has no complaints of hypersomnolence.     Plan:  Good sleep hygiene measures should be maintained.  Weight loss would be beneficial in this patient who has class III severe obesity by Body mass index is 42.82 kg/m².      After evaluating the patient and assessing results available, the patient is benefiting from the treatment being provided.     The patient will continue DreamStation 2 auto CPAP.  Potential side effects of not using PAP therapy reviewed and addressed as needed.  After clinical evaluation and review of downloads, I recommend no changes to the patient's pressures.  A new prescription will be sent to the patient's DME.    I answered all of the patient's questions.  The patient will call the Sleep Disorder Center for any problems and will follow up in 1 year.      Jensen Mas MD  Sleep Medicine  05/09/24  11:41 EDT

## 2024-05-10 ENCOUNTER — TELEPHONE (OUTPATIENT)
Dept: CARDIOLOGY | Facility: CLINIC | Age: 79
End: 2024-05-10
Payer: MEDICARE

## 2024-05-13 ENCOUNTER — HOSPITAL ENCOUNTER (OUTPATIENT)
Dept: CARDIOLOGY | Facility: HOSPITAL | Age: 79
Discharge: HOME | End: 2024-05-13
Payer: MEDICARE

## 2024-05-13 ENCOUNTER — TELEPHONE (OUTPATIENT)
Dept: CARDIOLOGY | Facility: CLINIC | Age: 79
End: 2024-05-13
Payer: MEDICARE

## 2024-05-13 ENCOUNTER — HOSPITAL ENCOUNTER (OUTPATIENT)
Dept: CARDIOLOGY | Facility: HOSPITAL | Age: 79
Discharge: HOME | End: 2024-05-13
Admitting: NURSE PRACTITIONER
Payer: MEDICARE

## 2024-05-13 VITALS
HEIGHT: 67 IN | BODY MASS INDEX: 42.85 KG/M2 | SYSTOLIC BLOOD PRESSURE: 160 MMHG | WEIGHT: 273 LBS | DIASTOLIC BLOOD PRESSURE: 80 MMHG

## 2024-05-13 VITALS — HEIGHT: 67 IN | BODY MASS INDEX: 43.25 KG/M2 | WEIGHT: 275.57 LBS

## 2024-05-13 DIAGNOSIS — I10 PRIMARY HYPERTENSION: ICD-10-CM

## 2024-05-13 DIAGNOSIS — I25.10 CORONARY ARTERY DISEASE INVOLVING NATIVE CORONARY ARTERY OF NATIVE HEART WITHOUT ANGINA PECTORIS: ICD-10-CM

## 2024-05-13 PROBLEM — G47.14 HYPERSOMNIA DUE TO ANOTHER MEDICAL CONDITION: Status: RESOLVED | Noted: 2017-12-10 | Resolved: 2024-05-13

## 2024-05-13 LAB
AORTIC DIMENSIONLESS INDEX: 0.4 (DI)
ASCENDING AORTA: 3.5 CM
BH CV ECHO AV AORTIC VALVE AT ACCEL TIME CALCULATED: NORMAL MSEC
BH CV ECHO MEAS - AO MAX PG: 24.4 MMHG
BH CV ECHO MEAS - AO MEAN PG: 11.7 MMHG
BH CV ECHO MEAS - AO ROOT DIAM: 3.3 CM
BH CV ECHO MEAS - AO V2 MAX: 247 CM/SEC
BH CV ECHO MEAS - AO V2 VTI: 59.7 CM
BH CV ECHO MEAS - AT: 114 SEC
BH CV ECHO MEAS - AVA(I,D): 1.19 CM2
BH CV ECHO MEAS - EDV(CUBED): 129.2 ML
BH CV ECHO MEAS - EDV(MOD-SP2): 103 ML
BH CV ECHO MEAS - EDV(MOD-SP4): 109 ML
BH CV ECHO MEAS - EF(MOD-BP): 62.7 %
BH CV ECHO MEAS - EF(MOD-SP2): 63.1 %
BH CV ECHO MEAS - EF(MOD-SP4): 64.2 %
BH CV ECHO MEAS - ESV(CUBED): 34.3 ML
BH CV ECHO MEAS - ESV(MOD-SP2): 38 ML
BH CV ECHO MEAS - ESV(MOD-SP4): 39 ML
BH CV ECHO MEAS - FS: 35.7 %
BH CV ECHO MEAS - IVS/LVPW: 0.95 CM
BH CV ECHO MEAS - IVSD: 0.9 CM
BH CV ECHO MEAS - LAT PEAK E' VEL: 8.2 CM/SEC
BH CV ECHO MEAS - LV DIASTOLIC VOL/BSA (35-75): 47.2 CM2
BH CV ECHO MEAS - LV MASS(C)D: 167.3 GRAMS
BH CV ECHO MEAS - LV MAX PG: 3.2 MMHG
BH CV ECHO MEAS - LV MEAN PG: 1.89 MMHG
BH CV ECHO MEAS - LV SYSTOLIC VOL/BSA (12-30): 16.9 CM2
BH CV ECHO MEAS - LV V1 MAX: 89 CM/SEC
BH CV ECHO MEAS - LV V1 VTI: 23.5 CM
BH CV ECHO MEAS - LVIDD: 5.1 CM
BH CV ECHO MEAS - LVIDS: 3.2 CM
BH CV ECHO MEAS - LVOT AREA: 3 CM2
BH CV ECHO MEAS - LVOT DIAM: 1.96 CM
BH CV ECHO MEAS - LVPWD: 0.95 CM
BH CV ECHO MEAS - MED PEAK E' VEL: 6.2 CM/SEC
BH CV ECHO MEAS - MV A DUR: 0.16 SEC
BH CV ECHO MEAS - MV A MAX VEL: 90.7 CM/SEC
BH CV ECHO MEAS - MV DEC SLOPE: 644.6 CM/SEC2
BH CV ECHO MEAS - MV DEC TIME: 0.26 SEC
BH CV ECHO MEAS - MV E MAX VEL: 83.9 CM/SEC
BH CV ECHO MEAS - MV E/A: 0.93
BH CV ECHO MEAS - MV MAX PG: 7 MMHG
BH CV ECHO MEAS - MV MEAN PG: 2.9 MMHG
BH CV ECHO MEAS - MV P1/2T: 56.7 MSEC
BH CV ECHO MEAS - MV V2 VTI: 34.1 CM
BH CV ECHO MEAS - MVA(P1/2T): 3.9 CM2
BH CV ECHO MEAS - MVA(VTI): 2.08 CM2
BH CV ECHO MEAS - PA ACC TIME: 0.18 SEC
BH CV ECHO MEAS - PA V2 MAX: 106.4 CM/SEC
BH CV ECHO MEAS - PULM A REVS DUR: 0.13 SEC
BH CV ECHO MEAS - PULM A REVS VEL: 23.1 CM/SEC
BH CV ECHO MEAS - PULM DIAS VEL: 36.2 CM/SEC
BH CV ECHO MEAS - PULM S/D: 1.39
BH CV ECHO MEAS - PULM SYS VEL: 50.1 CM/SEC
BH CV ECHO MEAS - RAP SYSTOLE: 3 MMHG
BH CV ECHO MEAS - RV MAX PG: 5.9 MMHG
BH CV ECHO MEAS - RV V1 MAX: 121.2 CM/SEC
BH CV ECHO MEAS - RV V1 VTI: 27.6 CM
BH CV ECHO MEAS - RVSP: 45 MMHG
BH CV ECHO MEAS - SV(LVOT): 70.9 ML
BH CV ECHO MEAS - SV(MOD-SP2): 65 ML
BH CV ECHO MEAS - SV(MOD-SP4): 70 ML
BH CV ECHO MEAS - SVI(LVOT): 30.7 ML/M2
BH CV ECHO MEAS - SVI(MOD-SP2): 28.2 ML/M2
BH CV ECHO MEAS - SVI(MOD-SP4): 30.3 ML/M2
BH CV ECHO MEAS - TAPSE (>1.6): 2.09 CM
BH CV ECHO MEAS - TR MAX PG: 42 MMHG
BH CV ECHO MEAS - TR MAX VEL: 324.1 CM/SEC
BH CV ECHO MEASUREMENTS AVERAGE E/E' RATIO: 11.65
BH CV NUCLEAR PRIOR STUDY: 2
BH CV REST NUCLEAR ISOTOPE DOSE: 48.5 MCI
BH CV STRESS BP STAGE 1: NORMAL
BH CV STRESS COMMENTS STAGE 1: NORMAL
BH CV STRESS DOSE REGADENOSON STAGE 1: 0.4
BH CV STRESS DURATION MIN STAGE 1: 0
BH CV STRESS DURATION SEC STAGE 1: 10
BH CV STRESS HR STAGE 1: 77
BH CV STRESS NUCLEAR ISOTOPE DOSE: 48.5 MCI
BH CV STRESS PROTOCOL 1: NORMAL
BH CV STRESS RECOVERY BP: NORMAL MMHG
BH CV STRESS RECOVERY HR: 75 BPM
BH CV STRESS STAGE 1: 1
BH CV XLRA - RV BASE: 3 CM
BH CV XLRA - RV LENGTH: 7.2 CM
BH CV XLRA - RV MID: 2.32 CM
BH CV XLRA - TDI S': 10.6 CM/SEC
LEFT ATRIUM VOLUME INDEX: 29.6 ML/M2
LV EF NUC BP: 70 %
MAXIMAL PREDICTED HEART RATE: 139 BPM
PERCENT MAX PREDICTED HR: 55.4 %
SINUS: 2.9 CM
STJ: 2.7 CM
STRESS BASELINE BP: NORMAL MMHG
STRESS BASELINE HR: 68 BPM
STRESS PERCENT HR: 65 %
STRESS POST EXERCISE DUR SEC: 10 SEC
STRESS POST PEAK BP: NORMAL MMHG
STRESS POST PEAK HR: 77 BPM
STRESS TARGET HR: 118 BPM

## 2024-05-13 PROCEDURE — 25010000002 REGADENOSON 0.4 MG/5ML SOLUTION: Performed by: NURSE PRACTITIONER

## 2024-05-13 PROCEDURE — 93306 TTE W/DOPPLER COMPLETE: CPT | Performed by: INTERNAL MEDICINE

## 2024-05-13 PROCEDURE — 78492 MYOCRD IMG PET MLT RST&STRS: CPT

## 2024-05-13 PROCEDURE — 93016 CV STRESS TEST SUPVJ ONLY: CPT | Performed by: INTERNAL MEDICINE

## 2024-05-13 PROCEDURE — 93017 CV STRESS TEST TRACING ONLY: CPT

## 2024-05-13 PROCEDURE — 78492 MYOCRD IMG PET MLT RST&STRS: CPT | Performed by: INTERNAL MEDICINE

## 2024-05-13 PROCEDURE — 93018 CV STRESS TEST I&R ONLY: CPT | Performed by: INTERNAL MEDICINE

## 2024-05-13 PROCEDURE — 93306 TTE W/DOPPLER COMPLETE: CPT

## 2024-05-13 PROCEDURE — 0 RUBIDIUM CHLORIDE: Performed by: NURSE PRACTITIONER

## 2024-05-13 PROCEDURE — A9555 RB82 RUBIDIUM: HCPCS | Performed by: NURSE PRACTITIONER

## 2024-05-13 RX ORDER — REGADENOSON 0.08 MG/ML
0.4 INJECTION, SOLUTION INTRAVENOUS
Status: COMPLETED | OUTPATIENT
Start: 2024-05-13 | End: 2024-05-13

## 2024-05-13 RX ADMIN — REGADENOSON 0.4 MG: 0.08 INJECTION, SOLUTION INTRAVENOUS at 12:43

## 2024-05-13 NOTE — TELEPHONE ENCOUNTER
Pt called back in to triage.  Results reviewed with pt.  Instructed to call with any further questions or concerns.  Verbalized understanding.    Miladis Brock RN  Triage Nurse, [facility]  05/13/24 13:36 EDT

## 2024-05-13 NOTE — TELEPHONE ENCOUNTER
ECHO RESULTS  Ning Hargrove APRN Macy, Katie, RN  Cc: Samira Bautista, SWAPNA; Stefani Siu RN  Let patient know that his aortic valve looks stable and the pumping strength of his heart is normal Cyclocort.

## 2024-05-13 NOTE — TELEPHONE ENCOUNTER
Called and left VM. Will continue to try to reach patient. HUB transfer call to triage.     Samira Bautista RN  Triage [facility]

## 2024-05-14 ENCOUNTER — TELEPHONE (OUTPATIENT)
Dept: CARDIOLOGY | Facility: CLINIC | Age: 79
End: 2024-05-14
Payer: MEDICARE

## 2024-05-14 NOTE — TELEPHONE ENCOUNTER
Notified patient of results. Patient verbalized understanding.    Samira Bautista RN  Triage [facility]

## 2024-05-14 NOTE — TELEPHONE ENCOUNTER
Ning Hargrove, Bushra Delgado, SWAPNA  Cc: Samira Bautista, SWAPNA; Stefani Siu, RN  Please let patient know that his stress test was normal thanks.

## 2024-05-14 NOTE — TELEPHONE ENCOUNTER
Called and left VM. Will continue to try to reach patient. HUB transfer call to triage.     Samira Bautista RN  Triage [facility] [place]

## 2024-11-01 ENCOUNTER — APPOINTMENT (OUTPATIENT)
Dept: GENERAL RADIOLOGY | Facility: HOSPITAL | Age: 79
End: 2024-11-01
Payer: MEDICARE

## 2024-11-01 ENCOUNTER — APPOINTMENT (OUTPATIENT)
Dept: CT IMAGING | Facility: HOSPITAL | Age: 79
End: 2024-11-01
Payer: MEDICARE

## 2024-11-01 ENCOUNTER — HOSPITAL ENCOUNTER (OUTPATIENT)
Facility: HOSPITAL | Age: 79
Setting detail: OBSERVATION
Discharge: HOME | End: 2024-11-04
Attending: EMERGENCY MEDICINE | Admitting: INTERNAL MEDICINE
Payer: MEDICARE

## 2024-11-01 DIAGNOSIS — Z86.79 HISTORY OF CAD (CORONARY ARTERY DISEASE): ICD-10-CM

## 2024-11-01 DIAGNOSIS — I16.0 HYPERTENSIVE URGENCY: ICD-10-CM

## 2024-11-01 DIAGNOSIS — R73.9 HYPERGLYCEMIA: ICD-10-CM

## 2024-11-01 DIAGNOSIS — I10 HYPERTENSION, UNSPECIFIED TYPE: ICD-10-CM

## 2024-11-01 DIAGNOSIS — R29.90 STROKE-LIKE SYMPTOMS: Primary | ICD-10-CM

## 2024-11-01 PROBLEM — Z86.73 HISTORY OF STROKE: Status: ACTIVE | Noted: 2024-11-01

## 2024-11-01 LAB
ABO GROUP BLD: NORMAL
ALBUMIN SERPL-MCNC: 4.3 G/DL (ref 3.5–5.2)
ALBUMIN/GLOB SERPL: 1.4 G/DL
ALP SERPL-CCNC: 72 U/L (ref 39–117)
ALT SERPL W P-5'-P-CCNC: 18 U/L (ref 1–41)
ANION GAP SERPL CALCULATED.3IONS-SCNC: 12 MMOL/L (ref 5–15)
APTT PPP: 32.4 SECONDS (ref 22.7–35.4)
AST SERPL-CCNC: 23 U/L (ref 1–40)
BASOPHILS # BLD AUTO: 0.04 10*3/MM3 (ref 0–0.2)
BASOPHILS NFR BLD AUTO: 0.5 % (ref 0–1.5)
BILIRUB SERPL-MCNC: 0.4 MG/DL (ref 0–1.2)
BLD GP AB SCN SERPL QL: NEGATIVE
BUN SERPL-MCNC: 15 MG/DL (ref 8–23)
BUN/CREAT SERPL: 13.8 (ref 7–25)
CALCIUM SPEC-SCNC: 10 MG/DL (ref 8.6–10.5)
CHLORIDE SERPL-SCNC: 102 MMOL/L (ref 98–107)
CO2 SERPL-SCNC: 29 MMOL/L (ref 22–29)
CREAT SERPL-MCNC: 1.09 MG/DL (ref 0.76–1.27)
DEPRECATED RDW RBC AUTO: 43 FL (ref 37–54)
EGFRCR SERPLBLD CKD-EPI 2021: 68.2 ML/MIN/1.73
EOSINOPHIL # BLD AUTO: 0.32 10*3/MM3 (ref 0–0.4)
EOSINOPHIL NFR BLD AUTO: 4.2 % (ref 0.3–6.2)
ERYTHROCYTE [DISTWIDTH] IN BLOOD BY AUTOMATED COUNT: 14.1 % (ref 12.3–15.4)
GLOBULIN UR ELPH-MCNC: 3.1 GM/DL
GLUCOSE BLDC GLUCOMTR-MCNC: 156 MG/DL (ref 70–130)
GLUCOSE SERPL-MCNC: 111 MG/DL (ref 65–99)
HCT VFR BLD AUTO: 42.7 % (ref 37.5–51)
HGB BLD-MCNC: 14.2 G/DL (ref 13–17.7)
HOLD SPECIMEN: NORMAL
IMM GRANULOCYTES # BLD AUTO: 0.01 10*3/MM3 (ref 0–0.05)
IMM GRANULOCYTES NFR BLD AUTO: 0.1 % (ref 0–0.5)
INR PPP: 1 (ref 0.9–1.1)
LYMPHOCYTES # BLD AUTO: 1.62 10*3/MM3 (ref 0.7–3.1)
LYMPHOCYTES NFR BLD AUTO: 21 % (ref 19.6–45.3)
MCH RBC QN AUTO: 28.6 PG (ref 26.6–33)
MCHC RBC AUTO-ENTMCNC: 33.3 G/DL (ref 31.5–35.7)
MCV RBC AUTO: 86.1 FL (ref 79–97)
MONOCYTES # BLD AUTO: 0.66 10*3/MM3 (ref 0.1–0.9)
MONOCYTES NFR BLD AUTO: 8.6 % (ref 5–12)
NEUTROPHILS NFR BLD AUTO: 5.06 10*3/MM3 (ref 1.7–7)
NEUTROPHILS NFR BLD AUTO: 65.6 % (ref 42.7–76)
NRBC BLD AUTO-RTO: 0 /100 WBC (ref 0–0.2)
PLATELET # BLD AUTO: 230 10*3/MM3 (ref 140–450)
PMV BLD AUTO: 11.4 FL (ref 6–12)
POTASSIUM SERPL-SCNC: 4.1 MMOL/L (ref 3.5–5.2)
PROT SERPL-MCNC: 7.4 G/DL (ref 6–8.5)
PROTHROMBIN TIME: 13.4 SECONDS (ref 11.7–14.2)
RBC # BLD AUTO: 4.96 10*6/MM3 (ref 4.14–5.8)
RH BLD: NEGATIVE
SODIUM SERPL-SCNC: 143 MMOL/L (ref 136–145)
T&S EXPIRATION DATE: NORMAL
TROPONIN T SERPL HS-MCNC: 21 NG/L
WBC NRBC COR # BLD AUTO: 7.71 10*3/MM3 (ref 3.4–10.8)
WHOLE BLOOD HOLD COAG: NORMAL
WHOLE BLOOD HOLD SPECIMEN: NORMAL

## 2024-11-01 PROCEDURE — 85730 THROMBOPLASTIN TIME PARTIAL: CPT | Performed by: EMERGENCY MEDICINE

## 2024-11-01 PROCEDURE — 71045 X-RAY EXAM CHEST 1 VIEW: CPT

## 2024-11-01 PROCEDURE — G0378 HOSPITAL OBSERVATION PER HR: HCPCS

## 2024-11-01 PROCEDURE — 99285 EMERGENCY DEPT VISIT HI MDM: CPT

## 2024-11-01 PROCEDURE — 70496 CT ANGIOGRAPHY HEAD: CPT

## 2024-11-01 PROCEDURE — 93005 ELECTROCARDIOGRAM TRACING: CPT | Performed by: EMERGENCY MEDICINE

## 2024-11-01 PROCEDURE — 25510000001 IOPAMIDOL PER 1 ML: Performed by: EMERGENCY MEDICINE

## 2024-11-01 PROCEDURE — 80053 COMPREHEN METABOLIC PANEL: CPT | Performed by: EMERGENCY MEDICINE

## 2024-11-01 PROCEDURE — 25010000002 LABETALOL 5 MG/ML SOLUTION: Performed by: EMERGENCY MEDICINE

## 2024-11-01 PROCEDURE — 85025 COMPLETE CBC W/AUTO DIFF WBC: CPT | Performed by: EMERGENCY MEDICINE

## 2024-11-01 PROCEDURE — 25010000002 DIPHENHYDRAMINE PER 50 MG

## 2024-11-01 PROCEDURE — 0042T HC CT CEREBRAL PERFUSION W/WO CONTRAST: CPT

## 2024-11-01 PROCEDURE — 99291 CRITICAL CARE FIRST HOUR: CPT

## 2024-11-01 PROCEDURE — 25010000002 METHYLPREDNISOLONE PER 125 MG

## 2024-11-01 PROCEDURE — 85610 PROTHROMBIN TIME: CPT | Performed by: EMERGENCY MEDICINE

## 2024-11-01 PROCEDURE — 86901 BLOOD TYPING SEROLOGIC RH(D): CPT | Performed by: EMERGENCY MEDICINE

## 2024-11-01 PROCEDURE — 96374 THER/PROPH/DIAG INJ IV PUSH: CPT

## 2024-11-01 PROCEDURE — 82948 REAGENT STRIP/BLOOD GLUCOSE: CPT

## 2024-11-01 PROCEDURE — 86850 RBC ANTIBODY SCREEN: CPT | Performed by: EMERGENCY MEDICINE

## 2024-11-01 PROCEDURE — 84484 ASSAY OF TROPONIN QUANT: CPT | Performed by: EMERGENCY MEDICINE

## 2024-11-01 PROCEDURE — 86900 BLOOD TYPING SEROLOGIC ABO: CPT | Performed by: EMERGENCY MEDICINE

## 2024-11-01 PROCEDURE — 96375 TX/PRO/DX INJ NEW DRUG ADDON: CPT

## 2024-11-01 PROCEDURE — 70498 CT ANGIOGRAPHY NECK: CPT

## 2024-11-01 RX ORDER — IOPAMIDOL 755 MG/ML
150 INJECTION, SOLUTION INTRAVASCULAR
Status: COMPLETED | OUTPATIENT
Start: 2024-11-01 | End: 2024-11-01

## 2024-11-01 RX ORDER — DIPHENHYDRAMINE HYDROCHLORIDE 50 MG/ML
50 INJECTION INTRAMUSCULAR; INTRAVENOUS
Status: COMPLETED | OUTPATIENT
Start: 2024-11-01 | End: 2024-11-01

## 2024-11-01 RX ORDER — SODIUM CHLORIDE 0.9 % (FLUSH) 0.9 %
10 SYRINGE (ML) INJECTION AS NEEDED
Status: DISCONTINUED | OUTPATIENT
Start: 2024-11-01 | End: 2024-11-04 | Stop reason: HOSPADM

## 2024-11-01 RX ORDER — METHYLPREDNISOLONE SODIUM SUCCINATE 40 MG/ML
40 INJECTION, POWDER, LYOPHILIZED, FOR SOLUTION INTRAMUSCULAR; INTRAVENOUS EVERY 4 HOURS
Status: DISPENSED | OUTPATIENT
Start: 2024-11-01 | End: 2024-11-02

## 2024-11-01 RX ORDER — CETIRIZINE HYDROCHLORIDE 10 MG/1
10 TABLET ORAL DAILY
Status: DISCONTINUED | OUTPATIENT
Start: 2024-11-02 | End: 2024-11-04 | Stop reason: HOSPADM

## 2024-11-01 RX ORDER — DIPHENHYDRAMINE HYDROCHLORIDE 50 MG/ML
INJECTION INTRAMUSCULAR; INTRAVENOUS
Status: COMPLETED
Start: 2024-11-01 | End: 2024-11-01

## 2024-11-01 RX ORDER — FAMOTIDINE 10 MG/ML
INJECTION, SOLUTION INTRAVENOUS
Status: COMPLETED
Start: 2024-11-01 | End: 2024-11-01

## 2024-11-01 RX ORDER — GLIMEPIRIDE 1 MG/1
0.5 TABLET ORAL
COMMUNITY

## 2024-11-01 RX ORDER — ONDANSETRON 2 MG/ML
4 INJECTION INTRAMUSCULAR; INTRAVENOUS EVERY 6 HOURS PRN
Status: DISCONTINUED | OUTPATIENT
Start: 2024-11-01 | End: 2024-11-04 | Stop reason: HOSPADM

## 2024-11-01 RX ORDER — ATORVASTATIN CALCIUM 80 MG/1
80 TABLET, FILM COATED ORAL NIGHTLY
Status: DISCONTINUED | OUTPATIENT
Start: 2024-11-01 | End: 2024-11-02

## 2024-11-01 RX ORDER — ASPIRIN 300 MG/1
300 SUPPOSITORY RECTAL DAILY
Status: DISCONTINUED | OUTPATIENT
Start: 2024-11-02 | End: 2024-11-02

## 2024-11-01 RX ORDER — BISACODYL 10 MG
10 SUPPOSITORY, RECTAL RECTAL DAILY PRN
Status: DISCONTINUED | OUTPATIENT
Start: 2024-11-01 | End: 2024-11-04 | Stop reason: HOSPADM

## 2024-11-01 RX ORDER — ACETAMINOPHEN 325 MG/1
650 TABLET ORAL EVERY 4 HOURS PRN
Status: DISCONTINUED | OUTPATIENT
Start: 2024-11-01 | End: 2024-11-04 | Stop reason: HOSPADM

## 2024-11-01 RX ORDER — LABETALOL HYDROCHLORIDE 5 MG/ML
10 INJECTION, SOLUTION INTRAVENOUS EVERY 6 HOURS PRN
Status: DISCONTINUED | OUTPATIENT
Start: 2024-11-01 | End: 2024-11-04 | Stop reason: HOSPADM

## 2024-11-01 RX ORDER — GABAPENTIN 300 MG/1
300 CAPSULE ORAL 3 TIMES DAILY
Status: DISCONTINUED | OUTPATIENT
Start: 2024-11-01 | End: 2024-11-04 | Stop reason: HOSPADM

## 2024-11-01 RX ORDER — ALBUTEROL SULFATE 0.83 MG/ML
2.5 SOLUTION RESPIRATORY (INHALATION) EVERY 6 HOURS PRN
Status: DISCONTINUED | OUTPATIENT
Start: 2024-11-01 | End: 2024-11-04 | Stop reason: HOSPADM

## 2024-11-01 RX ORDER — CARVEDILOL 25 MG/1
25 TABLET ORAL EVERY 12 HOURS
Status: DISCONTINUED | OUTPATIENT
Start: 2024-11-01 | End: 2024-11-04 | Stop reason: HOSPADM

## 2024-11-01 RX ORDER — ACETAMINOPHEN 650 MG/1
650 SUPPOSITORY RECTAL EVERY 4 HOURS PRN
Status: DISCONTINUED | OUTPATIENT
Start: 2024-11-01 | End: 2024-11-04 | Stop reason: HOSPADM

## 2024-11-01 RX ORDER — CLOPIDOGREL BISULFATE 75 MG/1
75 TABLET ORAL DAILY
Status: DISCONTINUED | OUTPATIENT
Start: 2024-11-02 | End: 2024-11-04 | Stop reason: HOSPADM

## 2024-11-01 RX ORDER — LABETALOL HYDROCHLORIDE 5 MG/ML
10 INJECTION, SOLUTION INTRAVENOUS ONCE
Status: COMPLETED | OUTPATIENT
Start: 2024-11-01 | End: 2024-11-01

## 2024-11-01 RX ORDER — KETOTIFEN FUMARATE 0.35 MG/ML
1 SOLUTION/ DROPS OPHTHALMIC 2 TIMES DAILY
Status: DISCONTINUED | OUTPATIENT
Start: 2024-11-02 | End: 2024-11-04 | Stop reason: HOSPADM

## 2024-11-01 RX ORDER — CLONAZEPAM 0.5 MG/1
2 TABLET ORAL NIGHTLY
Status: DISCONTINUED | OUTPATIENT
Start: 2024-11-01 | End: 2024-11-04 | Stop reason: HOSPADM

## 2024-11-01 RX ORDER — TAMSULOSIN HYDROCHLORIDE 0.4 MG/1
0.4 CAPSULE ORAL NIGHTLY
Status: DISCONTINUED | OUTPATIENT
Start: 2024-11-01 | End: 2024-11-04 | Stop reason: HOSPADM

## 2024-11-01 RX ORDER — ASPIRIN 325 MG
325 TABLET ORAL DAILY
Status: DISCONTINUED | OUTPATIENT
Start: 2024-11-02 | End: 2024-11-02

## 2024-11-01 RX ORDER — METHYLPREDNISOLONE SODIUM SUCCINATE 125 MG/2ML
INJECTION, POWDER, LYOPHILIZED, FOR SOLUTION INTRAMUSCULAR; INTRAVENOUS
Status: COMPLETED
Start: 2024-11-01 | End: 2024-11-01

## 2024-11-01 RX ADMIN — LABETALOL HYDROCHLORIDE 10 MG: 5 INJECTION, SOLUTION INTRAVENOUS at 20:05

## 2024-11-01 RX ADMIN — METHYLPREDNISOLONE SODIUM SUCCINATE 125 MG: 125 INJECTION INTRAMUSCULAR; INTRAVENOUS at 17:45

## 2024-11-01 RX ADMIN — FAMOTIDINE 20 MG: 10 INJECTION INTRAVENOUS at 17:44

## 2024-11-01 RX ADMIN — IOPAMIDOL 150 ML: 755 INJECTION, SOLUTION INTRAVENOUS at 17:34

## 2024-11-01 RX ADMIN — DIPHENHYDRAMINE HYDROCHLORIDE 50 MG: 50 INJECTION INTRAMUSCULAR; INTRAVENOUS at 17:44

## 2024-11-01 RX ADMIN — DIPHENHYDRAMINE HYDROCHLORIDE 50 MG: 50 INJECTION, SOLUTION INTRAMUSCULAR; INTRAVENOUS at 17:44

## 2024-11-01 NOTE — ED NOTES
Nursing report ED to floor  Bird Gallegos  79 y.o.  male    HPI :  HPI  Stated Reason for Visit: lkn 1430. dizzy, numbness L side  History Obtained From: EMS, patient    Chief Complaint  Chief Complaint   Patient presents with    Dizziness       Admitting doctor:   Jasbir Castañeda MD    Admitting diagnosis:   The primary encounter diagnosis was Stroke-like symptoms. Diagnoses of Hypertension, unspecified type, Hyperglycemia, and History of CAD (coronary artery disease) were also pertinent to this visit.    Code status:   Current Code Status       Date Active Code Status Order ID Comments User Context       Prior            Allergies:   Contrast dye (echo or unknown ct/mr), Iodine, and Latex    Isolation:   No active isolations    Intake and Output  No intake or output data in the 24 hours ending 11/01/24 1922    Weight:   There were no vitals filed for this visit.    Most recent vitals:   Vitals:    11/01/24 1709 11/01/24 1806 11/01/24 1811 11/01/24 1849   BP: (!) 194/81  (!) 202/113 (!) 212/97   Pulse: 59  67 68   Resp: 20      Temp:  98.2 °F (36.8 °C)     TempSrc:  Oral     SpO2: 94%  99% 98%       Active LDAs/IV Access:   Lines, Drains & Airways       Active LDAs       Name Placement date Placement time Site Days    Peripheral IV 09/21/20 1638 Right Antecubital 09/21/20  1638  Antecubital  1502    Peripheral IV 11/01/24 1706 Right Antecubital 11/01/24  1706  Antecubital  less than 1                    Labs (abnormal labs have a star):   Labs Reviewed   COMPREHENSIVE METABOLIC PANEL - Abnormal; Notable for the following components:       Result Value    Glucose 111 (*)     All other components within normal limits    Narrative:     GFR Normal >60  Chronic Kidney Disease <60  Kidney Failure <15    The GFR formula is only valid for adults with stable renal function between ages 18 and 70.   PROTIME-INR - Normal   APTT - Normal   SINGLE HS TROPONIN T - Normal    Narrative:     High Sensitive Troponin T Reference  Range:  <14.0 ng/L- Negative Female for AMI  <22.0 ng/L- Negative Male for AMI  >=14 - Abnormal Female indicating possible myocardial injury.  >=22 - Abnormal Male indicating possible myocardial injury.   Clinicians would have to utilize clinical acumen, EKG, Troponin, and serial changes to determine if it is an Acute Myocardial Infarction or myocardial injury due to an underlying chronic condition.        CBC WITH AUTO DIFFERENTIAL - Normal   RAINBOW DRAW    Narrative:     The following orders were created for panel order [place] Draw.  Procedure                               Abnormality         Status                     ---------                               -----------         ------                     Green Top (Gel)[[id number]]                                  Final result               Lavender Top[[id number]]                                     Final result               Gold Top - SST[[id number]]                                                              Light Blue Top[[id number]]                                   Final result                 Please view results for these tests on the individual orders.   POCT GLUCOSE FINGERSTICK   TYPE AND SCREEN   GREEN TOP   LAVENDER TOP   LIGHT BLUE TOP   CBC AND DIFFERENTIAL    Narrative:     The following orders were created for panel order CBC & Differential.  Procedure                               Abnormality         Status                     ---------                               -----------         ------                     CBC Auto Differential[[id number]]        Normal              Final result                 Please view results for these tests on the individual orders.   GOLD TOP - SST       EKG:   ECG 12 Lead Stroke Evaluation    (Results Pending)       Meds given in ED:   Medications   sodium chloride 0.9 % flush 10 mL (has no administration in time range)   methylPREDNISolone sodium succinate (SOLU-Medrol) injection 40 mg (40 mg Intravenous Not Given  11/1/24 1745)   diphenhydrAMINE (BENADRYL) injection 50 mg (50 mg Intravenous Given 11/1/24 1744)   methylPREDNISolone sodium succinate (SOLU-Medrol) 125 MG injection  - ADS Override Pull (125 mg  Given 11/1/24 1745)   famotidine (PEPCID) 10 MG/ML injection  - ADS Override Pull (20 mg  Given 11/1/24 1744)   iopamidol (ISOVUE-370) 76 % injection 150 mL (150 mL Intravenous Given by Other 11/1/24 1734)       Imaging results:  XR Chest 1 View    Result Date: 11/1/2024  No focal pulmonary consolidation. Pulmonary vascular congestion.  This report was finalized on 11/1/2024 6:58 PM by Dr. Rich Bansal M.D on Workstation: [id number]      CT Angiogram Head w AI Analysis of LVO    Result Date: 11/1/2024   No convincing evidence to suggest an acute infarct on the noncontrast head CT or the CT perfusion study. No significant perfusion asymmetries are evident on the Tmax greater than 6 seconds or CBF less than 30% maps.  Approximate 55% stenosis within the proximal portion of the left ICA and 45% to 50% stenosis within the proximal portion of the right ICA by NASCET criteria.  Moderate stenoses within the origins of both vertebral arteries.  Moderate stenosis of the left P3 segment.  The findings of the noncontrast head CT were discussed with Dr. Hercules on 11/01/2024 at approximately 5:26 p.m. The findings of the CT angiogram and CT perfusion study were discussed with Dr. Hercules at approximately 5:40 p.m.  Please take note that this is a preliminary result until the receipt of the reconstructed images from [facility] at which time this report will be finalized.   Radiation dose reduction techniques were utilized, including automated exposure control and exposure modulation based on body size.       CT Angiogram Neck    Result Date: 11/1/2024   No convincing evidence to suggest an acute infarct on the noncontrast head CT or the CT perfusion study. No significant perfusion asymmetries are evident on the Tmax greater than 6  seconds or CBF less than 30% maps.  Approximate 55% stenosis within the proximal portion of the left ICA and 45% to 50% stenosis within the proximal portion of the right ICA by NASCET criteria.  Moderate stenoses within the origins of both vertebral arteries.  Moderate stenosis of the left P3 segment.  The findings of the noncontrast head CT were discussed with Dr. Hercules on 11/01/2024 at approximately 5:26 p.m. The findings of the CT angiogram and CT perfusion study were discussed with Dr. Hercules at approximately 5:40 p.m.  Please take note that this is a preliminary result until the receipt of the reconstructed images from [facility] at which time this report will be finalized.   Radiation dose reduction techniques were utilized, including automated exposure control and exposure modulation based on body size.       CT CEREBRAL PERFUSION WITH & WITHOUT CONTRAST    Result Date: 11/1/2024   No convincing evidence to suggest an acute infarct on the noncontrast head CT or the CT perfusion study. No significant perfusion asymmetries are evident on the Tmax greater than 6 seconds or CBF less than 30% maps.  Approximate 55% stenosis within the proximal portion of the left ICA and 45% to 50% stenosis within the proximal portion of the right ICA by NASCET criteria.  Moderate stenoses within the origins of both vertebral arteries.  Moderate stenosis of the left P3 segment.  The findings of the noncontrast head CT were discussed with Dr. Hercules on 11/01/2024 at approximately 5:26 p.m. The findings of the CT angiogram and CT perfusion study were discussed with Dr. Hercules at approximately 5:40 p.m.  Please take note that this is a preliminary result until the receipt of the reconstructed images from [facility] at which time this report will be finalized.   Radiation dose reduction techniques were utilized, including automated exposure control and exposure modulation based on body size.        Ambulatory status:   -  standby    Social issues:   Social History     Socioeconomic History    Marital status:    Tobacco Use    Smoking status: Former     Current packs/day: 0.00     Types: Cigarettes     Quit date:      Years since quittin.8    Smokeless tobacco: Never    Tobacco comments:     Smoked between the ages of 25 and 40.   Vaping Use    Vaping status: Never Used   Substance and Sexual Activity    Alcohol use: No    Drug use: No    Sexual activity: Defer       Peripheral Neurovascular  Peripheral Neurovascular (Adult)  Peripheral Neurovascular WDL: .WDL except, neurovascular assessment lower  LLE Neurovascular Assessment  Sensation LLE: tingling present    Neuro Cognitive  Neuro Cognitive (Adult)  Cognitive/Neuro/Behavioral WDL: .WDL except  Additional Documentation: NIH Stroke Scale (group)  NIH Stroke Scale  Interval: baseline  1a. Level of Consciousness: 0-->Alert, keenly responsive  1b. LOC Questions: 0-->Answers both questions correctly  1c. LOC Commands: 0-->Performs both tasks correctly  2. Best Gaze: 0-->Normal  3. Visual: 0-->No visual loss  4. Facial Palsy: 0-->Normal symmetrical movements  5a. Motor Arm, Left: 0-->No drift, limb holds 90 (or 45) degrees for full 10 secs  5b. Motor Arm, Right: 0-->No drift, limb holds 90 (or 45) degrees for full 10 secs  6a. Motor Leg, Left: 2-->Some effort against gravity, leg falls to bed by 5 secs, but has some effort against gravity  6b. Motor Leg, Right: 0-->No drift, leg holds 30 degree position for full 5 secs  7. Limb Ataxia: 0-->Absent  8. Sensory: 1-->Mild-to-moderate sensory loss, patient feels pinprick is less sharp or is dull on the affected side, or there is a loss of superficial pain with pinprick, but patient is aware of being touched  9. Best Language: 0-->No aphasia, normal  10. Dysarthria: 0-->Normal  11. Extinction and Inattention (formerly Neglect): 0-->No abnormality  Total (NIH Stroke Scale): 3    Learning  Learning Assessment  Learning Readiness  and Ability: no barriers identified    Respiratory  Respiratory  Airway WDL: WDL  Respiratory WDL  Respiratory WDL: WDL    Abdominal Pain       Pain Assessments  Pain (Adult)  (0-10) Pain Rating: Rest: 0    NIH Stroke Scale  NIH Stroke Scale  Interval: baseline  1a. Level of Consciousness: 0-->Alert, keenly responsive  1b. LOC Questions: 0-->Answers both questions correctly  1c. LOC Commands: 0-->Performs both tasks correctly  2. Best Gaze: 0-->Normal  3. Visual: 0-->No visual loss  4. Facial Palsy: 0-->Normal symmetrical movements  5a. Motor Arm, Left: 0-->No drift, limb holds 90 (or 45) degrees for full 10 secs  5b. Motor Arm, Right: 0-->No drift, limb holds 90 (or 45) degrees for full 10 secs  6a. Motor Leg, Left: 2-->Some effort against gravity, leg falls to bed by 5 secs, but has some effort against gravity  6b. Motor Leg, Right: 0-->No drift, leg holds 30 degree position for full 5 secs  7. Limb Ataxia: 0-->Absent  8. Sensory: 1-->Mild-to-moderate sensory loss, patient feels pinprick is less sharp or is dull on the affected side, or there is a loss of superficial pain with pinprick, but patient is aware of being touched  9. Best Language: 0-->No aphasia, normal  10. Dysarthria: 0-->Normal  11. Extinction and Inattention (formerly Neglect): 0-->No abnormality  Total (NIH Stroke Scale): 3    Fredy Crenshaw RN  11/01/24 19:22 EDT

## 2024-11-01 NOTE — ED NOTES
LKN was 1430. Hypertensive 200s / 100s for EMS. Takes clopidogrel. Hx of previous stroke. Reports numbness left side & dizziness

## 2024-11-01 NOTE — ED PROVIDER NOTES
EMERGENCY DEPARTMENT ENCOUNTER  Room Number:  30/30  Date of encounter:  11/1/2024  PCP: Ning Robledo MD  Patient Care Team:  Ning Robledo MD as PCP - General (Pediatrics)  Wendy Braxton MD as Consulting Physician (Cardiology)  Jensen Mas MD as Consulting Physician (Pulmonary Disease)     HPI:  Context: Bird Gallegos is a 79 y.o. male who presents to the ED c/o chief complaint of strokelike symptoms.  Patient reports sudden onset of strokelike symptoms at 2:30 PM.  Patient reports that he went to stand up, felt dizzy, difficulty characterizing, denies vertigo, reports that he had to hold onto something.  Patient having difficulty asking about disequilibrium or difficulty with coordination, denied any double vision but reports he was having blurry vision, no ringing is ears or hearing loss.  Patient reports numbness in the entire left side of his body, reports weakness in bilateral legs, reports he was unable ambulate.  EMS reports blood pressure extremely elevated, no interventions during transport.  Patient reports he believes he is on a blood thinner.    MEDICAL HISTORY REVIEW  Reviewed in EPIC    PAST MEDICAL HISTORY  Active Ambulatory Problems     Diagnosis Date Noted   • STEF on auto CPAP 12/10/2017   • Class 3 severe obesity due to excess calories with serious comorbidity and body mass index (BMI) of 40.0 to 44.9 in adult 12/10/2017   • Nonrheumatic aortic valve stenosis 03/18/2019   • Aortic valve stenosis 03/22/2019   • Coronary artery disease involving native coronary artery of native heart 03/22/2019   • Paresthesias 11/14/2019   • Diabetic neuropathy 11/14/2019   • Type 2 diabetes mellitus, without long-term current use of insulin 11/14/2019   • Hypertension 11/14/2019   • Thalamic stroke 11/16/2019     Resolved Ambulatory Problems     Diagnosis Date Noted   • Hypersomnia due to another medical condition 12/10/2017   • Morbid obesity with BMI of 40.0-44.9, adult 11/14/2019     Past Medical  History:   Diagnosis Date   • Anxiety and depression    • Aortic stenosis    • Arthritis    • Coronary artery disease    • CTS (carpal tunnel syndrome)    • Degenerative joint disease (DJD) of lumbar spine    • Diabetes mellitus    • Difficulty walking    • Diverticulosis    • COLVIN (dyspnea on exertion)    • GERD (gastroesophageal reflux disease)    • Headache, tension-type    • Heart murmur    • History of cellulitis    • HL (hearing loss)    • STEF (obstructive sleep apnea)    • Stroke    • Vision loss        PAST SURGICAL HISTORY  Past Surgical History:   Procedure Laterality Date   • BACK SURGERY      X2   • CARDIAC CATHETERIZATION N/A 3/21/2019    Procedure: Coronary angiography;  Surgeon: Wendy Braxton MD;  Location: [facility] CATH INVASIVE LOCATION;  Service: Cardiology   • CORONARY ARTERY BYPASS GRAFT WITH AORTIC VALVE REPAIR/REPLACEMENT N/A 3/26/2019    Procedure: INTRAOP QUIQUE; CORONARY ARTERY BYPASS X 1 UTILIZING ENDOSCOPICALLY HARVESTED RIGHT GREATER SAPHENOUS VEIN; AORTIC VALVE REPLACEMENT AND PRP.;  Surgeon: Alexx Gandara MD;  Location: [facility] OR;  Service: Cardiothoracic   • POSTERIOR CERVICAL FUSION     • TENDON REPAIR Left     KNEE   • WRIST SURGERY Bilateral        FAMILY HISTORY  Family History   Problem Relation Age of Onset   • Heart disease Mother    • Arthritis Mother    • Cancer Father    • Hypertension Brother    • Diabetes Brother    • Hypertension Brother    • Parkinsonism Sister    • Arthritis Sister    • Heart disease Maternal Grandmother    • Heart disease Maternal Grandfather    • Heart disease Paternal Grandmother    • Malig Hyperthermia Neg Hx        SOCIAL HISTORY  Social History     Socioeconomic History   • Marital status:    Tobacco Use   • Smoking status: Former     Current packs/day: 0.00     Types: Cigarettes     Quit date:      Years since quittin.8   • Smokeless tobacco: Never   • Tobacco comments:     Smoked between the ages of 25 and 40.   Vaping Use   •  Vaping status: Never Used   Substance and Sexual Activity   • Alcohol use: No   • Drug use: No   • Sexual activity: Defer       ALLERGIES  Contrast dye (echo or unknown ct/mr), Iodine, and Latex    The patient's allergies have been reviewed    REVIEW OF SYSTEMS  All systems reviewed and negative except for those discussed in HPI.     PHYSICAL EXAM  I have reviewed the triage vital signs and nursing notes.  ED Triage Vitals [11/01/24 1709]   Temp Heart Rate Resp BP SpO2   -- 59 20 (!) 194/81 94 %      Temp src Heart Rate Source Patient Position BP Location FiO2 (%)   -- -- -- -- --       General: No acute distress.  HENT: NCAT, PERRL, Nares patent.  Eyes: no scleral icterus.  Neck: trachea midline, no ROM limitations.  CV: regular rhythm, regular rate.  Respiratory: normal effort, CTAB.  Abdomen: soft, nondistended, NTTP, no rebound tenderness, no guarding or rigidity.  Musculoskeletal: no deformity.  Neuro: alert, moves all extremities, follows commands.  Skin: warm, dry.    NIHSS:  0-->Alert: keenly responsive  0-->Answers both questions correctly  0-->Performs both tasks correctly  0=normal  0=No visual loss  0=Normal symmetric movement  0-->No drift: limb holds 90 (or 45) degrees for full 10 secs  0-->No drift: limb holds 90 (or 45) degrees for full 10 secs  2-->Some effort against gravity: limb cannot get to or maintain (if cued) 90 (or 45) degrees, drifts down to bed, but has some effort against gravity  0-->No drift: limb holds 90 (or 45) degrees for full 10 secs  0=Absent  1=Mild to moderate sensory loss; patient feels pinprick is less sharp or is dull on the affected side; there is a loss of superficial pain with pinprick but patient is aware He is being touched  0=No aphasia, normal  0=Normal  0=No abnormality  Total score: 3      LAB RESULTS  Recent Results (from the past 24 hours)   Green Top (Gel)    Collection Time: 11/01/24  5:08 PM   Result Value Ref Range    Extra Tube Hold for add-ons.    Lavender  Top    Collection Time: 11/01/24  5:08 PM   Result Value Ref Range    Extra Tube hold for add-on    Light Blue Top    Collection Time: 11/01/24  5:08 PM   Result Value Ref Range    Extra Tube Hold for add-ons.    Comprehensive Metabolic Panel    Collection Time: 11/01/24  5:08 PM    Specimen: Blood   Result Value Ref Range    Glucose 111 (H) 65 - 99 mg/dL    BUN 15 8 - 23 mg/dL    Creatinine 1.09 0.76 - 1.27 mg/dL    Sodium 143 136 - 145 mmol/L    Potassium 4.1 3.5 - 5.2 mmol/L    Chloride 102 98 - 107 mmol/L    CO2 29.0 22.0 - 29.0 mmol/L    Calcium 10.0 8.6 - 10.5 mg/dL    Total Protein 7.4 6.0 - 8.5 g/dL    Albumin 4.3 3.5 - 5.2 g/dL    ALT (SGPT) 18 1 - 41 U/L    AST (SGOT) 23 1 - 40 U/L    Alkaline Phosphatase 72 39 - 117 U/L    Total Bilirubin 0.4 0.0 - 1.2 mg/dL    Globulin 3.1 gm/dL    A/G Ratio 1.4 g/dL    BUN/Creatinine Ratio 13.8 7.0 - 25.0    Anion Gap 12.0 5.0 - 15.0 mmol/L    eGFR 68.2 >60.0 mL/min/1.73   Protime-INR    Collection Time: 11/01/24  5:08 PM    Specimen: Blood   Result Value Ref Range    Protime 13.4 11.7 - 14.2 Seconds    INR 1.00 0.90 - 1.10   aPTT    Collection Time: 11/01/24  5:08 PM    Specimen: Blood   Result Value Ref Range    PTT 32.4 22.7 - 35.4 seconds   Single High Sensitivity Troponin T    Collection Time: 11/01/24  5:08 PM    Specimen: Blood   Result Value Ref Range    HS Troponin T 21 <22 ng/L   CBC Auto Differential    Collection Time: 11/01/24  5:08 PM    Specimen: Blood   Result Value Ref Range    WBC 7.71 3.40 - 10.80 10*3/mm3    RBC 4.96 4.14 - 5.80 10*6/mm3    Hemoglobin 14.2 13.0 - 17.7 g/dL    Hematocrit 42.7 37.5 - 51.0 %    MCV 86.1 79.0 - 97.0 fL    MCH 28.6 26.6 - 33.0 pg    MCHC 33.3 31.5 - 35.7 g/dL    RDW 14.1 12.3 - 15.4 %    RDW-SD 43.0 37.0 - 54.0 fl    MPV 11.4 6.0 - 12.0 fL    Platelets 230 140 - 450 10*3/mm3    Neutrophil % 65.6 42.7 - 76.0 %    Lymphocyte % 21.0 19.6 - 45.3 %    Monocyte % 8.6 5.0 - 12.0 %    Eosinophil % 4.2 0.3 - 6.2 %    Basophil %  0.5 0.0 - 1.5 %    Immature Grans % 0.1 0.0 - 0.5 %    Neutrophils, Absolute 5.06 1.70 - 7.00 10*3/mm3    Lymphocytes, Absolute 1.62 0.70 - 3.10 10*3/mm3    Monocytes, Absolute 0.66 0.10 - 0.90 10*3/mm3    Eosinophils, Absolute 0.32 0.00 - 0.40 10*3/mm3    Basophils, Absolute 0.04 0.00 - 0.20 10*3/mm3    Immature Grans, Absolute 0.01 0.00 - 0.05 10*3/mm3    nRBC 0.0 0.0 - 0.2 /100 WBC   Type & Screen    Collection Time: 11/01/24  5:46 PM    Specimen: Blood   Result Value Ref Range    ABO Type O     RH type Negative     Antibody Screen Negative     T&S Expiration Date 11/4/2024 11:59:59 PM        I ordered the above labs and reviewed the results.    RADIOLOGY  XR Chest 1 View    Result Date: 11/1/2024  XR CHEST 1 VW-  HISTORY: Male who is 79 years-old, stroke protocol  TECHNIQUE: Frontal view of the chest  COMPARISON: 3/29/2019  FINDINGS: The heart size is normal. Pulmonary vasculature is congested. Sternotomy wires are present. No focal pulmonary consolidation, pleural effusion, or pneumothorax. No acute osseous process.      No focal pulmonary consolidation. Pulmonary vascular congestion.  This report was finalized on 11/1/2024 6:58 PM by Dr. Rich Bansal M.D on Workstation: [id number]      CT Angiogram Head w AI Analysis of LVO, CT Angiogram Neck, CT CEREBRAL PERFUSION WITH & WITHOUT CONTRAST    Result Date: 11/1/2024  CT ANGIOGRAM OF THE HEAD AND NECK AND CT PERFUSION STUDY  CLINICAL HISTORY:  Stroke, follow up  TECHNIQUE:  A noncontrast head CT was performed with 3 mm axial images. Thereafter, a CT perfusion study was performed after the dynamic bolus of IV contrast. Standard perfusion maps were constructed with RAPID software. A CT angiogram of the head and neck was then performed with 1 mm axial images. Sagittal, coronal, and 3-dimensional reconstructed images were obtained. Finally, a delayed postcontrast head CT was performed with 3 mm axial images.  FINDINGS:  NONCONTRAST HEAD CT: Mild changes of chronic  small vessel ischemic phenomena. Otherwise, no convincing evidence for an area of acute infarction.  Incidental note of mild inflammatory paranasal sinus disease with mucosal thickening involving the ethmoid air cells and the sphenoid sinus.  CT PERFUSION STUDY: No asymmetries evident on the Tmax greater than 6 seconds or the CBF less than 30% maps. Scattered areas of apparent hypoperfusion noted within both cerebral and cerebellar hemispheres on the Tmax greater than 4-second maps which are felt to most likely be artifactual in nature. However, further evaluation could be performed with MR imaging.  CTA NECK:   AORTIC ARCH: Classic configuration. Mild atherosclerotic changes.  SUBCLAVIAN ARTERIES: Mild stenosis of the left subclavian artery proximal to the origin of the left vertebral artery.  VERTEBRAL ARTERIES: Moderate stenoses at the origins of the vertebral arteries.  CCA/ICAs: Mild stenosis at the origin of the left common carotid artery. Approximate 55% NASCET stenosis within the proximal portion of the left ICA. Approximate 45% to 50% NASCET stenosis within the proximal portion of the right ICA.  CTA HEAD:  ANTERIOR CIRCULATION: Mild degrees of stenoses within the carotid siphons. Unremarkable appearance of the middle and anterior cerebral arteries.  POSTERIOR CIRCULATION: Moderate stenosis within the V4 segment of the left vertebral artery. Moderate stenosis of the left P3 segment.  DELAYED POSTCONTRAST HEAD CT: No abnormal foci of contrast enhancement are noted within the brain parenchyma.       No convincing evidence to suggest an acute infarct on the noncontrast head CT or the CT perfusion study. No significant perfusion asymmetries are evident on the Tmax greater than 6 seconds or CBF less than 30% maps.  Approximate 55% stenosis within the proximal portion of the left ICA and 45% to 50% stenosis within the proximal portion of the right ICA by NASCET criteria.  Moderate stenoses within the origins of  both vertebral arteries.  Moderate stenosis of the left P3 segment.  The findings of the noncontrast head CT were discussed with Dr. Hercules on 11/01/2024 at approximately 5:26 p.m. The findings of the CT angiogram and CT perfusion study were discussed with Dr. Hercules at approximately 5:40 p.m.  Please take note that this is a preliminary result until the receipt of the reconstructed images from [facility] at which time this report will be finalized.   Radiation dose reduction techniques were utilized, including automated exposure control and exposure modulation based on body size.        I ordered the above noted radiological studies. I reviewed the images and results. I agree with the radiologist interpretation.    PROCEDURES  Procedures    MEDICATIONS GIVEN IN ER  Medications   sodium chloride 0.9 % flush 10 mL (has no administration in time range)   methylPREDNISolone sodium succinate (SOLU-Medrol) injection 40 mg (40 mg Intravenous Not Given 11/1/24 1745)   diphenhydrAMINE (BENADRYL) injection 50 mg (50 mg Intravenous Given 11/1/24 1744)   methylPREDNISolone sodium succinate (SOLU-Medrol) 125 MG injection  - ADS Override Pull (125 mg  Given 11/1/24 1745)   famotidine (PEPCID) 10 MG/ML injection  - ADS Override Pull (20 mg  Given 11/1/24 1744)   iopamidol (ISOVUE-370) 76 % injection 150 mL (150 mL Intravenous Given by Other 11/1/24 1734)       PROGRESS, DATA ANALYSIS, CONSULTS, AND MEDICAL DECISION MAKING  A complete history and physical exam have been performed.  All available laboratory and imaging results have been reviewed by myself prior to disposition.    MDM    After the initial H&P, I discussed pertinent information from history and physical exam with patient/family.  Discussed differential diagnosis.  Discussed plan for ED evaluation/workup/treatment.  All questions answered.  Patient/family is agreeable with plan.  ED Course as of 11/01/24 1295   Fri Nov 01, 2024   8954 Activating team D [JCELSO]   6396  Phone call with Dr. Cano, stroke neurology.  Discussed the patient, relevant history, exam, diagnostics, ED findings/progress, and concerns.  He recommends proceeding with team deactivation, reports patient has potential TNK candidate.  They agree to consult.    [JG]   1717 Patient has contrast allergy, ordering pretreatment. [JG]   1718 Discussed patient with ED pharmacist, discussed patient ED presentation workup and results, discussed allergy with plans for pretreatment.  Discussed with release charge, he is facilitating medications being taken to CT imaging. [JG]   1741 I reviewed patient's MRI brain imaging from 11/15/2019, no evidence of acute intracranial pathology, mild changes of chronic small vessel ischemic phenomena are noted.  I reviewed patient's neurology office visit note from January 2020, patient had previously been admitted to [facility] in November 2019 with left-sided numbness as well as elevated blood pressures and dizziness.  Per neurology note patient was continuing to have left-sided numbness and has diabetic neuropathy. [JG]   1743 Updating neurologist on record review. [JG]   1744 I reviewed CT head imaging in PACS, no intracranial hemorrhage per my read. [JG]   1749 Phone call with stroke neurologist, discussed patient presentation ED workup and results to present.  He has reviewed imaging.  I informed him of record review performed with patient having similar symptoms in the past.  After discussion risk and benefits he request me to offer TNK but requested shared decision making as symptoms are less severe and patient is high risk for bleed given age and risk factors. [JG]   1750 I had extensive discussion with patient regarding risks and benefits of TNK administration.  After discussion of risks and benefits, patient elects not to proceed with TNK administration.  Updated ED pharmacist. [JG]   1750 Blood pressure elevated, plans for permissive hypertension until stroke is been  fully ruled out with MRI imaging. [JG]   1806 EKG independently viewed and contemporaneously interpreted by ED physician. Time: 1801.  Rate 69.  Interpretation: Normal sinus rhythm, borderline left axis deviation, first-degree AV block, left atrial enlargement, right bundle branch block, no acute ST changes. [JG]   1919 Phone call with Trung, NELA with A.  Discussed the patient, relevant history, exam, diagnostics, ED findings/progress, and concerns. They agree to admit the patient to telemetry observation under Dr. Castañeda. Care assumed by the admitting physician at this time.     [JG]   2002 Blood pressure passing permissive hypertension, current blood pressure 223/130, treating with labetalol. [JG]      ED Course User Index  [JG] Vega Stevens MD       AS OF 19:20 EDT VITALS:    BP - (!) 212/97  HR - 68  TEMP - 98.2 °F (36.8 °C) (Oral)  O2 SATS - 98%    DIAGNOSIS  Final diagnoses:   Stroke-like symptoms   Hypertension, unspecified type   Hyperglycemia   History of CAD (coronary artery disease)     Critical care:  Total critical care time of 48 minutes is exclusive of any other billable procedures and includes time spent with direct patient care and observation, retrospective chart review, management of acute condition, and consultation with other physicians.      DISPOSITION  ADMISSION    Discussed treatment plan and reason for admission with pt/family and admitting physician.  Pt/family voiced understanding of the plan for admission for further testing/treatment as needed.        Vega Stevens MD  11/01/24 1920       Vega Stevens MD  11/01/24 4658

## 2024-11-01 NOTE — PROGRESS NOTES
Clinical Pharmacy Services: Medication History    Bird Gallegos is a 79 y.o. male presenting to [facility] for   Chief Complaint   Patient presents with    Dizziness       He  has a past medical history of Anxiety and depression, Aortic stenosis, Arthritis, Coronary artery disease, CTS (carpal tunnel syndrome), Degenerative joint disease (DJD) of lumbar spine, Diabetes mellitus, Diabetic neuropathy, Difficulty walking, Diverticulosis, COLVIN (dyspnea on exertion), GERD (gastroesophageal reflux disease), Headache, tension-type, Heart murmur, History of cellulitis, HL (hearing loss), Hypertension, STEF (obstructive sleep apnea), STEF on auto CPAP, Stroke, and Vision loss.    Allergies as of 11/01/2024 - Reviewed 11/01/2024   Allergen Reaction Noted    Contrast dye (echo or unknown ct/mr) Hives 03/22/2019    Iodine Unknown - Low Severity 05/12/2022    Latex Hives 05/28/2021       Medication information was obtained from: Patient & Surescript   Pharmacy and Phone Number:     Prior to Admission Medications       Prescriptions Last Dose Informant Patient Reported? Taking?    albuterol sulfate  (90 Base) MCG/ACT inhaler  Self Yes Yes    Inhale 2 puffs Every 4 (Four) Hours As Needed for Wheezing or Shortness of Air.    ascorbic acid (VITAMIN C) 1000 MG tablet  Self Yes Yes    Take 1 tablet by mouth Daily.    atorvastatin (LIPITOR) 40 MG tablet  Self Yes Yes    Take 1 tablet by mouth Daily.    carvedilol (COREG) 25 MG tablet  Self No Yes    Take 1 tablet by mouth Every 12 (Twelve) Hours.    clonazePAM (KlonoPIN) 1 MG tablet  Self Yes Yes    Take 2 tablets by mouth Every Night.    clopidogrel (PLAVIX) 75 MG tablet  Self Yes Yes    Take 1 tablet by mouth Daily.    fenofibrate (TRICOR) 145 MG tablet  Self No Yes    TAKE 1 TABLET EVERY DAY    Patient taking differently:  Take 1 tablet by mouth Daily.    furosemide (LASIX) 40 MG tablet  Self No Yes    Take 2 tablets by mouth Daily.    Patient taking differently:   Take 1 tablet by mouth Daily.    gabapentin (NEURONTIN) 300 MG capsule  Self Yes Yes    Take 1 capsule by mouth 3 (Three) Times a Day.    glimepiride (AMARYL) 1 MG tablet  Self Yes Yes    Take 0.5 tablets by mouth Every Morning Before Breakfast.    lisinopril (PRINIVIL,ZESTRIL) 20 MG tablet  Self Yes Yes    Take 1 tablet by mouth Daily.    loratadine (CLARITIN) 10 MG tablet  Self Yes Yes    Take 1 tablet by mouth Daily.    metFORMIN (GLUCOPHAGE) 1000 MG tablet  Self Yes Yes    Take 1 tablet by mouth 2 (Two) Times a Day With Meals.    Multiple Vitamins-Minerals (ICAPS AREDS 2 PO)  Self Yes Yes    Take 1 tablet by mouth 2 (Two) Times a Day.    olopatadine (PATANOL) 0.1 % ophthalmic solution  Self Yes Yes    Apply 1 drop to eye(s) as directed by provider 2 (Two) Times a Day.    sertraline (ZOLOFT) 50 MG tablet  Self Yes Yes    Take 1 tablet by mouth Daily.    tamsulosin (FLOMAX) 0.4 MG capsule 24 hr capsule  Self Yes Yes    Take 1 capsule by mouth Every Night.    vitamin B-12 (VITAMIN B-12) 1000 MCG tablet  Self No Yes    Take 1 tablet by mouth Daily.    isosorbide mononitrate (IMDUR) 30 MG 24 hr tablet   No No    Take 1 tablet by mouth Daily.    potassium chloride (K-DUR,KLOR-CON) 10 MEQ CR tablet Unknown Self Yes No    Take 1 tablet by mouth Daily.              Medication notes: Went over medications with pt, pt seems like he wasn't for absolute sure about his medications. I compared the meds with surescript and what pt stated he is taking.     This medication list is complete to the best of my knowledge as of 11/1/2024    Please call if questions.    Sheila Strauss  Medication History Technician   [phone]    11/1/2024 19:21 EDT

## 2024-11-02 ENCOUNTER — APPOINTMENT (OUTPATIENT)
Dept: MRI IMAGING | Facility: HOSPITAL | Age: 79
End: 2024-11-02
Payer: MEDICARE

## 2024-11-02 ENCOUNTER — APPOINTMENT (OUTPATIENT)
Dept: CARDIOLOGY | Facility: HOSPITAL | Age: 79
End: 2024-11-02
Payer: MEDICARE

## 2024-11-02 LAB
ALBUMIN SERPL-MCNC: 3.9 G/DL (ref 3.5–5.2)
ALBUMIN/GLOB SERPL: 1.3 G/DL
ALP SERPL-CCNC: 64 U/L (ref 39–117)
ALT SERPL W P-5'-P-CCNC: 16 U/L (ref 1–41)
ANION GAP SERPL CALCULATED.3IONS-SCNC: 17.6 MMOL/L (ref 5–15)
AORTIC DIMENSIONLESS INDEX: 0.4 (DI)
ASCENDING AORTA: 3.4 CM
AST SERPL-CCNC: 17 U/L (ref 1–40)
BH CV ECHO MEAS - AO MAX PG: 31.4 MMHG
BH CV ECHO MEAS - AO MEAN PG: 17 MMHG
BH CV ECHO MEAS - AO V2 MAX: 280 CM/SEC
BH CV ECHO MEAS - AO V2 VTI: 54 CM
BH CV ECHO MEAS - AVA(I,D): 1.02 CM2
BH CV ECHO MEAS - EDV(CUBED): 79.5 ML
BH CV ECHO MEAS - EDV(MOD-SP2): 88 ML
BH CV ECHO MEAS - EDV(MOD-SP4): 113 ML
BH CV ECHO MEAS - EF(MOD-BP): 67 %
BH CV ECHO MEAS - EF(MOD-SP2): 67 %
BH CV ECHO MEAS - EF(MOD-SP4): 71.7 %
BH CV ECHO MEAS - ESV(CUBED): 15.3 ML
BH CV ECHO MEAS - ESV(MOD-SP2): 29 ML
BH CV ECHO MEAS - ESV(MOD-SP4): 32 ML
BH CV ECHO MEAS - FS: 42.2 %
BH CV ECHO MEAS - IVS/LVPW: 1 CM
BH CV ECHO MEAS - IVSD: 1.1 CM
BH CV ECHO MEAS - LA A2CS (ATRIAL LENGTH): 7.1 CM
BH CV ECHO MEAS - LA A4C LENGTH: 7.4 CM
BH CV ECHO MEAS - LAT PEAK E' VEL: 7.9 CM/SEC
BH CV ECHO MEAS - LV DIASTOLIC VOL/BSA (35-75): 48.8 CM2
BH CV ECHO MEAS - LV MASS(C)D: 162.9 GRAMS
BH CV ECHO MEAS - LV MAX PG: 4.2 MMHG
BH CV ECHO MEAS - LV MEAN PG: 2 MMHG
BH CV ECHO MEAS - LV SYSTOLIC VOL/BSA (12-30): 13.8 CM2
BH CV ECHO MEAS - LV V1 MAX: 102 CM/SEC
BH CV ECHO MEAS - LV V1 VTI: 22.6 CM
BH CV ECHO MEAS - LVIDD: 4.3 CM
BH CV ECHO MEAS - LVIDS: 2.48 CM
BH CV ECHO MEAS - LVOT AREA: 2.44 CM2
BH CV ECHO MEAS - LVOT DIAM: 1.76 CM
BH CV ECHO MEAS - LVPWD: 1.1 CM
BH CV ECHO MEAS - MV A DUR: 0.18 SEC
BH CV ECHO MEAS - MV A MAX VEL: 87 CM/SEC
BH CV ECHO MEAS - MV DEC SLOPE: 618.4 CM/SEC2
BH CV ECHO MEAS - MV DEC TIME: 0.21 SEC
BH CV ECHO MEAS - MV E MAX VEL: 63.1 CM/SEC
BH CV ECHO MEAS - MV E/A: 0.73
BH CV ECHO MEAS - MV MAX PG: 5.5 MMHG
BH CV ECHO MEAS - MV MEAN PG: 2.4 MMHG
BH CV ECHO MEAS - MV P1/2T: 48.2 MSEC
BH CV ECHO MEAS - MV V2 VTI: 26 CM
BH CV ECHO MEAS - MVA(P1/2T): 4.6 CM2
BH CV ECHO MEAS - MVA(VTI): 2.13 CM2
BH CV ECHO MEAS - PA ACC TIME: 0.09 SEC
BH CV ECHO MEAS - PA V2 MAX: 146.6 CM/SEC
BH CV ECHO MEAS - QP/QS: 1.93
BH CV ECHO MEAS - RAP SYSTOLE: 3 MMHG
BH CV ECHO MEAS - RV MAX PG: 5.2 MMHG
BH CV ECHO MEAS - RV V1 MAX: 113.5 CM/SEC
BH CV ECHO MEAS - RV V1 VTI: 27 CM
BH CV ECHO MEAS - RVOT DIAM: 2.24 CM
BH CV ECHO MEAS - RVSP: 29 MMHG
BH CV ECHO MEAS - SV(LVOT): 55.2 ML
BH CV ECHO MEAS - SV(MOD-SP2): 59 ML
BH CV ECHO MEAS - SV(MOD-SP4): 81 ML
BH CV ECHO MEAS - SV(RVOT): 106.5 ML
BH CV ECHO MEAS - SVI(LVOT): 23.9 ML/M2
BH CV ECHO MEAS - SVI(MOD-SP2): 25.5 ML/M2
BH CV ECHO MEAS - SVI(MOD-SP4): 35 ML/M2
BH CV ECHO MEAS - TAPSE (>1.6): 2.03 CM
BH CV ECHO MEAS - TR MAX PG: 25.8 MMHG
BH CV ECHO MEAS - TR MAX VEL: 253.8 CM/SEC
BH CV ECHO SHUNT ASSESSMENT PERFORMED (HIDDEN SCRIPTING): 1
BH CV XLRA - RV BASE: 3.3 CM
BH CV XLRA - RV LENGTH: 8.1 CM
BH CV XLRA - RV MID: 2.8 CM
BH CV XLRA - TDI S': 11.1 CM/SEC
BILIRUB SERPL-MCNC: 0.4 MG/DL (ref 0–1.2)
BUN SERPL-MCNC: 18 MG/DL (ref 8–23)
BUN/CREAT SERPL: 16.5 (ref 7–25)
CALCIUM SPEC-SCNC: 9.5 MG/DL (ref 8.6–10.5)
CHLORIDE SERPL-SCNC: 100 MMOL/L (ref 98–107)
CHOLEST SERPL-MCNC: 157 MG/DL (ref 0–200)
CO2 SERPL-SCNC: 21.4 MMOL/L (ref 22–29)
CREAT SERPL-MCNC: 1.09 MG/DL (ref 0.76–1.27)
DEPRECATED RDW RBC AUTO: 41.7 FL (ref 37–54)
EGFRCR SERPLBLD CKD-EPI 2021: 68.2 ML/MIN/1.73
ERYTHROCYTE [DISTWIDTH] IN BLOOD BY AUTOMATED COUNT: 13.6 % (ref 12.3–15.4)
GLOBULIN UR ELPH-MCNC: 3 GM/DL
GLUCOSE BLDC GLUCOMTR-MCNC: 173 MG/DL (ref 70–130)
GLUCOSE BLDC GLUCOMTR-MCNC: 183 MG/DL (ref 70–130)
GLUCOSE BLDC GLUCOMTR-MCNC: 231 MG/DL (ref 70–130)
GLUCOSE BLDC GLUCOMTR-MCNC: 233 MG/DL (ref 70–130)
GLUCOSE SERPL-MCNC: 267 MG/DL (ref 65–99)
HBA1C MFR BLD: 7 % (ref 4.8–5.6)
HCT VFR BLD AUTO: 43.6 % (ref 37.5–51)
HDLC SERPL-MCNC: 38 MG/DL (ref 40–60)
HGB BLD-MCNC: 14.4 G/DL (ref 13–17.7)
LDLC SERPL CALC-MCNC: 103 MG/DL (ref 0–100)
LDLC/HDLC SERPL: 2.67 {RATIO}
LEFT ATRIUM VOLUME INDEX: 49.8 ML/M2
MCH RBC QN AUTO: 28.1 PG (ref 26.6–33)
MCHC RBC AUTO-ENTMCNC: 33 G/DL (ref 31.5–35.7)
MCV RBC AUTO: 85 FL (ref 79–97)
PLATELET # BLD AUTO: 209 10*3/MM3 (ref 140–450)
PMV BLD AUTO: 11.6 FL (ref 6–12)
POTASSIUM SERPL-SCNC: 3.8 MMOL/L (ref 3.5–5.2)
PROT SERPL-MCNC: 6.9 G/DL (ref 6–8.5)
QT INTERVAL: 441 MS
QTC INTERVAL: 473 MS
RBC # BLD AUTO: 5.13 10*6/MM3 (ref 4.14–5.8)
SINUS: 2.9 CM
SODIUM SERPL-SCNC: 139 MMOL/L (ref 136–145)
STJ: 2.46 CM
TRIGL SERPL-MCNC: 87 MG/DL (ref 0–150)
TSH SERPL DL<=0.05 MIU/L-ACNC: 1.08 UIU/ML (ref 0.27–4.2)
VLDLC SERPL-MCNC: 16 MG/DL (ref 5–40)
WBC NRBC COR # BLD AUTO: 8.23 10*3/MM3 (ref 3.4–10.8)

## 2024-11-02 PROCEDURE — 25010000002 PROCHLORPERAZINE 10 MG/2ML SOLUTION: Performed by: INTERNAL MEDICINE

## 2024-11-02 PROCEDURE — 80053 COMPREHEN METABOLIC PANEL: CPT | Performed by: NURSE PRACTITIONER

## 2024-11-02 PROCEDURE — G0378 HOSPITAL OBSERVATION PER HR: HCPCS

## 2024-11-02 PROCEDURE — 25510000001 PERFLUTREN 6.52 MG/ML SUSPENSION 2 ML VIAL: Performed by: NURSE PRACTITIONER

## 2024-11-02 PROCEDURE — 96375 TX/PRO/DX INJ NEW DRUG ADDON: CPT

## 2024-11-02 PROCEDURE — 85027 COMPLETE CBC AUTOMATED: CPT | Performed by: NURSE PRACTITIONER

## 2024-11-02 PROCEDURE — 84443 ASSAY THYROID STIM HORMONE: CPT | Performed by: NURSE PRACTITIONER

## 2024-11-02 PROCEDURE — 25010000002 DIPHENHYDRAMINE PER 50 MG: Performed by: INTERNAL MEDICINE

## 2024-11-02 PROCEDURE — 93306 TTE W/DOPPLER COMPLETE: CPT | Performed by: INTERNAL MEDICINE

## 2024-11-02 PROCEDURE — 63710000001 INSULIN GLARGINE PER 5 UNITS: Performed by: INTERNAL MEDICINE

## 2024-11-02 PROCEDURE — 97166 OT EVAL MOD COMPLEX 45 MIN: CPT

## 2024-11-02 PROCEDURE — 63710000001 INSULIN LISPRO (HUMAN) PER 5 UNITS: Performed by: STUDENT IN AN ORGANIZED HEALTH CARE EDUCATION/TRAINING PROGRAM

## 2024-11-02 PROCEDURE — 83036 HEMOGLOBIN GLYCOSYLATED A1C: CPT | Performed by: NURSE PRACTITIONER

## 2024-11-02 PROCEDURE — 82948 REAGENT STRIP/BLOOD GLUCOSE: CPT

## 2024-11-02 PROCEDURE — 96376 TX/PRO/DX INJ SAME DRUG ADON: CPT

## 2024-11-02 PROCEDURE — 93306 TTE W/DOPPLER COMPLETE: CPT

## 2024-11-02 PROCEDURE — 25010000002 METHYLPREDNISOLONE PER 40 MG: Performed by: EMERGENCY MEDICINE

## 2024-11-02 PROCEDURE — 99222 1ST HOSP IP/OBS MODERATE 55: CPT | Performed by: PSYCHIATRY & NEUROLOGY

## 2024-11-02 PROCEDURE — 97530 THERAPEUTIC ACTIVITIES: CPT

## 2024-11-02 PROCEDURE — 80061 LIPID PANEL: CPT | Performed by: NURSE PRACTITIONER

## 2024-11-02 PROCEDURE — 70551 MRI BRAIN STEM W/O DYE: CPT

## 2024-11-02 RX ORDER — DIPHENHYDRAMINE HYDROCHLORIDE 50 MG/ML
12.5 INJECTION INTRAMUSCULAR; INTRAVENOUS ONCE
Status: COMPLETED | OUTPATIENT
Start: 2024-11-02 | End: 2024-11-02

## 2024-11-02 RX ORDER — NICOTINE POLACRILEX 4 MG
15 LOZENGE BUCCAL
Status: DISCONTINUED | OUTPATIENT
Start: 2024-11-02 | End: 2024-11-04 | Stop reason: HOSPADM

## 2024-11-02 RX ORDER — IBUPROFEN 600 MG/1
1 TABLET ORAL
Status: DISCONTINUED | OUTPATIENT
Start: 2024-11-02 | End: 2024-11-04 | Stop reason: HOSPADM

## 2024-11-02 RX ORDER — INSULIN LISPRO 100 [IU]/ML
2-7 INJECTION, SOLUTION INTRAVENOUS; SUBCUTANEOUS
Status: DISCONTINUED | OUTPATIENT
Start: 2024-11-02 | End: 2024-11-04 | Stop reason: HOSPADM

## 2024-11-02 RX ORDER — AMLODIPINE BESYLATE 10 MG/1
10 TABLET ORAL
Status: DISCONTINUED | OUTPATIENT
Start: 2024-11-02 | End: 2024-11-04 | Stop reason: HOSPADM

## 2024-11-02 RX ORDER — ATORVASTATIN CALCIUM 20 MG/1
40 TABLET, FILM COATED ORAL NIGHTLY
Status: DISCONTINUED | OUTPATIENT
Start: 2024-11-02 | End: 2024-11-04 | Stop reason: HOSPADM

## 2024-11-02 RX ORDER — FUROSEMIDE 40 MG/1
40 TABLET ORAL DAILY
Status: DISCONTINUED | OUTPATIENT
Start: 2024-11-02 | End: 2024-11-04 | Stop reason: HOSPADM

## 2024-11-02 RX ORDER — PROCHLORPERAZINE EDISYLATE 5 MG/ML
10 INJECTION INTRAMUSCULAR; INTRAVENOUS ONCE
Status: COMPLETED | OUTPATIENT
Start: 2024-11-02 | End: 2024-11-02

## 2024-11-02 RX ORDER — DEXTROSE MONOHYDRATE 25 G/50ML
25 INJECTION, SOLUTION INTRAVENOUS
Status: DISCONTINUED | OUTPATIENT
Start: 2024-11-02 | End: 2024-11-04 | Stop reason: HOSPADM

## 2024-11-02 RX ADMIN — INSULIN LISPRO 3 UNITS: 100 INJECTION, SOLUTION INTRAVENOUS; SUBCUTANEOUS at 08:23

## 2024-11-02 RX ADMIN — ATORVASTATIN CALCIUM 40 MG: 20 TABLET, FILM COATED ORAL at 22:14

## 2024-11-02 RX ADMIN — CLONAZEPAM 2 MG: 0.5 TABLET ORAL at 22:16

## 2024-11-02 RX ADMIN — ASPIRIN 325 MG: 325 TABLET ORAL at 08:24

## 2024-11-02 RX ADMIN — INSULIN LISPRO 3 UNITS: 100 INJECTION, SOLUTION INTRAVENOUS; SUBCUTANEOUS at 17:59

## 2024-11-02 RX ADMIN — INSULIN GLARGINE 7 UNITS: 100 INJECTION, SOLUTION SUBCUTANEOUS at 15:33

## 2024-11-02 RX ADMIN — ACETAMINOPHEN 325MG 650 MG: 325 TABLET ORAL at 00:53

## 2024-11-02 RX ADMIN — CARVEDILOL 25 MG: 25 TABLET, FILM COATED ORAL at 00:55

## 2024-11-02 RX ADMIN — METHYLPREDNISOLONE SODIUM SUCCINATE 40 MG: 40 INJECTION, POWDER, FOR SOLUTION INTRAMUSCULAR; INTRAVENOUS at 00:54

## 2024-11-02 RX ADMIN — PROCHLORPERAZINE EDISYLATE 10 MG: 5 INJECTION INTRAMUSCULAR; INTRAVENOUS at 15:32

## 2024-11-02 RX ADMIN — PERFLUTREN 5 ML: 6.52 INJECTION, SUSPENSION INTRAVENOUS at 11:59

## 2024-11-02 RX ADMIN — DIPHENHYDRAMINE HYDROCHLORIDE 12.5 MG: 50 INJECTION, SOLUTION INTRAMUSCULAR; INTRAVENOUS at 15:32

## 2024-11-02 RX ADMIN — KETOTIFEN FUMARATE 1 DROP: 0.25 SOLUTION OPHTHALMIC at 22:18

## 2024-11-02 RX ADMIN — SERTRALINE 50 MG: 50 TABLET, FILM COATED ORAL at 08:24

## 2024-11-02 RX ADMIN — GABAPENTIN 300 MG: 300 CAPSULE ORAL at 22:15

## 2024-11-02 RX ADMIN — AMLODIPINE BESYLATE 10 MG: 10 TABLET ORAL at 15:33

## 2024-11-02 RX ADMIN — INSULIN LISPRO 2 UNITS: 100 INJECTION, SOLUTION INTRAVENOUS; SUBCUTANEOUS at 22:18

## 2024-11-02 RX ADMIN — METHYLPREDNISOLONE SODIUM SUCCINATE 40 MG: 40 INJECTION, POWDER, FOR SOLUTION INTRAMUSCULAR; INTRAVENOUS at 12:41

## 2024-11-02 RX ADMIN — GABAPENTIN 300 MG: 300 CAPSULE ORAL at 00:55

## 2024-11-02 RX ADMIN — GABAPENTIN 300 MG: 300 CAPSULE ORAL at 15:33

## 2024-11-02 RX ADMIN — INSULIN LISPRO 3 UNITS: 100 INJECTION, SOLUTION INTRAVENOUS; SUBCUTANEOUS at 12:41

## 2024-11-02 RX ADMIN — GABAPENTIN 300 MG: 300 CAPSULE ORAL at 08:24

## 2024-11-02 RX ADMIN — ATORVASTATIN CALCIUM 80 MG: 80 TABLET, FILM COATED ORAL at 00:54

## 2024-11-02 RX ADMIN — FUROSEMIDE 40 MG: 40 TABLET ORAL at 00:55

## 2024-11-02 RX ADMIN — KETOTIFEN FUMARATE 1 DROP: 0.25 SOLUTION OPHTHALMIC at 12:41

## 2024-11-02 RX ADMIN — CLONAZEPAM 2 MG: 0.5 TABLET ORAL at 00:54

## 2024-11-02 RX ADMIN — CARVEDILOL 25 MG: 25 TABLET, FILM COATED ORAL at 12:42

## 2024-11-02 RX ADMIN — TAMSULOSIN HYDROCHLORIDE 0.4 MG: 0.4 CAPSULE ORAL at 22:15

## 2024-11-02 RX ADMIN — CLOPIDOGREL BISULFATE 75 MG: 75 TABLET, FILM COATED ORAL at 08:24

## 2024-11-02 NOTE — SIGNIFICANT NOTE
11/02/24 1156   OTHER   Discipline physical therapist   Rehab Time/Intention   Session Not Performed patient unavailable for evaluation  (Pt off unit for testing. PT may follow up later this afternoon, time permitting, or next service date for eval as appropriate.)   Recommendation   PT - Next Appointment 11/03/24

## 2024-11-02 NOTE — PROGRESS NOTES
Physical Therapy Daily Treatment Note    [facility]  [address] [place] [address]  [phone] (phone)  [phone] (fax)    Patient: Bird Gallegos   : 1945  Diagnosis/ICD-10 Code:  Chronic midline low back pain without sciatica [M54.50, G89.29]  Referring practitioner: Ning Robledo MD  Date of Initial Visit: Type: THERAPY  Noted: 3/29/2023  Today's Date: 2023  Patient seen for 9 sessions             Subjective   I had trouble walking this morning because of weakness in my legs.    Objective     AQUATIC EXERCISE:  Water walking Forwards, Sideways and Backwards -Independent   Rows w/ closed paddles X 20    Paddle Stirs  15/15   Shoulder Abd/Add w/ closed paddles X 15   Step Ups 6 inch X 15 each  Hamstring Stretch   w/ noodle under ankle, 20 sec X 2   Calf Stretch  20 sec X 2   Wall Crawl (BKTC) 20 sec X 3                -  Decompression w/ hips/knees bent x 3 min with LN  Abdominals                 Solid white Pushdowns X 20  Hip Abd/Add                20x ea  SLRs                           20x ea  March in Place            15x, No UE support  Mini Squat                   20x  Toe/Heel Raises         20x  Sit to Stands X 10, No hand support  Uni-Clock                    -  Bicycle                         2 min, seated, on own  Flutter/Scissor             15 / 15, seated, on own       Assessment/Plan  Bird reports intermittent difficulty with walking secondary to leg weakness, he notices this more first thing in the morning.  Plan: Patient due for 30 day POC next appt.          Timed:  Aquatic Therapy    45     mins [id number];    Jair Morin, PT  Physical Therapist    [place] License:  [id number]   No

## 2024-11-02 NOTE — CONSULTS
Neurology Consult Note    Consult Date: 11/2/2024    Referring MD: Dr. Goldberg    Reason for Consult I have been asked to see the patient in neurological consultation to render advice and opinion regarding dizziness, weakness, possible stroke    Bird Gallegos is a 79 y.o. male with history of depression, CAD, obstructive sleep apnea, hypertension.  I saw him in 2019 for left hemiparesis and sensory changes and suspected he had had a thalamic stroke.  He was treated with antiplatelets.  He reports that yesterday he was in his usual state of health and ran several errands earlier in the day without difficulty.  He was sitting at his desk in the afternoon paying bills and had sudden onset of dizziness described as lightheadedness with some associated generalized weakness and difficulty walking.  He describes some left upper extremity numbness rating to the shoulder and the chest with some pain in that limb.  He was brought to the emergency department where he underwent team D imaging with CTA and CT perfusion which showed moderate bilateral carotid stenosis but no acute flow-limiting occlusion.  He was markedly hypertensive in the emergency department.  He was admitted to [facility] [place] and underwent brain MRI which showed no acute findings.  Today he feels a bit better but not quite back to his baseline.    Past Medical History:   Diagnosis Date    Anxiety and depression     Aortic stenosis     Arthritis     Coronary artery disease     CTS (carpal tunnel syndrome)     Degenerative joint disease (DJD) of lumbar spine     Diabetes mellitus     Diabetic neuropathy     Difficulty walking     Diverticulosis     COLVIN (dyspnea on exertion)     GERD (gastroesophageal reflux disease)     Headache, tension-type     Heart murmur     History of cellulitis     LEFT LEG    HL (hearing loss)     Hypertension     STEF (obstructive sleep apnea)     STEF on auto CPAP     Stroke     Vision loss        Exam  /70 (BP Location: Right arm,  "Patient Position: Lying)   Pulse 78   Temp 97.9 °F (36.6 °C) (Oral)   Resp 18   Ht 172.7 cm (68\")   Wt 122 kg (268 lb)   SpO2 94%   BMI 40.75 kg/m²   Gen: NAD, vitals reviewed  MS: oriented x3, recent/remote memory intact, normal attention/concentration, language intact, no neglect.  CN: visual acuity grossly normal, PERRL, EOMI, no facial droop, no dysarthria  Motor: 5/5 bilateral upper extremities, right lower extremity, 4+/5 left lower extremity  Sensory: Hyperesthesia to cold temperature left face, left arm, diminished to cold temperature and vibration distal lower extremities  Reflexes: Brisk left upper extremity    DATA:    Lab Results   Component Value Date    GLUCOSE 267 (H) 11/02/2024    CALCIUM 9.5 11/02/2024     11/02/2024    K 3.8 11/02/2024    CO2 21.4 (L) 11/02/2024     11/02/2024    BUN 18 11/02/2024    CREATININE 1.09 11/02/2024    EGFRIFAFRI >60 02/13/2023    EGFRIFNONA 78 11/15/2019    BCR 16.5 11/02/2024    ANIONGAP 17.6 (H) 11/02/2024     Lab Results   Component Value Date    WBC 8.23 11/02/2024    HGB 14.4 11/02/2024    HCT 43.6 11/02/2024    MCV 85.0 11/02/2024     11/02/2024       Lab review: Glucose 267, CBC normal    Imaging review: I personally reviewed his CTA head and neck and MRI brain form since admission.  CTA significant for moderate bilateral carotid stenosis.  CT perfusion was negative.  CTA neck is also significant for evidence of prior cervical fusion.  MRI brain on my review shows no acute infarct.    Diagnoses:  Hypertensive urgency  History of cervical myelopathy status post surgery, suspected cervical radiculopathy  Obstructive sleep apnea  Generalized anxiety    Pre-stroke MRS: 2  NIHSS: 2    Comment: With regard to his presenting symptoms of lightheadedness and generalized weakness I think this was likely secondary to hypertensive urgency.  His blood pressure has come down and his symptoms have improved.  He does have residual left-sided numbness " mainly in the arm with associated brisk reflexes in the arm which could be secondary to chronic stroke but probably more likely correlates to his cervical myelopathy    PLAN:  Normalize blood pressure  PT/OT assessment  Continue Plavix, Lipitor for secondary stroke prevention    No further neurologic workup planned

## 2024-11-02 NOTE — PLAN OF CARE
Goal Outcome Evaluation:              Outcome Evaluation: Patient passed RN swallow screen, on diet. MRI pending. Will follow up for need for eval. Presented with dizziness.

## 2024-11-02 NOTE — H&P
Patient Name:  Bird Gallegos  YOB: 1945  MRN:  [id number]  Admit Date:  11/1/2024  Patient Care Team:  Ning Robledo MD as PCP - General (Pediatrics)  Wendy Braxton MD as Consulting Physician (Cardiology)  Jensen Mas MD as Consulting Physician (Pulmonary Disease)      Subjective   History Present Illness     Chief Complaint   Patient presents with    Dizziness       Mr. Gallegos is a 79 y.o. former smoker with a history of thalmic stroke, hypertension, coronary artery disease, nonrheumatic aortic valve stenosis, type 2 diabetes mellitus with peripheral neuropathy, STEF on CPAP, and obesity that presents to [facility] complaining of dizziness and left-sided weakness. He reports that around 2:30 PM today, he had a sudden onset of dizziness while he was sitting down paying bills. He states he went to get up to go to the bathroom and he had weakness in both of his legs, causing him to fall back into his recliner. He states he was finally able to get up to go the bathroom and his dizziness worsened while he was ambulating. He reports numbness and tingling in the left side of his face that radiated down to his left shoulder, down into his left arm, and down to his left leg. He also reports a headache and blurry vision. He states the dizziness has completely resolved at this time and the weakness and numbness has improved. He reports he had left anterior chest pain at the time he had the dizziness but this has also resolved. A CT angiogram of the head and neck and CT cerebral perfusion performed in the ED were negative for evidence of an acute infarct. There was 55% stenosis within the proximal portion of the left ICA and 45 to 50% stenosis within the proximal portion of the right ICA. There is moderate stenoses within the origins of both vertebral arteries. On arrival to the ED, he had a blood pressure of 194/81 and his pressure reached 228/121. He received a dose of IV  labetalol. He declined TNK in the ED. He has been admitted for further evaluation.        History of Present Illness  Review of Systems   Constitutional:  Negative for chills and fever.   HENT:  Negative for congestion and sore throat.    Eyes:  Negative for photophobia and visual disturbance.   Respiratory:  Negative for cough, shortness of breath and wheezing.    Cardiovascular:  Positive for chest pain. Negative for palpitations and leg swelling.   Gastrointestinal:  Negative for abdominal pain, nausea and vomiting.   Musculoskeletal:  Negative for arthralgias and myalgias.   Skin:  Negative for color change and pallor.   Neurological:  Positive for dizziness, weakness, numbness and headaches. Negative for tremors, seizures, syncope, facial asymmetry, speech difficulty and light-headedness.        Personal History     Past Medical History:   Diagnosis Date    Anxiety and depression     Aortic stenosis     Arthritis     Coronary artery disease     CTS (carpal tunnel syndrome)     Degenerative joint disease (DJD) of lumbar spine     Diabetes mellitus     Diabetic neuropathy     Difficulty walking     Diverticulosis     COLVIN (dyspnea on exertion)     GERD (gastroesophageal reflux disease)     Headache, tension-type     Heart murmur     History of cellulitis     LEFT LEG    HL (hearing loss)     Hypertension     STEF (obstructive sleep apnea)     STEF on auto CPAP     Stroke     Vision loss      Past Surgical History:   Procedure Laterality Date    BACK SURGERY      X2    CARDIAC CATHETERIZATION N/A 3/21/2019    Procedure: Coronary angiography;  Surgeon: Wendy Braxton MD;  Location: [facility] CATH INVASIVE LOCATION;  Service: Cardiology    CORONARY ARTERY BYPASS GRAFT WITH AORTIC VALVE REPAIR/REPLACEMENT N/A 3/26/2019    Procedure: INTRAOP QUIQUE; CORONARY ARTERY BYPASS X 1 UTILIZING ENDOSCOPICALLY HARVESTED RIGHT GREATER SAPHENOUS VEIN; AORTIC VALVE REPLACEMENT AND PRP.;  Surgeon: Alexx Gandara MD;  Location: [facility]  MAIN OR;  Service: Cardiothoracic    POSTERIOR CERVICAL FUSION      TENDON REPAIR Left     KNEE    WRIST SURGERY Bilateral      Family History   Problem Relation Age of Onset    Heart disease Mother     Arthritis Mother     Cancer Father     Hypertension Brother     Diabetes Brother     Hypertension Brother     Parkinsonism Sister     Arthritis Sister     Heart disease Maternal Grandmother     Heart disease Maternal Grandfather     Heart disease Paternal Grandmother     Malig Hyperthermia Neg Hx      Social History     Tobacco Use    Smoking status: Former     Current packs/day: 0.00     Types: Cigarettes     Quit date:      Years since quittin.8    Smokeless tobacco: Never    Tobacco comments:     Smoked between the ages of 25 and 40.   Vaping Use    Vaping status: Never Used   Substance Use Topics    Alcohol use: No    Drug use: No     Medications Prior to Admission   Medication Sig Dispense Refill Last Dose/Taking    albuterol sulfate  (90 Base) MCG/ACT inhaler Inhale 2 puffs Every 4 (Four) Hours As Needed for Wheezing or Shortness of Air.   Taking As Needed    ascorbic acid (VITAMIN C) 1000 MG tablet Take 1 tablet by mouth Daily.   Taking    atorvastatin (LIPITOR) 40 MG tablet Take 1 tablet by mouth Daily.   Taking    carvedilol (COREG) 25 MG tablet Take 1 tablet by mouth Every 12 (Twelve) Hours. 60 tablet 2 Taking    clonazePAM (KlonoPIN) 1 MG tablet Take 2 tablets by mouth Every Night.   Taking    clopidogrel (PLAVIX) 75 MG tablet Take 1 tablet by mouth Daily.   Taking    fenofibrate (TRICOR) 145 MG tablet TAKE 1 TABLET EVERY DAY (Patient taking differently: Take 1 tablet by mouth Daily.) 90 tablet 3 Taking Differently    furosemide (LASIX) 40 MG tablet Take 2 tablets by mouth Daily. (Patient taking differently: Take 1 tablet by mouth Daily.) 90 tablet 3 Taking Differently    gabapentin (NEURONTIN) 300 MG capsule Take 1 capsule by mouth 3 (Three) Times a Day.   Taking    glimepiride (AMARYL) 1  MG tablet Take 0.5 tablets by mouth Every Morning Before Breakfast.   Taking    lisinopril (PRINIVIL,ZESTRIL) 20 MG tablet Take 1 tablet by mouth Daily.   Taking    loratadine (CLARITIN) 10 MG tablet Take 1 tablet by mouth Daily.   Taking    metFORMIN (GLUCOPHAGE) 1000 MG tablet Take 1 tablet by mouth 2 (Two) Times a Day With Meals.   Taking    Multiple Vitamins-Minerals (ICAPS AREDS 2 PO) Take 1 tablet by mouth 2 (Two) Times a Day.   Taking    olopatadine (PATANOL) 0.1 % ophthalmic solution Apply 1 drop to eye(s) as directed by provider 2 (Two) Times a Day.   Taking    sertraline (ZOLOFT) 50 MG tablet Take 1 tablet by mouth Daily.   Taking    tamsulosin (FLOMAX) 0.4 MG capsule 24 hr capsule Take 1 capsule by mouth Every Night.   Taking    vitamin B-12 (VITAMIN B-12) 1000 MCG tablet Take 1 tablet by mouth Daily. 30 tablet 0 Taking    isosorbide mononitrate (IMDUR) 30 MG 24 hr tablet Take 1 tablet by mouth Daily. 30 tablet 11     potassium chloride (K-DUR,KLOR-CON) 10 MEQ CR tablet Take 1 tablet by mouth Daily.   Unknown     Allergies:    Allergies   Allergen Reactions    Contrast Dye (Echo Or Unknown Ct/Mr) Hives     Hives and rash reported    Iodine Unknown - Low Severity    Latex Hives       Objective    Objective     Vital Signs  Temp:  [98.1 °F (36.7 °C)-98.2 °F (36.8 °C)] 98.1 °F (36.7 °C)  Heart Rate:  [59-77] 77  Resp:  [16-20] 16  BP: (184-228)/() 184/87  SpO2:  [90 %-99 %] 90 %  on   ;      There is no height or weight on file to calculate BMI.    Physical Exam  Vitals and nursing note reviewed.   Constitutional:       Appearance: Normal appearance.   HENT:      Head: Normocephalic and atraumatic.      Nose: Nose normal.      Mouth/Throat:      Mouth: Mucous membranes are dry.      Pharynx: Oropharynx is clear.   Eyes:      Extraocular Movements: Extraocular movements intact.      Pupils: Pupils are equal, round, and reactive to light.   Cardiovascular:      Rate and Rhythm: Normal rate and regular  rhythm.      Pulses: Normal pulses.      Heart sounds: Normal heart sounds.   Pulmonary:      Effort: Pulmonary effort is normal.      Breath sounds: Normal breath sounds.   Abdominal:      General: Bowel sounds are normal.      Palpations: Abdomen is soft.   Musculoskeletal:         General: Normal range of motion.      Cervical back: Normal range of motion and neck supple.      Right lower leg: Edema (1+ pedal edema) present.      Left lower leg: Edema (1+ pedal edema) present.   Skin:     General: Skin is warm and dry.   Neurological:      Mental Status: He is alert and oriented to person, place, and time.      Cranial Nerves: No cranial nerve deficit, dysarthria or facial asymmetry.      Motor: Weakness present.      Comments: Generalized weakness that is worse in the left upper and left lower extremity   Psychiatric:         Mood and Affect: Mood normal.         Behavior: Behavior normal.         Results Review:  I reviewed the patient's new clinical results.  I reviewed the patient's new imaging results and agree with the interpretation.  I reviewed the patient's other test results and agree with the interpretation  I personally viewed and interpreted the patient's EKG/Telemetry data  Discussed with ED provider.    Lab Results (last 24 hours)       Procedure Component Value Units Date/Time    CBC & Differential [[id number]]  (Normal) Collected: 11/01/24 1708    Specimen: Blood Updated: 11/01/24 1744    Narrative:      The following orders were created for panel order CBC & Differential.  Procedure                               Abnormality         Status                     ---------                               -----------         ------                     CBC Auto Differential[[id number]]        Normal              Final result                 Please view results for these tests on the individual orders.    Comprehensive Metabolic Panel [[id number]]  (Abnormal) Collected: 11/01/24 1708    Specimen: Blood  Updated: 11/01/24 1811     Glucose 111 mg/dL      BUN 15 mg/dL      Creatinine 1.09 mg/dL      Sodium 143 mmol/L      Potassium 4.1 mmol/L      Comment: Slight hemolysis detected by analyzer. Result may be falsely elevated.        Chloride 102 mmol/L      CO2 29.0 mmol/L      Calcium 10.0 mg/dL      Total Protein 7.4 g/dL      Albumin 4.3 g/dL      ALT (SGPT) 18 U/L      AST (SGOT) 23 U/L      Comment: Slight hemolysis detected by analyzer. Result may be falsely elevated.        Alkaline Phosphatase 72 U/L      Total Bilirubin 0.4 mg/dL      Globulin 3.1 gm/dL      A/G Ratio 1.4 g/dL      BUN/Creatinine Ratio 13.8     Anion Gap 12.0 mmol/L      eGFR 68.2 mL/min/1.73     Narrative:      GFR Normal >60  Chronic Kidney Disease <60  Kidney Failure <15    The GFR formula is only valid for adults with stable renal function between ages 18 and 70.    Protime-INR [[id number]]  (Normal) Collected: 11/01/24 1708    Specimen: Blood Updated: 11/01/24 1805     Protime 13.4 Seconds      INR 1.00    aPTT [[id number]]  (Normal) Collected: 11/01/24 1708    Specimen: Blood Updated: 11/01/24 1805     PTT 32.4 seconds     Single High Sensitivity Troponin T [[id number]]  (Normal) Collected: 11/01/24 1708    Specimen: Blood Updated: 11/01/24 1806     HS Troponin T 21 ng/L     Narrative:      High Sensitive Troponin T Reference Range:  <14.0 ng/L- Negative Female for AMI  <22.0 ng/L- Negative Male for AMI  >=14 - Abnormal Female indicating possible myocardial injury.  >=22 - Abnormal Male indicating possible myocardial injury.   Clinicians would have to utilize clinical acumen, EKG, Troponin, and serial changes to determine if it is an Acute Myocardial Infarction or myocardial injury due to an underlying chronic condition.         CBC Auto Differential [[id number]]  (Normal) Collected: 11/01/24 1708    Specimen: Blood Updated: 11/01/24 1744     WBC 7.71 10*3/mm3      RBC 4.96 10*6/mm3      Hemoglobin 14.2 g/dL      Hematocrit 42.7 %      MCV  86.1 fL      MCH 28.6 pg      MCHC 33.3 g/dL      RDW 14.1 %      RDW-SD 43.0 fl      MPV 11.4 fL      Platelets 230 10*3/mm3      Neutrophil % 65.6 %      Lymphocyte % 21.0 %      Monocyte % 8.6 %      Eosinophil % 4.2 %      Basophil % 0.5 %      Immature Grans % 0.1 %      Neutrophils, Absolute 5.06 10*3/mm3      Lymphocytes, Absolute 1.62 10*3/mm3      Monocytes, Absolute 0.66 10*3/mm3      Eosinophils, Absolute 0.32 10*3/mm3      Basophils, Absolute 0.04 10*3/mm3      Immature Grans, Absolute 0.01 10*3/mm3      nRBC 0.0 /100 WBC     POC Glucose Once [[id number]]  (Abnormal) Collected: 11/01/24 2202    Specimen: Blood Updated: 11/01/24 2204     Glucose 156 mg/dL             Imaging Results (Last 24 Hours)       Procedure Component Value Units Date/Time    CT Angiogram Head w AI Analysis of LVO [[id number]] Collected: 11/01/24 1814     Updated: 11/01/24 2049    Narrative:      CT ANGIOGRAM OF THE HEAD AND NECK AND CT PERFUSION STUDY     CLINICAL HISTORY:  Stroke, follow up     TECHNIQUE:  A noncontrast head CT was performed with 3 mm axial images.  Thereafter, a CT perfusion study was performed after the dynamic bolus  of IV contrast. Standard perfusion maps were constructed with RAPID  software. A CT angiogram of the head and neck was then performed with 1  mm axial images. Sagittal, coronal, and 3-dimensional reconstructed  images were obtained. Finally, a delayed postcontrast head CT was  performed with 3 mm axial images.     FINDINGS:     NONCONTRAST HEAD CT: Mild changes of chronic small vessel ischemic  phenomena. Otherwise, no convincing evidence for an area of acute  infarction.     Incidental note of mild inflammatory paranasal sinus disease with  mucosal thickening involving the ethmoid air cells and the sphenoid  sinus.     CT PERFUSION STUDY: No asymmetries evident on the Tmax greater than 6  seconds or the CBF less than 30% maps. Scattered areas of apparent  hypoperfusion noted within both cerebral and  cerebellar hemispheres on  the Tmax greater than 4-second maps which are felt to most likely be  artifactual in nature. However, further evaluation could be performed  with MR imaging.     CTA NECK:       AORTIC ARCH: Classic configuration. Mild atherosclerotic changes.  SUBCLAVIAN ARTERIES: Mild stenosis of the left subclavian artery  proximal to the origin of the left vertebral artery.  VERTEBRAL ARTERIES: Moderate stenoses at the origins of the vertebral  arteries.  CCA/ICAs: Mild stenosis at the origin of the left common carotid artery.  Approximate 55% NASCET stenosis within the proximal portion of the left  ICA. Approximate 45% to 50% NASCET stenosis within the proximal portion  of the right ICA.     CTA HEAD:     ANTERIOR CIRCULATION: Mild degrees of stenoses within the carotid  siphons. Unremarkable appearance of the middle and anterior cerebral  arteries.  POSTERIOR CIRCULATION: Moderate stenosis within the V4 segment of the  left vertebral artery. Moderate stenosis of the left P3 segment.  DELAYED POSTCONTRAST HEAD CT: No abnormal foci of contrast enhancement  are noted within the brain parenchyma.       Impression:         No convincing evidence to suggest an acute infarct on the noncontrast  head CT or the CT perfusion study. No significant perfusion asymmetries  are evident on the Tmax greater than 6 seconds or CBF less than 30%  maps.     Approximate 55% stenosis within the proximal portion of the left ICA and  45 to 50% stenosis within the proximal portion of the right ICA by  NASCET criteria.     Moderate stenoses within the origins of both vertebral arteries.     Moderate stenosis of the left P3 segment.     The findings of the noncontrast head CT were discussed with Dr. eHrcules on 11/01/2024 at approximately 5:26 p.m. The findings of the  CT angiogram and CT perfusion study were discussed with Dr. Hercules at  approximately 5:40 p.m.        Radiation dose reduction techniques were utilized,  including automated  exposure control and exposure modulation based on body size.        This report was finalized on 11/1/2024 8:46 PM by Dr. Max Sánchez M.D  on Workstation: [id number]       CT Angiogram Neck [[id number]] Collected: 11/01/24 1814     Updated: 11/01/24 2049    Narrative:      CT ANGIOGRAM OF THE HEAD AND NECK AND CT PERFUSION STUDY     CLINICAL HISTORY:  Stroke, follow up     TECHNIQUE:  A noncontrast head CT was performed with 3 mm axial images.  Thereafter, a CT perfusion study was performed after the dynamic bolus  of IV contrast. Standard perfusion maps were constructed with RAPID  software. A CT angiogram of the head and neck was then performed with 1  mm axial images. Sagittal, coronal, and 3-dimensional reconstructed  images were obtained. Finally, a delayed postcontrast head CT was  performed with 3 mm axial images.     FINDINGS:     NONCONTRAST HEAD CT: Mild changes of chronic small vessel ischemic  phenomena. Otherwise, no convincing evidence for an area of acute  infarction.     Incidental note of mild inflammatory paranasal sinus disease with  mucosal thickening involving the ethmoid air cells and the sphenoid  sinus.     CT PERFUSION STUDY: No asymmetries evident on the Tmax greater than 6  seconds or the CBF less than 30% maps. Scattered areas of apparent  hypoperfusion noted within both cerebral and cerebellar hemispheres on  the Tmax greater than 4-second maps which are felt to most likely be  artifactual in nature. However, further evaluation could be performed  with MR imaging.     CTA NECK:       AORTIC ARCH: Classic configuration. Mild atherosclerotic changes.  SUBCLAVIAN ARTERIES: Mild stenosis of the left subclavian artery  proximal to the origin of the left vertebral artery.  VERTEBRAL ARTERIES: Moderate stenoses at the origins of the vertebral  arteries.  CCA/ICAs: Mild stenosis at the origin of the left common carotid artery.  Approximate 55% NASCET stenosis within the  proximal portion of the left  ICA. Approximate 45% to 50% NASCET stenosis within the proximal portion  of the right ICA.     CTA HEAD:     ANTERIOR CIRCULATION: Mild degrees of stenoses within the carotid  siphons. Unremarkable appearance of the middle and anterior cerebral  arteries.  POSTERIOR CIRCULATION: Moderate stenosis within the V4 segment of the  left vertebral artery. Moderate stenosis of the left P3 segment.  DELAYED POSTCONTRAST HEAD CT: No abnormal foci of contrast enhancement  are noted within the brain parenchyma.       Impression:         No convincing evidence to suggest an acute infarct on the noncontrast  head CT or the CT perfusion study. No significant perfusion asymmetries  are evident on the Tmax greater than 6 seconds or CBF less than 30%  maps.     Approximate 55% stenosis within the proximal portion of the left ICA and  45 to 50% stenosis within the proximal portion of the right ICA by  NASCET criteria.     Moderate stenoses within the origins of both vertebral arteries.     Moderate stenosis of the left P3 segment.     The findings of the noncontrast head CT were discussed with Dr. Herclues on 11/01/2024 at approximately 5:26 p.m. The findings of the  CT angiogram and CT perfusion study were discussed with Dr. Hercules at  approximately 5:40 p.m.        Radiation dose reduction techniques were utilized, including automated  exposure control and exposure modulation based on body size.        This report was finalized on 11/1/2024 8:46 PM by Dr. Max Sánchez M.D  on Workstation: [id number]       CT CEREBRAL PERFUSION WITH & WITHOUT CONTRAST [[id number]] Collected: 11/01/24 1814     Updated: 11/01/24 2049    Narrative:      CT ANGIOGRAM OF THE HEAD AND NECK AND CT PERFUSION STUDY     CLINICAL HISTORY:  Stroke, follow up     TECHNIQUE:  A noncontrast head CT was performed with 3 mm axial images.  Thereafter, a CT perfusion study was performed after the dynamic bolus  of IV contrast.  Standard perfusion maps were constructed with RAPID  software. A CT angiogram of the head and neck was then performed with 1  mm axial images. Sagittal, coronal, and 3-dimensional reconstructed  images were obtained. Finally, a delayed postcontrast head CT was  performed with 3 mm axial images.     FINDINGS:     NONCONTRAST HEAD CT: Mild changes of chronic small vessel ischemic  phenomena. Otherwise, no convincing evidence for an area of acute  infarction.     Incidental note of mild inflammatory paranasal sinus disease with  mucosal thickening involving the ethmoid air cells and the sphenoid  sinus.     CT PERFUSION STUDY: No asymmetries evident on the Tmax greater than 6  seconds or the CBF less than 30% maps. Scattered areas of apparent  hypoperfusion noted within both cerebral and cerebellar hemispheres on  the Tmax greater than 4-second maps which are felt to most likely be  artifactual in nature. However, further evaluation could be performed  with MR imaging.     CTA NECK:       AORTIC ARCH: Classic configuration. Mild atherosclerotic changes.  SUBCLAVIAN ARTERIES: Mild stenosis of the left subclavian artery  proximal to the origin of the left vertebral artery.  VERTEBRAL ARTERIES: Moderate stenoses at the origins of the vertebral  arteries.  CCA/ICAs: Mild stenosis at the origin of the left common carotid artery.  Approximate 55% NASCET stenosis within the proximal portion of the left  ICA. Approximate 45% to 50% NASCET stenosis within the proximal portion  of the right ICA.     CTA HEAD:     ANTERIOR CIRCULATION: Mild degrees of stenoses within the carotid  siphons. Unremarkable appearance of the middle and anterior cerebral  arteries.  POSTERIOR CIRCULATION: Moderate stenosis within the V4 segment of the  left vertebral artery. Moderate stenosis of the left P3 segment.  DELAYED POSTCONTRAST HEAD CT: No abnormal foci of contrast enhancement  are noted within the brain parenchyma.       Impression:          No convincing evidence to suggest an acute infarct on the noncontrast  head CT or the CT perfusion study. No significant perfusion asymmetries  are evident on the Tmax greater than 6 seconds or CBF less than 30%  maps.     Approximate 55% stenosis within the proximal portion of the left ICA and  45 to 50% stenosis within the proximal portion of the right ICA by  NASCET criteria.     Moderate stenoses within the origins of both vertebral arteries.     Moderate stenosis of the left P3 segment.     The findings of the noncontrast head CT were discussed with Dr. Hercules on 11/01/2024 at approximately 5:26 p.m. The findings of the  CT angiogram and CT perfusion study were discussed with Dr. Hercules at  approximately 5:40 p.m.        Radiation dose reduction techniques were utilized, including automated  exposure control and exposure modulation based on body size.        This report was finalized on 11/1/2024 8:46 PM by Dr. Max Sánchez M.D  on Workstation: [id number]       XR Chest 1 View [[id number]] Collected: 11/01/24 1857     Updated: 11/01/24 1901    Narrative:      XR CHEST 1 VW-     HISTORY: Male who is 79 years-old, stroke protocol     TECHNIQUE: Frontal view of the chest     COMPARISON: 3/29/2019     FINDINGS: The heart size is normal. Pulmonary vasculature is congested.  Sternotomy wires are present. No focal pulmonary consolidation, pleural  effusion, or pneumothorax. No acute osseous process.       Impression:      No focal pulmonary consolidation. Pulmonary vascular  congestion.      This report was finalized on 11/1/2024 6:58 PM by Dr. Rich Basnal M.D on Workstation: [id number]               Results for orders placed during the hospital encounter of 05/13/24    Adult Transthoracic Echo Complete w/ Color, Spectral and Contrast if Necessary Per Protocol    Interpretation Summary    Left ventricular systolic function is normal. Calculated left ventricular EF = 62.7%    Left ventricular diastolic  function was normal.    There is a bioprosthetic aortic valve present.    Aortic valve area is 1.2 cm2.    Peak velocity of the flow distal to the aortic valve is 247 cm/s. Aortic valve maximum pressure gradient is 24 mmHg. Aortic valve mean pressure gradient is 12 mmHg. Aortic valve dimensionless index is 0.4 .    Estimated right ventricular systolic pressure from tricuspid regurgitation is moderately elevated (45-55 mmHg). Calculated right ventricular systolic pressure from tricuspid regurgitation is 45 mmHg.      ECG 12 Lead Stroke Evaluation   Preliminary Result   HEART RATE=69  bpm   RR Xgiokicg=364  ms   [place] Oemfofos=502  ms   P Horizontal Axis=-32  deg   P Front Axis=66  deg   QRSD Jduwkvfw=599  ms   QT Gqfwuscs=551  ms   GCfT=014  ms   QRS Axis=-30  deg   T Wave Axis=Invalid  deg   - ABNORMAL ECG -   Sinus rhythm   Prolonged [place] interval   Probable  left atrial enlargement   Right bundle branch block   Date and Time of Study:2024-11-01 18:01:51      Telemetry Scan   Final Result           Assessment/Plan     Active Hospital Problems    Diagnosis  POA    **Stroke-like symptoms [R29.90]  Yes    History of stroke [Z86.73]  Not Applicable    Type 2 diabetes mellitus, without long-term current use of insulin [E11.9]  Yes    Hypertension [I10]  Yes    Coronary artery disease involving native coronary artery of native heart [I25.10]  Yes    Nonrheumatic aortic valve stenosis [I35.0]  Yes    STEF on auto CPAP [G47.33]  Yes    Class 3 severe obesity due to excess calories with serious comorbidity and body mass index (BMI) of 40.0 to 44.9 in adult [E66.813, E66.01, Z68.41]  Not Applicable       Stroke-like symptoms  -Admit to a neuro telemetry unit to complete stroke workup  -He has left lower extremity weakness on exam, no other focal deficits  -He declined TNK  -Neurology consult  -Check MRI and echo  -Check TSH, lipid panel, and hgb A1C to assess for secondary risk factors  -Neuro checks/NIHSS  -PT/OT  consults    Hypertension Urgency  -He has been hypertensive but his pressures have improved after receiving labetalol in the ED. Continue Coreg for now. Hold lasix and lisinopril to allow for permissive hypertension  -Will add PRN Labetalol for SBP greater than 220 or DBP greater than 120  -Monitor    Chest Pain  -Resolved  -Troponin is normal and there was no ischemic changes on the EKG  -Monitor on telemetry    CAD  History of Stroke  -He has a history of thalmic stroke  -Continue Plavix  -Start high dose statin and daily aspirin    Type 2 Diabetes Mellitus  -Hold oral diabetic medications  -Initiate correctional factor insulin  -Monitor blood sugars ACHS  -Continue home dose of gabapentin for peripheral neuropathy  -NCS diet    STEF  -He may use his home CPAP  -Supplemental oxygen as needed at night  -Continuous pulse oximetry    BPH  -Continue Flomax    Obesity  -Complicating all of the above    Depression  Anxiety  -Continue Zoloft and Clonazepam    -I discussed the patients findings and my recommendations with patient.    VTE Prophylaxis - SCDs.  Code Status - Full code.       KEVIN Ramirez  [facility]  11/01/24  22:39 EDT

## 2024-11-02 NOTE — THERAPY EVALUATION
Patient Name: Bird Gallegos  : 1945    MRN: [id number]                              Today's Date: 2024       Admit Date: 2024    Visit Dx:     ICD-10-CM ICD-9-CM   1. Stroke-like symptoms  R29.90 781.99   2. Hypertension, unspecified type  I10 401.9   3. Hyperglycemia  R73.9 790.29   4. History of CAD (coronary artery disease)  Z86.79 V12.59     Patient Active Problem List   Diagnosis    STEF on auto CPAP    Class 3 severe obesity due to excess calories with serious comorbidity and body mass index (BMI) of 40.0 to 44.9 in adult    Nonrheumatic aortic valve stenosis    Aortic valve stenosis    Coronary artery disease involving native coronary artery of native heart    Paresthesias    Diabetic neuropathy    Type 2 diabetes mellitus, without long-term current use of insulin    Hypertension    Thalamic stroke    Stroke-like symptoms    History of stroke     Past Medical History:   Diagnosis Date    Anxiety and depression     Aortic stenosis     Arthritis     Coronary artery disease     CTS (carpal tunnel syndrome)     Degenerative joint disease (DJD) of lumbar spine     Diabetes mellitus     Diabetic neuropathy     Difficulty walking     Diverticulosis     COLVIN (dyspnea on exertion)     GERD (gastroesophageal reflux disease)     Headache, tension-type     Heart murmur     History of cellulitis     LEFT LEG    HL (hearing loss)     Hypertension     STEF (obstructive sleep apnea)     STEF on auto CPAP     Stroke     Vision loss      Past Surgical History:   Procedure Laterality Date    BACK SURGERY      X2    CARDIAC CATHETERIZATION N/A 3/21/2019    Procedure: Coronary angiography;  Surgeon: Wendy Braxton MD;  Location: [facility] INVASIVE LOCATION;  Service: Cardiology    CORONARY ARTERY BYPASS GRAFT WITH AORTIC VALVE REPAIR/REPLACEMENT N/A 3/26/2019    Procedure: INTRAOP QUIQUE; CORONARY ARTERY BYPASS X 1 UTILIZING ENDOSCOPICALLY HARVESTED RIGHT GREATER SAPHENOUS VEIN; AORTIC VALVE REPLACEMENT AND PRP.;   Surgeon: Alexx Gandara MD;  Location: [facility] MAIN OR;  Service: Cardiothoracic    POSTERIOR CERVICAL FUSION      TENDON REPAIR Left     KNEE    WRIST SURGERY Bilateral       General Information       Row Name 11/02/24 1418          OT Time and Intention    Subjective Information complains of;pain  headache  -     Document Type evaluation  -     Mode of Treatment individual therapy;occupational therapy  -     Patient Effort excellent  -     Symptoms Noted During/After Treatment dizziness  -     Comment headache  -       Row Name 11/02/24 1418          General Information    Patient Profile Reviewed yes  -     Prior Level of Function independent:  -     Existing Precautions/Restrictions fall  -       Row Name 11/02/24 1418          Living Environment    People in Home alone  -       Row Name 11/02/24 1418          Home Main Entrance    Number of Stairs, Main Entrance none  -     Stair Railings, Main Entrance none  -       Row Name 11/02/24 1418          Cognition    Orientation Status (Cognition) oriented x 4  -       Row Name 11/02/24 1418          Safety Issues/Impairments Affecting Functional Mobility    Impairments Affecting Function (Mobility) balance;endurance/activity tolerance;pain  -               User Key  (r) = Recorded By, (t) = Taken By, (c) = Cosigned By      Initials Name Provider Type     Alesha Don, OT Occupational Therapist                     Mobility/ADL's       Row Name 11/02/24 1419          Bed Mobility    Bed Mobility bed mobility (all) activities  -     All Activities, [place] (Bed Mobility) standby assist;verbal cues  -       Row Name 11/02/24 1419          Transfers    Transfers sit-stand transfer;bed-chair transfer  -       Row Name 11/02/24 1419          Bed-Chair Transfer    Bed-Chair [place] (Transfers) contact guard;verbal cues;nonverbal cues (demo/gesture)  -     Assistive Device (Bed-Chair Transfers) walker, front-wheeled  -        Row Name 11/02/24 1419          Sit-Stand Transfer    Sit-Stand [place] (Transfers) contact guard;minimum assist (75% patient effort);verbal cues  -     Assistive Device (Sit-Stand Transfers) walker, front-wheeled  [facility]       Row Name 11/02/24 1419          Functional Mobility    Functional Mobility- Ind. Level verbal cues required;nonverbal cues required (demo/gesture);contact guard assist  -     Functional Mobility- Device walker, front-wheeled  -     Patient was able to Ambulate yes  -               User Key  (r) = Recorded By, (t) = Taken By, (c) = Cosigned By      Initials Name Provider Type     Alesha Don, OT Occupational Therapist                   Obj/Interventions       [facility] Name 11/02/24 1420          Vision Assessment/Intervention    Visual Impairment/Limitations blurry vision  -LH       Row Name 11/02/24 1420          Range of Motion Comprehensive    Comment, General Range of Motion LROM to 50%, RUE ROM WFL  -[facility] Name 11/02/24 1420          Strength Comprehensive (MMT)    General Manual Muscle Testing (MMT) Assessment upper extremity strength deficits identified  -     Comment, General Manual Muscle Testing (MMT) Assessment RUE 4/5, LUE 3-/5  -[facility] Name 11/02/24 1420          Motor Skills    Motor Skills functional endurance  -     Functional Endurance Fair  -[facility] Name 11/02/24 1420          Balance    Balance Assessment sitting static balance;sitting dynamic balance;sit to stand dynamic balance;standing static balance;standing dynamic balance  -     Static Sitting Balance standby assist  -     Dynamic Sitting Balance standby assist  -     Position, Sitting Balance sitting edge of bed  -     Sit to Stand Dynamic Balance contact guard  -     Static Standing Balance contact guard  -     Dynamic Standing Balance contact guard  -     Position/Device Used, Standing Balance walker, front-wheeled  -     Balance Interventions sitting;standing;sit  to stand;occupation based/functional task  -               User Key  (r) = Recorded By, (t) = Taken By, (c) = Cosigned By      Initials Name Provider Type     Alesha Don, OT Occupational Therapist                   Goals/Plan       Row Name 11/02/24 1428          Bed Mobility Goal 1 (OT)    Activity/Assistive Device (Bed Mobility Goal 1, OT) bed mobility activities, all  -     [place] Level/Cues Needed (Bed Mobility Goal 1, OT) modified independence  [facility]     Time Frame (Bed Mobility Goal 1, OT) short term goal (STG);2 weeks  -[facility] Name 11/02/24 1428          Transfer Goal 1 (OT)    Activity/Assistive Device (Transfer Goal 1, OT) transfers, all  -     [place] Level/Cues Needed (Transfer Goal 1, OT) modified independence  [facility]     Time Frame (Transfer Goal 1, OT) short term goal (STG);2 weeks  -     Progress/Outcome (Transfer Goal 1, OT) new goal  -[facility] Name 11/02/24 1428          Dressing Goal 1 (OT)    Activity/Device (Dressing Goal 1, OT) dressing skills, all;upper body dressing;lower body dressing  -     [place]/Cues Needed (Dressing Goal 1, OT) modified [facility]     Time Frame (Dressing Goal 1, OT) short term goal (STG);2 weeks  -     Progress/Outcome (Dressing Goal 1, OT) new goal  -[facility] Name 11/02/24 1428          Toileting Goal 1 (OT)    Activity/Device (Toileting Goal 1, OT) toileting skills, all  -     [place] Level/Cues Needed (Toileting Goal 1, OT) modified independence  [facility]     Time Frame (Toileting Goal 1, OT) short term goal (STG);2 weeks  -     Progress/Outcome (Toileting Goal 1, OT) new goal  -       Row Name 11/02/24 1428          Grooming Goal 1 (OT)    Activity/Device (Grooming Goal 1, OT) grooming skills, all  -     [place] (Grooming Goal 1, OT) modified independence  [facility]     Time Frame (Grooming Goal 1, OT) short term goal (STG);2 weeks  -     Progress/Outcome (Grooming Goal 1, OT) new goal  -[facility] Name  11/02/24 1428          Therapy Assessment/Plan (OT)    Planned Therapy Interventions (OT) activity tolerance training;BADL retraining;functional balance retraining;occupation/activity based interventions;patient/caregiver education/training;strengthening exercise;transfer/mobility retraining  -               User Key  (r) = Recorded By, (t) = Taken By, (c) = Cosigned By      Initials Name Provider Type     Alesha Don, NIKOLAS Occupational Therapist                   Clinical Impression       Row Name 11/02/24 1421          Pain Assessment    Pretreatment Pain Rating 7/10  -     Posttreatment Pain Rating 7/10  -     Pain Location head  -       Row Name 11/02/24 1421          Plan of Care Review    Plan of Care Reviewed With patient  -     Outcome Evaluation Patient is an 79 year old female admitted to [facility] with stroke-like symptoms, continuing with imaging but per neuro likely caused by hypertensive urgency. Patient reports that he lives alone, uses a walker within his home, and is independent with ADLs and IADLs. Patient also reports that he has been planning to move in with son, and may do that soon. Patient performed bed mobility with SBA, with verbal cues for hand placement and body mechanics, performed sit to stand and bed to chair transfer with CGA using walker. He was able to ambulate with CGA within hospital room using rwx. Patient reports generalized weakness in LUE and LLE, and headache at 7/10 in left side of head and neck. Patient will benefit from skilled OT during acute hospital stay, to improve generalized deconditioning L UE strength and coordination, and ADL re-training as needed. Recommend dc to home with 24 hour support and HH vs SNF pending progress.  -       Row Name 11/02/24 1421          Therapy Assessment/Plan (OT)    Rehab Potential (OT) good  -     Criteria for Skilled Therapeutic Interventions Met (OT) yes;meets criteria;skilled treatment is necessary  -     Therapy Frequency  (OT) 5 times/wk  -       Row Name 11/02/24 1421          Therapy Plan Review/Discharge Plan (OT)    Anticipated Discharge Disposition (OT) home;home with 24/7 care;home with home health;skilled nursing facility  -       Row Name 11/02/24 1421          Positioning and Restraints    Pre-Treatment Position in bed  -     Post Treatment Position chair  -     In Chair reclined;call light within reach;encouraged to call for assist;exit alarm on  -               User Key  (r) = Recorded By, (t) = Taken By, (c) = Cosigned By      Initials Name Provider Type    Alesha Rodney OT Occupational Therapist                   Outcome Measures       Row Name 11/02/24 1428          How much help from another is currently needed...    Putting on and taking off regular lower body clothing? 3  -     Bathing (including washing, rinsing, and drying) 3  -LH     Toileting (which includes using toilet bed pan or urinal) 4  -     Putting on and taking off regular upper body clothing 4  -     Taking care of personal grooming (such as brushing teeth) 4  -     Eating meals 4  -     AM-PAC 6 Clicks Score (OT) 22  -       Row Name 11/02/24 1428          Functional Assessment    Outcome Measure Options AM-PAC 6 Clicks Daily Activity (OT)  -               User Key  (r) = Recorded By, (t) = Taken By, (c) = Cosigned By      Initials Name Provider Type    Alesha Rodney OT Occupational Therapist                    Occupational Therapy Education       Title: PT OT SLP Therapies (In Progress)       Topic: Occupational Therapy (Done)       Point: ADL training (Done)       Description:   Instruct learner(s) on proper safety adaptation and remediation techniques during self care or transfers.   Instruct in proper use of assistive devices.                  Learning Progress Summary            Patient Acceptance, E,TB, VU by  at 11/2/2024 1423    Comment: Patient is instructed in role of OT, discharge planning, home safety, fall  prevention                      Point: Home exercise program (Done)       Description:   Instruct learner(s) on appropriate technique for monitoring, assisting and/or progressing therapeutic exercises/activities.                  Learning Progress Summary            Patient Acceptance, E,TB, VU by  at 11/2/2024 1429    Comment: Patient is instructed in role of OT, discharge planning, home safety, fall prevention                      Point: Precautions (Done)       Description:   Instruct learner(s) on prescribed precautions during self-care and functional transfers.                  Learning Progress Summary            Patient Acceptance, E,TB, VU by  at 11/2/2024 1429    Comment: Patient is instructed in role of OT, discharge planning, home safety, fall prevention                      Point: Body mechanics (Done)       Description:   Instruct learner(s) on proper positioning and spine alignment during self-care, functional mobility activities and/or exercises.                  Learning Progress Summary            Patient Acceptance, E,TB, VU by  at 11/2/2024 1427    Comment: Patient is instructed in role of OT, discharge planning, home safety, fall prevention                                      User Key       Initials Effective Dates Name Provider Type Discipline     08/31/23 -  Alesha Don OT Occupational Therapist OT                  OT Recommendation and Plan  Planned Therapy Interventions (OT): activity tolerance training, BADL retraining, functional balance retraining, occupation/activity based interventions, patient/caregiver education/training, strengthening exercise, transfer/mobility retraining  Therapy Frequency (OT): 5 times/wk  Plan of Care Review  Plan of Care Reviewed With: patient  Outcome Evaluation: Patient is an 79 year old female admitted to [facility] with stroke-like symptoms, continuing with imaging but per neuro likely caused by hypertensive urgency. Patient reports that he lives alone,  uses a walker within his home, and is independent with ADLs and IADLs. Patient also reports that he has been planning to move in with son, and may do that soon. Patient performed bed mobility with SBA, with verbal cues for hand placement and body mechanics, performed sit to stand and bed to chair transfer with CGA using walker. He was able to ambulate with CGA within hospital room using rwx. Patient reports generalized weakness in LUE and LLE, and headache at 7/10 in left side of head and neck. Patient will benefit from skilled OT during acute hospital stay, to improve generalized deconditioning L UE strength and coordination, and ADL re-training as needed. Recommend dc to home with 24 hour support and HH vs SNF pending progress.     Time Calculation:   Evaluation Complexity (OT)  Review Occupational Profile/Medical/Therapy History Complexity: expanded/moderate complexity  Assessment, Occupational Performance/Identification of Deficit Complexity: 3-5 performance deficits  Clinical Decision Making Complexity (OT): detailed assessment/moderate complexity  Overall Complexity of Evaluation (OT): moderate complexity     Time Calculation- OT       Row Name 11/02/24 1432             Time Calculation- OT    OT Start Time 1330  -      OT Stop Time 1403  -      OT Time Calculation (min) 33 min  -      Total Timed Code Minutes- OT 23 minute(s)  -      OT Non-Billable Time (min) 10 min  -      OT Received On 11/02/24  -      OT - Next Appointment 11/04/24  -      OT Goal Re-Cert Due Date 11/16/24  -         Timed Charges    70527 - OT Therapeutic Activity Minutes 23  -         Untimed Charges    OT Eval/Re-eval Minutes 10  -         Total Minutes    Timed Charges Total Minutes 23  -      Untimed Charges Total Minutes 10  -LH       Total Minutes 33  -LH                User Key  (r) = Recorded By, (t) = Taken By, (c) = Cosigned By      Initials Name Provider Type     Alesha Don, OT Occupational Therapist                   Therapy Charges for Today       Code Description Service Date Service Provider Modifiers Qty    [id number]  OT THERAPEUTIC ACT EA 15 MIN 11/2/2024 Alesha Don, OT GO 2    [id number]  OT EVAL MOD COMPLEXITY 2 11/2/2024 Alesha Don OT GO 1                 Alesha Don OT  11/2/2024

## 2024-11-02 NOTE — PROGRESS NOTES
Name: Bird Gallegos ADMIT: 2024   : 1945  PCP: Ning Robledo MD    MRN: [id number] LOS: 0 days   AGE/SEX: 79 y.o. male  ROOM: [facility]   Subjective   Chief Complaint   Patient presents with    Dizziness     79 year old man with type 2 diabetes, hypertension, hyperlipidemia, coronary artery disease s/p 1v CABG, aortic stenosis s/p bioprosthetic AVR, STEF, history of stroke, who presents following an episode of weakness and lightheadedness, left side more so.     Symptoms a little better today  +CASTELLANO    ROS  No f/c  No n/v  No cp/palp  No soa/cough    Objective   Vital Signs  Temp:  [97.7 °F (36.5 °C)-98.2 °F (36.8 °C)] 98.2 °F (36.8 °C)  Heart Rate:  [59-89] 89  Resp:  [16-20] 18  BP: (156-228)/() 163/74  SpO2:  [79 %-99 %] 96 %  on  Flow (L/min) (Oxygen Therapy):  [2] 2;   Device (Oxygen Therapy): nasal cannula  Body mass index is 40.91 kg/m².    Physical Exam  HENT:      Head: Normocephalic and atraumatic.   Eyes:      General: No scleral icterus.  Cardiovascular:      Rate and Rhythm: Normal rate and regular rhythm.      Heart sounds: Normal heart sounds.   Pulmonary:      Effort: Pulmonary effort is normal. No respiratory distress.      Breath sounds: Normal breath sounds.   Abdominal:      General: There is no distension.      Palpations: Abdomen is soft.      Tenderness: There is no abdominal tenderness.   Musculoskeletal:      Cervical back: Neck supple.   Neurological:      Mental Status: He is alert.   Psychiatric:         Behavior: Behavior normal.         Results Review:       I reviewed the patient's new clinical results.  Results from last 7 days   Lab Units 24  04124  1708   WBC 10*3/mm3 8.23 7.71   HEMOGLOBIN g/dL 14.4 14.2   PLATELETS 10*3/mm3 209 230     Results from last 7 days   Lab Units 24  0419 24  1708   SODIUM mmol/L 139 143   POTASSIUM mmol/L 3.8 4.1   CHLORIDE mmol/L 100 102   CO2 mmol/L 21.4* 29.0   BUN mg/dL 18 15   CREATININE mg/dL 1.09 1.09    GLUCOSE mg/dL 267* 111*   Estimated Creatinine Clearance: 67.5 mL/min (by C-G formula based on SCr of 1.09 mg/dL).  Results from last 7 days   Lab Units 11/02/24  0419 11/01/24  1708   ALBUMIN g/dL 3.9 4.3   BILIRUBIN mg/dL 0.4 0.4   ALK PHOS U/L 64 72   AST (SGOT) U/L 17 23   ALT (SGPT) U/L 16 18     Results from last 7 days   Lab Units 11/02/24  0419 11/01/24  1708   CALCIUM mg/dL 9.5 10.0   ALBUMIN g/dL 3.9 4.3         Coag   Results from last 7 days   Lab Units 11/01/24  1708   INR  1.00   APTT seconds 32.4     HbA1C   Lab Results   Component Value Date    HGBA1C 7.00 (H) 11/02/2024    HGBA1C 7.7 (H) 09/19/2024    HGBA1C 6.8 (H) 06/19/2024     Infection     Radiology(recent) MRI Brain Without Contrast    Result Date: 11/2/2024  There is no convincing evidence of an acute infarct. There is a 4 mm area of increased signal intensity about the cortex of the left frontal lobe anteriorly and medially. This is adjacent to prominent falcine calcification and is likely artifactual. A small cortical infarct could not be entirely excluded but is thought to be less likely. Mild small vessel ischemic disease and mild mucosal thickening involving the sphenoid sinus is noted. There is no evidence of mass or mass effect on this noncontrasted MRI examination the brain.  This report was finalized on 11/2/2024 1:20 PM by Dr. Blaise Suárez M.D on Workstation: [id number]      CT Angiogram Head w AI Analysis of LVO    Result Date: 11/1/2024   No convincing evidence to suggest an acute infarct on the noncontrast head CT or the CT perfusion study. No significant perfusion asymmetries are evident on the Tmax greater than 6 seconds or CBF less than 30% maps.  Approximate 55% stenosis within the proximal portion of the left ICA and 45 to 50% stenosis within the proximal portion of the right ICA by NASCET criteria.  Moderate stenoses within the origins of both vertebral arteries.  Moderate stenosis of the left P3 segment.  The  findings of the noncontrast head CT were discussed with Dr. Hercules on 11/01/2024 at approximately 5:26 p.m. The findings of the CT angiogram and CT perfusion study were discussed with Dr. Hercules at approximately 5:40 p.m.   Radiation dose reduction techniques were utilized, including automated exposure control and exposure modulation based on body size.   This report was finalized on 11/1/2024 8:46 PM by Dr. Max Sánchez M.D on Workstation: [id number]      CT Angiogram Neck    Result Date: 11/1/2024   No convincing evidence to suggest an acute infarct on the noncontrast head CT or the CT perfusion study. No significant perfusion asymmetries are evident on the Tmax greater than 6 seconds or CBF less than 30% maps.  Approximate 55% stenosis within the proximal portion of the left ICA and 45 to 50% stenosis within the proximal portion of the right ICA by NASCET criteria.  Moderate stenoses within the origins of both vertebral arteries.  Moderate stenosis of the left P3 segment.  The findings of the noncontrast head CT were discussed with Dr. Hercules on 11/01/2024 at approximately 5:26 p.m. The findings of the CT angiogram and CT perfusion study were discussed with Dr. Hercules at approximately 5:40 p.m.   Radiation dose reduction techniques were utilized, including automated exposure control and exposure modulation based on body size.   This report was finalized on 11/1/2024 8:46 PM by Dr. Max Sánchez M.D on Workstation: [id number]      CT CEREBRAL PERFUSION WITH & WITHOUT CONTRAST    Result Date: 11/1/2024   No convincing evidence to suggest an acute infarct on the noncontrast head CT or the CT perfusion study. No significant perfusion asymmetries are evident on the Tmax greater than 6 seconds or CBF less than 30% maps.  Approximate 55% stenosis within the proximal portion of the left ICA and 45 to 50% stenosis within the proximal portion of the right ICA by NASCET criteria.  Moderate stenoses within  "the origins of both vertebral arteries.  Moderate stenosis of the left P3 segment.  The findings of the noncontrast head CT were discussed with Dr. Hercules on 11/01/2024 at approximately 5:26 p.m. The findings of the CT angiogram and CT perfusion study were discussed with Dr. Hercules at approximately 5:40 p.m.   Radiation dose reduction techniques were utilized, including automated exposure control and exposure modulation based on body size.   This report was finalized on 11/1/2024 8:46 PM by Dr. Max Sánchez M.D on Workstation: [id number]      XR Chest 1 View    Result Date: 11/1/2024  No focal pulmonary consolidation. Pulmonary vascular congestion.  This report was finalized on 11/1/2024 6:58 PM by Dr. Rich Bansal M.D on Workstation: [id number]     HS Troponin T   Date Value Ref Range Status   11/01/2024 21 <22 ng/L Final     No components found for: \"TSH;2\"    atorvastatin, 40 mg, Oral, Nightly  carvedilol, 25 mg, Oral, Q12H  cetirizine, 10 mg, Oral, Daily  clonazePAM, 2 mg, Oral, Nightly  clopidogrel, 75 mg, Oral, Daily  furosemide, 40 mg, Oral, Daily  gabapentin, 300 mg, Oral, TID  insulin lispro, 2-7 Units, Subcutaneous, 4x Daily AC & at Bedtime  Ketotifen Fumarate, 1 drop, Both Eyes, BID  methylPREDNISolone sodium succinate, 40 mg, Intravenous, Q4H  sertraline, 50 mg, Oral, Daily  tamsulosin, 0.4 mg, Oral, Nightly       Diet: Cardiac, Diabetic; Healthy Heart (2-3 Na+); Consistent Carbohydrate; Fluid Consistency: Thin (IDDSI 0)      Assessment & Plan      Active Hospital Problems    Diagnosis  POA    **Stroke-like symptoms [R29.90]  Yes    History of stroke [Z86.73]  Not Applicable    Type 2 diabetes mellitus, without long-term current use of insulin [E11.9]  Yes    Hypertension [I10]  Yes    Coronary artery disease involving native coronary artery of native heart [I25.10]  Yes    Nonrheumatic aortic valve stenosis [I35.0]  Yes    STEF on auto CPAP [G47.33]  Yes    Class 3 severe obesity due to " excess calories with serious comorbidity and body mass index (BMI) of 40.0 to 44.9 in adult [E66.813, E66.01, Z68.41]  Not Applicable      Resolved Hospital Problems   No resolved problems to display.     79 year old man with type 2 diabetes, hypertension, hyperlipidemia, coronary artery disease s/p 1v CABG, aortic stenosis s/p bioprosthetic AVR, STEF, history of stroke, who presents following an episode of weakness and lightheadedness, left side more so.     Left lower extremity weakness and left sided numbness-stroke vs tia vs hypertension related. Continue statin, plavix. BP control     Hypertensive urgency-bring down BP with HTN agents     Pulmonary vascular congestion/lower extremity edema-lasix, volume status seems ok today      Type 2 diabetes-hold home orals and use sliding scale. BG elevated related to steroids will stop steroids and increase insulin    HA- will give benadryl and compazine and monitor      DW staff  DW Dr. Beena Goldberg MD  [facility]  11/02/24  14:12 EDT

## 2024-11-02 NOTE — PLAN OF CARE
Goal Outcome Evaluation:  Plan of Care Reviewed With: patient           Outcome Evaluation: Patient is an 79 year old female admitted to [facility] with stroke-like symptoms, continuing with imaging but per neuro likely caused by hypertensive urgency. Patient reports that he lives alone, uses a walker within his home, and is independent with ADLs and IADLs. Patient also reports that he has been planning to move in with son, and may do that soon. Patient performed bed mobility with SBA, with verbal cues for hand placement and body mechanics, performed sit to stand and bed to chair transfer with CGA using walker. He was able to ambulate with CGA within hospital room using rwx. Patient reports generalized weakness in LUE and LLE, and headache at 7/10 in left side of head and neck. Patient will benefit from skilled OT during acute hospital stay, to improve generalized deconditioning L UE strength and coordination, and ADL re-training as needed. Recommend dc to home with 24 hour support and HH vs SNF pending progress.    Anticipated Discharge Disposition (OT): home, home with 24/7 care, home with home health, skilled nursing facility

## 2024-11-02 NOTE — PLAN OF CARE
Goal Outcome Evaluation:alert and oriented x4.  Room air.  MRI and echo completed this shift.  NIH completed-2. Denies pain, distress. VSS and call light in reach.

## 2024-11-03 LAB
ANION GAP SERPL CALCULATED.3IONS-SCNC: 10 MMOL/L (ref 5–15)
BUN SERPL-MCNC: 21 MG/DL (ref 8–23)
BUN/CREAT SERPL: 22.3 (ref 7–25)
CALCIUM SPEC-SCNC: 8.4 MG/DL (ref 8.6–10.5)
CHLORIDE SERPL-SCNC: 104 MMOL/L (ref 98–107)
CO2 SERPL-SCNC: 27 MMOL/L (ref 22–29)
CREAT SERPL-MCNC: 0.94 MG/DL (ref 0.76–1.27)
EGFRCR SERPLBLD CKD-EPI 2021: 81.4 ML/MIN/1.73
GLUCOSE BLDC GLUCOMTR-MCNC: 130 MG/DL (ref 70–130)
GLUCOSE BLDC GLUCOMTR-MCNC: 137 MG/DL (ref 70–130)
GLUCOSE BLDC GLUCOMTR-MCNC: 154 MG/DL (ref 70–130)
GLUCOSE BLDC GLUCOMTR-MCNC: 160 MG/DL (ref 70–130)
GLUCOSE SERPL-MCNC: 136 MG/DL (ref 65–99)
MAGNESIUM SERPL-MCNC: 2.4 MG/DL (ref 1.6–2.4)
POTASSIUM SERPL-SCNC: 3.6 MMOL/L (ref 3.5–5.2)
SODIUM SERPL-SCNC: 141 MMOL/L (ref 136–145)
VIT B12 BLD-MCNC: 970 PG/ML (ref 211–946)

## 2024-11-03 PROCEDURE — 83735 ASSAY OF MAGNESIUM: CPT | Performed by: INTERNAL MEDICINE

## 2024-11-03 PROCEDURE — 97116 GAIT TRAINING THERAPY: CPT

## 2024-11-03 PROCEDURE — 97161 PT EVAL LOW COMPLEX 20 MIN: CPT

## 2024-11-03 PROCEDURE — 82948 REAGENT STRIP/BLOOD GLUCOSE: CPT

## 2024-11-03 PROCEDURE — G0378 HOSPITAL OBSERVATION PER HR: HCPCS

## 2024-11-03 PROCEDURE — 80048 BASIC METABOLIC PNL TOTAL CA: CPT | Performed by: INTERNAL MEDICINE

## 2024-11-03 PROCEDURE — 82607 VITAMIN B-12: CPT | Performed by: INTERNAL MEDICINE

## 2024-11-03 PROCEDURE — 63710000001 INSULIN LISPRO (HUMAN) PER 5 UNITS: Performed by: STUDENT IN AN ORGANIZED HEALTH CARE EDUCATION/TRAINING PROGRAM

## 2024-11-03 RX ORDER — ISOSORBIDE MONONITRATE 30 MG/1
30 TABLET, EXTENDED RELEASE ORAL
Status: DISCONTINUED | OUTPATIENT
Start: 2024-11-03 | End: 2024-11-04 | Stop reason: HOSPADM

## 2024-11-03 RX ORDER — LISINOPRIL 20 MG/1
20 TABLET ORAL
Status: DISCONTINUED | OUTPATIENT
Start: 2024-11-03 | End: 2024-11-04 | Stop reason: HOSPADM

## 2024-11-03 RX ADMIN — SERTRALINE 50 MG: 50 TABLET, FILM COATED ORAL at 09:15

## 2024-11-03 RX ADMIN — CLOPIDOGREL BISULFATE 75 MG: 75 TABLET, FILM COATED ORAL at 09:17

## 2024-11-03 RX ADMIN — ATORVASTATIN CALCIUM 40 MG: 20 TABLET, FILM COATED ORAL at 22:56

## 2024-11-03 RX ADMIN — GABAPENTIN 300 MG: 300 CAPSULE ORAL at 09:17

## 2024-11-03 RX ADMIN — CETIRIZINE HYDROCHLORIDE 10 MG: 10 TABLET ORAL at 09:14

## 2024-11-03 RX ADMIN — TAMSULOSIN HYDROCHLORIDE 0.4 MG: 0.4 CAPSULE ORAL at 22:56

## 2024-11-03 RX ADMIN — CLONAZEPAM 2 MG: 0.5 TABLET ORAL at 22:58

## 2024-11-03 RX ADMIN — GABAPENTIN 300 MG: 300 CAPSULE ORAL at 22:57

## 2024-11-03 RX ADMIN — CARVEDILOL 25 MG: 25 TABLET, FILM COATED ORAL at 09:17

## 2024-11-03 RX ADMIN — GABAPENTIN 300 MG: 300 CAPSULE ORAL at 16:40

## 2024-11-03 RX ADMIN — CARVEDILOL 25 MG: 25 TABLET, FILM COATED ORAL at 22:55

## 2024-11-03 RX ADMIN — FUROSEMIDE 40 MG: 40 TABLET ORAL at 09:17

## 2024-11-03 RX ADMIN — ISOSORBIDE MONONITRATE 30 MG: 30 TABLET, EXTENDED RELEASE ORAL at 09:14

## 2024-11-03 RX ADMIN — KETOTIFEN FUMARATE 1 DROP: 0.25 SOLUTION OPHTHALMIC at 09:18

## 2024-11-03 RX ADMIN — INSULIN LISPRO 2 UNITS: 100 INJECTION, SOLUTION INTRAVENOUS; SUBCUTANEOUS at 10:37

## 2024-11-03 RX ADMIN — LISINOPRIL 20 MG: 20 TABLET ORAL at 09:17

## 2024-11-03 RX ADMIN — KETOTIFEN FUMARATE 1 DROP: 0.25 SOLUTION OPHTHALMIC at 23:00

## 2024-11-03 RX ADMIN — INSULIN LISPRO 2 UNITS: 100 INJECTION, SOLUTION INTRAVENOUS; SUBCUTANEOUS at 16:40

## 2024-11-03 RX ADMIN — AMLODIPINE BESYLATE 10 MG: 10 TABLET ORAL at 09:15

## 2024-11-03 NOTE — THERAPY EVALUATION
Patient Name: Bird Gallegos  : 1945    MRN: [id number]                              Today's Date: 11/3/2024       Admit Date: 2024    Visit Dx:     ICD-10-CM ICD-9-CM   1. Stroke-like symptoms  R29.90 781.99   2. Hypertension, unspecified type  I10 401.9   3. Hyperglycemia  R73.9 790.29   4. History of CAD (coronary artery disease)  Z86.79 V12.59     Patient Active Problem List   Diagnosis    STEF on auto CPAP    Class 3 severe obesity due to excess calories with serious comorbidity and body mass index (BMI) of 40.0 to 44.9 in adult    Nonrheumatic aortic valve stenosis    Aortic valve stenosis    Coronary artery disease involving native coronary artery of native heart    Paresthesias    Diabetic neuropathy    Type 2 diabetes mellitus, without long-term current use of insulin    Hypertension    Thalamic stroke    Stroke-like symptoms    History of stroke     Past Medical History:   Diagnosis Date    Anxiety and depression     Aortic stenosis     Arthritis     Coronary artery disease     CTS (carpal tunnel syndrome)     Degenerative joint disease (DJD) of lumbar spine     Diabetes mellitus     Diabetic neuropathy     Difficulty walking     Diverticulosis     COLVIN (dyspnea on exertion)     GERD (gastroesophageal reflux disease)     Headache, tension-type     Heart murmur     History of cellulitis     LEFT LEG    HL (hearing loss)     Hypertension     STEF (obstructive sleep apnea)     STEF on auto CPAP     Stroke     Vision loss      Past Surgical History:   Procedure Laterality Date    BACK SURGERY      X2    CARDIAC CATHETERIZATION N/A 3/21/2019    Procedure: Coronary angiography;  Surgeon: Wendy Braxton MD;  Location: [facility] INVASIVE LOCATION;  Service: Cardiology    CORONARY ARTERY BYPASS GRAFT WITH AORTIC VALVE REPAIR/REPLACEMENT N/A 3/26/2019    Procedure: INTRAOP QUIQUE; CORONARY ARTERY BYPASS X 1 UTILIZING ENDOSCOPICALLY HARVESTED RIGHT GREATER SAPHENOUS VEIN; AORTIC VALVE REPLACEMENT AND PRP.;   Surgeon: Alexx Gandara MD;  Location: [facility] MAIN OR;  Service: Cardiothoracic    POSTERIOR CERVICAL FUSION      TENDON REPAIR Left     KNEE    WRIST SURGERY Bilateral       General Information       Row Name 11/03/24 1001          Physical Therapy Time and Intention    Document Type evaluation  -CN     Mode of Treatment individual therapy;physical therapy  -CN       Row Name 11/03/24 1001          General Information    Patient Profile Reviewed yes  -CN     Prior Level of Function independent:;all household mobility;ADL's  -CN     Existing Precautions/Restrictions fall  -CN       Row Name 11/03/24 1001          Living Environment    People in Home alone  Plans to have family assist upon return home  -CN       Row Name 11/03/24 1001          Home Main Entrance    Number of Stairs, Main Entrance one  -CN       Row Name 11/03/24 1001          Stairs Within Home, Primary    Number of Stairs, Within Home, Primary none  -CN       Row Name 11/03/24 1001          Cognition    Orientation Status (Cognition) oriented x 4  -CN       Row Name 11/03/24 1001          Safety Issues/Impairments Affecting Functional Mobility    Impairments Affecting Function (Mobility) balance;endurance/activity tolerance;pain  -CN               User Key  (r) = Recorded By, (t) = Taken By, (c) = Cosigned By      Initials Name Provider Type    CN Tracy Hdez, PT Physical Therapist                   Mobility       Row Name 11/03/24 1002          Bed Mobility    Bed Mobility bed mobility (all) activities  -CN     All Activities, [place] (Bed Mobility) standby assist;verbal cues  -CN       Row Name 11/03/24 1002          Sit-Stand Transfer    Sit-Stand [place] (Transfers) contact guard;verbal cues  -CN     Assistive Device (Sit-Stand Transfers) walker, front-wheeled  -CN     Comment, (Sit-Stand Transfer) cues for hand placement  -CN       Row Name 11/03/24 1002          Gait/Stairs (Locomotion)    [place] Level (Gait)  contact guard  -CN     Assistive Device (Gait) walker, front-wheeled  -CN     Distance in Feet (Gait) 100  -CN     Deviations/Abnormal Patterns (Gait) stride length decreased;weight shifting decreased;gait speed decreased  -CN     Bilateral Gait Deviations forward flexed posture;heel strike decreased  -CN     Left Sided Gait Deviations weight shift ability decreased;Trendelenburg sign  -CN     Comment, (Gait/Stairs) cues for erect posture, safety with turns  -CN               User Key  (r) = Recorded By, (t) = Taken By, (c) = Cosigned By      Initials Name Provider Type    CN Tracy Hdez, PT Physical Therapist                   Obj/Interventions       Row Name 11/03/24 1003          Range of Motion Comprehensive    General Range of Motion no range of motion deficits identified  -CN       Row Name 11/03/24 1003          Strength Comprehensive (MMT)    General Manual Muscle Testing (MMT) Assessment lower extremity strength deficits identified  -CN     Comment, General Manual Muscle Testing (MMT) Assessment Functional LE strength rated 4/5 B  -CN               User Key  (r) = Recorded By, (t) = Taken By, (c) = Cosigned By      Initials Name Provider Type    Tracy Trujillo, PT Physical Therapist                   Goals/Plan       Row Name 11/03/24 1007          Bed Mobility Goal 1 (PT)    Activity/Assistive Device (Bed Mobility Goal 1, PT) bed mobility activities, all  -CN     [place] Level/Cues Needed (Bed Mobility Goal 1, PT) modified independence  -CN     Time Frame (Bed Mobility Goal 1, PT) 1 week  -CN       Row Name 11/03/24 1007          Transfer Goal 1 (PT)    Activity/Assistive Device (Transfer Goal 1, PT) transfers, all  -CN     [place] Level/Cues Needed (Transfer Goal 1, PT) modified independence  -CN     Time Frame (Transfer Goal 1, PT) 1 week  -CN       Row Name 11/03/24 1007          Gait Training Goal 1 (PT)    Activity/Assistive Device (Gait Training Goal 1, PT) gait  (walking locomotion);assistive device use  -CN     [place] Level (Gait Training Goal 1, PT) standby assist  -CN     Distance (Gait Training Goal 1, PT) 200  -CN     Time Frame (Gait Training Goal 1, PT) 1 week  -CN       Row Name 11/03/24 1007          Therapy Assessment/Plan (PT)    Planned Therapy Interventions (PT) balance training;bed mobility training;gait training;home exercise program;patient/family education;postural re-education;strengthening;stair training;transfer training;neuromuscular re-education  -CN               User Key  (r) = Recorded By, (t) = Taken By, (c) = Cosigned By      Initials Name Provider Type    CN Tracy Hdez, PT Physical Therapist                   Clinical Impression       Row Name 11/03/24 1004          Pain    Pretreatment Pain Rating 3/10  -CN     Posttreatment Pain Rating 3/10  -CN       Row Name 11/03/24 1004          Plan of Care Review    Plan of Care Reviewed With patient  -CN     Outcome Evaluation Pt admitted to [facility] with history of HTN, hyperlipidemia, CAD, aortic stenosis and symptoms consistent with CVA. Pt reporting improvement in symptoms since admission and states he was previously I with ADLs and use of rollator at home. Pt with 1 LISBETH and lives alone, however has family that will be with him upon return home. Pt requires SBA for bed mobility and CGA for t/f and gait. Pt ambulates 100' with Rwx and intermittent cues for safety. Pt would benefit from skilled PT to address current deficits and improve independence with mobility to return to [facility].  -CN       Row Name 11/03/24 1004          Therapy Assessment/Plan (PT)    Rehab Potential (PT) good  -CN     Criteria for Skilled Interventions Met (PT) yes  -CN     Therapy Frequency (PT) 5 times/wk  -CN       Row Name 11/03/24 1004          Positioning and Restraints    Pre-Treatment Position in bed  -CN     Post Treatment Position chair  -CN     In Chair reclined;exit alarm on;with  family/caregiver;encouraged to call for assist  -CN               User Key  (r) = Recorded By, (t) = Taken By, (c) = Cosigned By      Initials Name Provider Type    Tracy Trujillo, SALLY Physical Therapist                   Outcome Measures       Row Name 11/03/24 1008          How much help from another person do you currently need...    Turning from your back to your side while in flat bed without using bedrails? 4  -CN     Moving from lying on back to sitting on the side of a flat bed without bedrails? 4  -CN     Moving to and from a bed to a chair (including a wheelchair)? 3  -CN     Standing up from a chair using your arms (e.g., wheelchair, bedside chair)? 3  -CN     Climbing 3-5 steps with a railing? 2  -CN     To walk in hospital room? 3  -CN     AM-PAC 6 Clicks Score (PT) 19  -CN     Highest Level of Mobility Goal 6 --> Walk 10 steps or more  -CN       Row Name 11/03/24 1008          Functional Assessment    Outcome Measure Options AM-PAC 6 Clicks Basic Mobility (PT)  -CN               User Key  (r) = Recorded By, (t) = Taken By, (c) = Cosigned By      Initials Name Provider Type    Tracy Trujillo, SALLY Physical Therapist                                 Physical Therapy Education       Title: PT OT SLP Therapies (Done)       Topic: Physical Therapy (Done)       Point: Mobility training (Done)       Learning Progress Summary            Patient Acceptance, E, VU,NR by CN at 11/3/2024 1008                      Point: Home exercise program (Done)       Learning Progress Summary            Patient Acceptance, E, VU,NR by CN at 11/3/2024 1008                      Point: Body mechanics (Done)       Learning Progress Summary            Patient Acceptance, E, VU,NR by CN at 11/3/2024 1008                      Point: Precautions (Done)       Learning Progress Summary            Patient Acceptance, E, VU,NR by CN at 11/3/2024 1008                                      User Key       Initials  Effective Dates Name Provider Type Discipline     06/16/21 -  Tracy Hdez PT Physical Therapist PT                  PT Recommendation and Plan  Planned Therapy Interventions (PT): balance training, bed mobility training, gait training, home exercise program, patient/family education, postural re-education, strengthening, stair training, transfer training, neuromuscular re-education  Outcome Evaluation: Pt admitted to [facility] with history of HTN, hyperlipidemia, CAD, aortic stenosis and symptoms consistent with CVA. Pt reporting improvement in symptoms since admission and states he was previously I with ADLs and use of rollator at home. Pt with 1 LISBETH and lives alone, however has family that will be with him upon return home. Pt requires SBA for bed mobility and CGA for t/f and gait. Pt ambulates 100' with Rwx and intermittent cues for safety. Pt would benefit from skilled PT to address current deficits and improve independence with mobility to return to [facility].     Time Calculation:         PT Charges       Row Name 11/03/24 1008             Time Calculation    Start Time 0940  -CN      Stop Time 0951  -CN      Time Calculation (min) 11 min  -CN      PT Received On 11/03/24  -CN      PT - Next Appointment 11/04/24  -CN         Timed Charges    85077 - Gait Training Minutes  9  -CN         Total Minutes    Timed Charges Total Minutes 9  -CN       Total Minutes 9  -CN                User Key  (r) = Recorded By, (t) = Taken By, (c) = Cosigned By      Initials Name Provider Type    Tracy Trujillo PT Physical Therapist                  Therapy Charges for Today       Code Description Service Date Service Provider Modifiers Qty    66316535623 HC GAIT TRAINING EA 15 MIN 11/3/2024 Tracy Hdez, PT GP 1    13579218886 HC PT EVAL LOW COMPLEXITY 1 11/3/2024 Tracy Hdez, PT GP 1            PT G-Codes  Outcome Measure Options: AM-PAC 6 Clicks Basic Mobility (PT)  AM-PAC 6 Clicks Score  (PT): 19  AM-PAC 6 Clicks Score (OT): 22  PT Discharge Summary  Anticipated Discharge Disposition (PT): home with assist, home with home health    Tracy Hdez, PT  11/3/2024

## 2024-11-03 NOTE — PLAN OF CARE
No new acute neurological issues this shift, pt AAO x4, NIH of three (3) with minimal deficits.  Pt continues to present with sleep apnea this shift in spite of 2 to 4 liters of O2, sats frequently fluctuating between 80s and 90s.  Pt resting well in bed, no further acute issues, will continue to monitor and observe.     Goal Outcome Evaluation:  Plan of Care Reviewed With: patient        Progress: no change      Problem: Adult Inpatient Plan of Care  Goal: Plan of Care Review  Flowsheets (Taken 11/3/2024 0813)  Progress: no change  Plan of Care Reviewed With: patient     Problem: Stroke, Ischemic (Includes Transient Ischemic Attack)  Goal: Optimal Coping  Outcome: Progressing     Problem: Stroke, Ischemic (Includes Transient Ischemic Attack)  Goal: Optimal Cognitive Function  Outcome: Progressing     Problem: Stroke, Ischemic (Includes Transient Ischemic Attack)  Goal: Improved Communication Skills  Outcome: Progressing     Problem: Stroke, Ischemic (Includes Transient Ischemic Attack)  Goal: Effective Oxygenation and Ventilation  Outcome: Not Progressing     Problem: Comorbidity Management  Goal: Blood Glucose Level Within Target Range  Outcome: Progressing     Problem: Fall Injury Risk  Goal: Absence of Fall and Fall-Related Injury  Outcome: Progressing

## 2024-11-03 NOTE — PLAN OF CARE
Goal Outcome Evaluation:  Plan of Care Reviewed With: patient           Outcome Evaluation: Pt admitted to [facility] with history of HTN, hyperlipidemia, CAD, aortic stenosis and symptoms consistent with CVA. Pt reporting improvement in symptoms since admission and states he was previously I with ADLs and use of rollator at home. Pt with 1 [place] and lives alone, however has family that will be with him upon return home. Pt requires SBA for bed mobility and CGA for t/f and gait. Pt ambulates 100' with Rwx and intermittent cues for safety. Pt would benefit from skilled PT to address current deficits and improve independence with mobility to return to [facility].    Anticipated Discharge Disposition (PT): home with assist, home with home health                         [TextBox_4] : 1 y/o M presents for follow up of urinary problems. Hx obtained from mom. Mother reported patient was taking Miralax for about 1 week, then stopped completely since urinary frequency symptom resolved. Patient had been well until about 2 weeks ago, when patient experienced urinary frequency, urinary urgency, but no urinary incontinence, hematuria, or dysuria. He does not have any bedwetting\par \par [place] stool 4, goes daily, no straining\par \par Denies fevers, chills

## 2024-11-03 NOTE — PROGRESS NOTES
Name: Bird Gallegos ADMIT: 2024   : 1945  PCP: Ning Robledo MD    MRN: [id number] LOS: 0 days   AGE/SEX: 79 y.o. male  ROOM: [facility]   Subjective   Chief Complaint   Patient presents with    Dizziness     79 year old man with type 2 diabetes, hypertension, hyperlipidemia, coronary artery disease s/p 1v CABG, aortic stenosis s/p bioprosthetic AVR, STEF, history of stroke, who presents following an episode of weakness and lightheadedness, left side more so.     Symptoms a little better, feels at baseline  +HA is better  Had MRI  Working with therapy    ROS  No f/c  No n/v  No cp/palp  No soa/cough    Objective   Vital Signs  Temp:  [97.3 °F (36.3 °C)-98.2 °F (36.8 °C)] 97.3 °F (36.3 °C)  Heart Rate:  [61-89] 61  Resp:  [18] 18  BP: (122-167)/(69-84) 122/71  SpO2:  [91 %-100 %] 95 %  on  Flow (L/min) (Oxygen Therapy):  [2-4] 4;   Device (Oxygen Therapy): room air  Body mass index is 40.91 kg/m².    Physical Exam  HENT:      Head: Normocephalic and atraumatic.   Eyes:      General: No scleral icterus.  Cardiovascular:      Rate and Rhythm: Normal rate and regular rhythm.      Heart sounds: Normal heart sounds.   Pulmonary:      Effort: Pulmonary effort is normal. No respiratory distress.      Breath sounds: Normal breath sounds.   Abdominal:      General: There is no distension.      Palpations: Abdomen is soft.      Tenderness: There is no abdominal tenderness.   Musculoskeletal:      Cervical back: Neck supple.   Neurological:      Mental Status: He is alert.   Psychiatric:         Behavior: Behavior normal.         Results Review:       I reviewed the patient's new clinical results.  Results from last 7 days   Lab Units 24  04124  1708   WBC 10*3/mm3 8.23 7.71   HEMOGLOBIN g/dL 14.4 14.2   PLATELETS 10*3/mm3 209 230     Results from last 7 days   Lab Units 24  0456 24  04124  1708   SODIUM mmol/L 141 139 143   POTASSIUM mmol/L 3.6 3.8 4.1   CHLORIDE mmol/L 104 100 102    CO2 mmol/L 27.0 21.4* 29.0   BUN mg/dL 21 18 15   CREATININE mg/dL 0.94 1.09 1.09   GLUCOSE mg/dL 136* 267* 111*   Estimated Creatinine Clearance: 78.3 mL/min (by C-G formula based on SCr of 0.94 mg/dL).  Results from last 7 days   Lab Units 11/02/24  0419 11/01/24  1708   ALBUMIN g/dL 3.9 4.3   BILIRUBIN mg/dL 0.4 0.4   ALK PHOS U/L 64 72   AST (SGOT) U/L 17 23   ALT (SGPT) U/L 16 18     Results from last 7 days   Lab Units 11/03/24  0456 11/02/24  0419 11/01/24  1708   CALCIUM mg/dL 8.4* 9.5 10.0   ALBUMIN g/dL  --  3.9 4.3   MAGNESIUM mg/dL 2.4  --   --          Coag   Results from last 7 days   Lab Units 11/01/24  1708   INR  1.00   APTT seconds 32.4     HbA1C   Lab Results   Component Value Date    HGBA1C 7.00 (H) 11/02/2024    HGBA1C 7.7 (H) 09/19/2024    HGBA1C 6.8 (H) 06/19/2024     Infection     Radiology(recent) MRI Brain Without Contrast    Result Date: 11/2/2024  There is no convincing evidence of an acute infarct. There is a 4 mm area of increased signal intensity about the cortex of the left frontal lobe anteriorly and medially. This is adjacent to prominent falcine calcification and is likely artifactual. A small cortical infarct could not be entirely excluded but is thought to be less likely. Mild small vessel ischemic disease and mild mucosal thickening involving the sphenoid sinus is noted. There is no evidence of mass or mass effect on this noncontrasted MRI examination the brain.  This report was finalized on 11/2/2024 1:20 PM by Dr. Blaise Suárez M.D on Workstation: [id number]      CT Angiogram Head w AI Analysis of LVO    Result Date: 11/1/2024   No convincing evidence to suggest an acute infarct on the noncontrast head CT or the CT perfusion study. No significant perfusion asymmetries are evident on the Tmax greater than 6 seconds or CBF less than 30% maps.  Approximate 55% stenosis within the proximal portion of the left ICA and 45 to 50% stenosis within the proximal portion of the right  ICA by NASCET criteria.  Moderate stenoses within the origins of both vertebral arteries.  Moderate stenosis of the left P3 segment.  The findings of the noncontrast head CT were discussed with Dr. Hercules on 11/01/2024 at approximately 5:26 p.m. The findings of the CT angiogram and CT perfusion study were discussed with Dr. Hercules at approximately 5:40 p.m.   Radiation dose reduction techniques were utilized, including automated exposure control and exposure modulation based on body size.   This report was finalized on 11/1/2024 8:46 PM by Dr. Max Sánchez M.D on Workstation: [id number]      CT Angiogram Neck    Result Date: 11/1/2024   No convincing evidence to suggest an acute infarct on the noncontrast head CT or the CT perfusion study. No significant perfusion asymmetries are evident on the Tmax greater than 6 seconds or CBF less than 30% maps.  Approximate 55% stenosis within the proximal portion of the left ICA and 45 to 50% stenosis within the proximal portion of the right ICA by NASCET criteria.  Moderate stenoses within the origins of both vertebral arteries.  Moderate stenosis of the left P3 segment.  The findings of the noncontrast head CT were discussed with Dr. Hercules on 11/01/2024 at approximately 5:26 p.m. The findings of the CT angiogram and CT perfusion study were discussed with Dr. Hercules at approximately 5:40 p.m.   Radiation dose reduction techniques were utilized, including automated exposure control and exposure modulation based on body size.   This report was finalized on 11/1/2024 8:46 PM by Dr. Max Sánchez M.D on Workstation: [id number]      CT CEREBRAL PERFUSION WITH & WITHOUT CONTRAST    Result Date: 11/1/2024   No convincing evidence to suggest an acute infarct on the noncontrast head CT or the CT perfusion study. No significant perfusion asymmetries are evident on the Tmax greater than 6 seconds or CBF less than 30% maps.  Approximate 55% stenosis within the proximal  "portion of the left ICA and 45 to 50% stenosis within the proximal portion of the right ICA by NASCET criteria.  Moderate stenoses within the origins of both vertebral arteries.  Moderate stenosis of the left P3 segment.  The findings of the noncontrast head CT were discussed with Dr. Hercules on 11/01/2024 at approximately 5:26 p.m. The findings of the CT angiogram and CT perfusion study were discussed with Dr. Hercules at approximately 5:40 p.m.   Radiation dose reduction techniques were utilized, including automated exposure control and exposure modulation based on body size.   This report was finalized on 11/1/2024 8:46 PM by Dr. Max Sánchez M.D on Workstation: [id number]      XR Chest 1 View    Result Date: 11/1/2024  No focal pulmonary consolidation. Pulmonary vascular congestion.  This report was finalized on 11/1/2024 6:58 PM by Dr. Rich Bansal M.D on Workstation: [id number]     HS Troponin T   Date Value Ref Range Status   11/01/2024 21 <22 ng/L Final     No components found for: \"TSH;2\"    amLODIPine, 10 mg, Oral, Q24H  atorvastatin, 40 mg, Oral, Nightly  carvedilol, 25 mg, Oral, Q12H  cetirizine, 10 mg, Oral, Daily  clonazePAM, 2 mg, Oral, Nightly  clopidogrel, 75 mg, Oral, Daily  furosemide, 40 mg, Oral, Daily  gabapentin, 300 mg, Oral, TID  insulin lispro, 2-7 Units, Subcutaneous, 4x Daily AC & at Bedtime  isosorbide mononitrate, 30 mg, Oral, Q24H  Ketotifen Fumarate, 1 drop, Both Eyes, BID  lisinopril, 20 mg, Oral, Q24H  sertraline, 50 mg, Oral, Daily  tamsulosin, 0.4 mg, Oral, Nightly       Diet: Cardiac, Diabetic; Healthy Heart (2-3 Na+); Consistent Carbohydrate; Fluid Consistency: Thin (IDDSI 0)      Assessment & Plan      Active Hospital Problems    Diagnosis  POA    **Stroke-like symptoms [R29.90]  Yes    History of stroke [Z86.73]  Not Applicable    Type 2 diabetes mellitus, without long-term current use of insulin [E11.9]  Yes    Hypertension [I10]  Yes    Coronary artery disease " involving native coronary artery of native heart [I25.10]  Yes    Nonrheumatic aortic valve stenosis [I35.0]  Yes    STEF on auto CPAP [G47.33]  Yes    Class 3 severe obesity due to excess calories with serious comorbidity and body mass index (BMI) of 40.0 to 44.9 in adult [E66.813, E66.01, Z68.41]  Not Applicable      Resolved Hospital Problems   No resolved problems to display.     79 year old man with type 2 diabetes, hypertension, hyperlipidemia, coronary artery disease s/p 1v CABG, aortic stenosis s/p bioprosthetic AVR, STEF, history of stroke, who presents following an episode of weakness and lightheadedness, left side more so.     Left lower extremity weakness and left sided numbness-stroke vs tia vs hypertension related. Continue statin, plavix. BP control     Hypertensive urgency-bring down BP with HTN agents, improving.      Pulmonary vascular congestion/lower extremity edema-lasix, volume status seems ok today      Type 2 diabetes-hold home orals and use sliding scale. BG improving.     HA- improved      DW staff  Dispo- likely dc to home on 11/4      Red Goldberg MD  [facility]  11/03/24  14:12 EDT

## 2024-11-04 ENCOUNTER — DOCUMENTATION (OUTPATIENT)
Dept: HOME HEALTH SERVICES | Facility: HOME HEALTHCARE | Age: 79
End: 2024-11-04
Payer: MEDICARE

## 2024-11-04 ENCOUNTER — HOME HEALTH ADMISSION (OUTPATIENT)
Dept: HOME HEALTH SERVICES | Facility: HOME HEALTHCARE | Age: 79
End: 2024-11-04
Payer: MEDICARE

## 2024-11-04 VITALS
HEART RATE: 65 BPM | OXYGEN SATURATION: 96 % | WEIGHT: 268.96 LBS | HEIGHT: 68 IN | BODY MASS INDEX: 40.76 KG/M2 | DIASTOLIC BLOOD PRESSURE: 60 MMHG | RESPIRATION RATE: 20 BRPM | TEMPERATURE: 98.4 F | SYSTOLIC BLOOD PRESSURE: 103 MMHG

## 2024-11-04 PROBLEM — I16.0 HYPERTENSIVE URGENCY: Status: ACTIVE | Noted: 2024-11-04

## 2024-11-04 LAB
GLUCOSE BLDC GLUCOMTR-MCNC: 161 MG/DL (ref 70–130)
GLUCOSE BLDC GLUCOMTR-MCNC: 170 MG/DL (ref 70–130)

## 2024-11-04 PROCEDURE — G0378 HOSPITAL OBSERVATION PER HR: HCPCS

## 2024-11-04 PROCEDURE — 82948 REAGENT STRIP/BLOOD GLUCOSE: CPT

## 2024-11-04 PROCEDURE — 63710000001 INSULIN LISPRO (HUMAN) PER 5 UNITS: Performed by: STUDENT IN AN ORGANIZED HEALTH CARE EDUCATION/TRAINING PROGRAM

## 2024-11-04 RX ORDER — FUROSEMIDE 40 MG/1
40 TABLET ORAL DAILY
Start: 2024-11-04

## 2024-11-04 RX ORDER — AMLODIPINE BESYLATE 10 MG/1
10 TABLET ORAL
Qty: 30 TABLET | Refills: 0 | Status: SHIPPED | OUTPATIENT
Start: 2024-11-04 | End: 2024-11-04

## 2024-11-04 RX ORDER — AMLODIPINE BESYLATE 10 MG/1
10 TABLET ORAL
Qty: 30 TABLET | Refills: 0 | Status: SHIPPED | OUTPATIENT
Start: 2024-11-04

## 2024-11-04 RX ADMIN — INSULIN LISPRO 2 UNITS: 100 INJECTION, SOLUTION INTRAVENOUS; SUBCUTANEOUS at 12:59

## 2024-11-04 RX ADMIN — LISINOPRIL 20 MG: 20 TABLET ORAL at 08:52

## 2024-11-04 RX ADMIN — INSULIN LISPRO 2 UNITS: 100 INJECTION, SOLUTION INTRAVENOUS; SUBCUTANEOUS at 08:53

## 2024-11-04 RX ADMIN — CLOPIDOGREL BISULFATE 75 MG: 75 TABLET, FILM COATED ORAL at 08:53

## 2024-11-04 RX ADMIN — SERTRALINE 50 MG: 50 TABLET, FILM COATED ORAL at 08:52

## 2024-11-04 RX ADMIN — FUROSEMIDE 40 MG: 40 TABLET ORAL at 08:52

## 2024-11-04 RX ADMIN — AMLODIPINE BESYLATE 10 MG: 10 TABLET ORAL at 08:52

## 2024-11-04 RX ADMIN — ISOSORBIDE MONONITRATE 30 MG: 30 TABLET, EXTENDED RELEASE ORAL at 08:56

## 2024-11-04 RX ADMIN — GABAPENTIN 300 MG: 300 CAPSULE ORAL at 16:38

## 2024-11-04 RX ADMIN — CETIRIZINE HYDROCHLORIDE 10 MG: 10 TABLET ORAL at 08:52

## 2024-11-04 RX ADMIN — CARVEDILOL 25 MG: 25 TABLET, FILM COATED ORAL at 08:53

## 2024-11-04 RX ADMIN — GABAPENTIN 300 MG: 300 CAPSULE ORAL at 08:52

## 2024-11-04 RX ADMIN — KETOTIFEN FUMARATE 1 DROP: 0.25 SOLUTION OPHTHALMIC at 08:54

## 2024-11-04 NOTE — PLAN OF CARE
Goal Outcome Evaluation:              Outcome Evaluation: MRI negative for acute infarct. SLP to sign off.

## 2024-11-04 NOTE — CASE MANAGEMENT/SOCIAL WORK
Case Management Discharge Note      Final Note: Home with [facility] per private auto. Rolling walker delivered per [place]    Provided Post Acute Provider List?: Yes  Post Acute Provider List: Home Health  Provided Post Acute Provider Quality & Resource List?: Yes  Post Acute Provider Quality and Resource List: Home Health  Delivered To: Patient  Method of Delivery: In person    Selected Continued Care - Discharged on 11/4/2024 Admission date: 11/1/2024 - Discharge disposition: Home or Self Care      Destination    No services have been selected for the patient.                Durable Medical Equipment       Service Provider Services Address Phone Fax Patient Preferred    [facility] [address], [facility] [address] [phone] --              Dialysis/Infusion    No services have been selected for the patient.                Home Medical Care       Service Provider Services Address Phone Fax Patient Preferred     [facility] [address] [place] [address] [id number] [phone] --              Therapy    No services have been selected for the patient.                Community Resources    No services have been selected for the patient.                Community & DME    No services have been selected for the patient.                    Transportation Services  Private: Car    Final Discharge Disposition Code: 06 - home with home health care

## 2024-11-04 NOTE — PLAN OF CARE
No new acute issues this shift, pt AAO x4, NIH of one (1) with minimal deficits and change from previous ones.  Pt on 4 liters O2 at night due to sleep apnea, pt had fewer episodes of desatting this shift.  Pt resting well in bed, no further acute issues, will continue to monitor and observe.     Goal Outcome Evaluation:  Plan of Care Reviewed With: patient        Progress: improving      Problem: Adult Inpatient Plan of Care  Goal: Plan of Care Review  Flowsheets (Taken 11/4/2024 7330)  Progress: improving  Plan of Care Reviewed With: patient     Problem: Stroke, Ischemic (Includes Transient Ischemic Attack)  Goal: Optimal Coping  Outcome: Progressing     Problem: Stroke, Ischemic (Includes Transient Ischemic Attack)  Goal: Improved Communication Skills  Outcome: Progressing     Problem: Stroke, Ischemic (Includes Transient Ischemic Attack)  Goal: Optimal Functional Ability  Outcome: Progressing     Problem: Stroke, Ischemic (Includes Transient Ischemic Attack)  Goal: Effective Oxygenation and Ventilation  Outcome: Progressing     Problem: Stroke, Ischemic (Includes Transient Ischemic Attack)  Goal: Improved Sensorimotor Function  Outcome: Progressing     Problem: Stroke, Ischemic (Includes Transient Ischemic Attack)  Goal: Safe and Effective Swallow  Outcome: Progressing     Problem: Comorbidity Management  Goal: Blood Pressure in Desired Range  Outcome: Progressing     Problem: Fall Injury Risk  Goal: Absence of Fall and Fall-Related Injury  Outcome: Progressing

## 2024-11-04 NOTE — DISCHARGE PLACEMENT REQUEST
"Francie Gallegos (79 y.o. Male)       Date of Birth   1945    Social Security Number       Address   [address] [place], [place] [address]    Home Phone   [phone]    MRN   [id number]       Oriental orthodox   Jain    Marital Status                               Admission Date   11/1/24    Admission Type   Emergency    Admitting Provider   Jasbir Castañeda MD    Attending Provider   Red Goldberg MD    Department, Room/Bed   [address] [place] [address], P591/1       Discharge Date       Discharge Disposition   Home or Self Care    Discharge Destination                                 Attending Provider: Red Goldberg MD    Allergies: Contrast Dye (Echo Or Unknown Ct/mr), Iodine, Latex    Isolation: None   Infection: None   Code Status: CPR    Ht: 172.7 cm (67.99\")   Wt: 122 kg (268 lb 15.4 oz)    Admission Cmt: None   Principal Problem: Hypertensive urgency [I16.0]                   Active Insurance as of 11/1/2024       Primary Coverage       Payor Plan Insurance Group Employer/Plan Group    MEDICARE MEDICARE A & B        Payor Plan Address Payor Plan Phone Number Payor Plan Fax Number Effective Dates    [address] [phone]  4/1/2008 - None Entered    [facility] [id number]         Subscriber Name Subscriber Birth Date Member ID       FRANCIE GALLEGOS 1945 [id number]               Secondary Coverage       Payor Plan Insurance Group Employer/Plan Group    [facility] SUPP [id number]       Payor Plan Address Payor Plan Phone Number Payor Plan Fax Number Effective Dates    [address]   4/1/2008 - None Entered    [facility] [id number]         Subscriber Name Subscriber Birth Date Member ID       FRANCIE GALLEGOS 1945 [id number]                     Emergency Contacts        (Rel.) Home Phone Work Phone Mobile Phone    SANTOS GALLEGOS (Daughter) [phone] -- [phone]    Francie Gallegos (Son) [phone] -- [phone]                "

## 2024-11-04 NOTE — PROGRESS NOTES
[facility] given referral for home health SN,PT and OT evals.  Spoke with patient confirming all info on facesheet at patient's bedside.  Lives alone with family support.  States has a dog that will need to be secured prior to staff visits, call ahead.  Confirmed PCP is Dr Robledo.  Encouraged patient to schedule post hospital follow up apt with PCP after getting home and settled....voiced understanding.

## 2024-11-04 NOTE — DISCHARGE SUMMARY
NAME: Bird Gallegos ADMIT: 2024   : 1945  PCP: Ning Robledo MD    MRN: [id number] LOS: 0 days   AGE/SEX: 79 y.o. male  ROOM: P591/1     Date of Admission:  2024  Date of Discharge:  2024    PCP: Ning Robledo MD    CHIEF COMPLAINT  Dizziness      DISCHARGE DIAGNOSIS  Active Hospital Problems    Diagnosis  POA    **Hypertensive urgency [I16.0]  Unknown    Stroke-like symptoms [R29.90]  Yes    History of stroke [Z86.73]  Not Applicable    Type 2 diabetes mellitus, without long-term current use of insulin [E11.9]  Yes    Hypertension [I10]  Yes    Coronary artery disease involving native coronary artery of native heart [I25.10]  Yes    Nonrheumatic aortic valve stenosis [I35.0]  Yes    STEF on auto CPAP [G47.33]  Yes    Class 3 severe obesity due to excess calories with serious comorbidity and body mass index (BMI) of 40.0 to 44.9 in adult [E66.813, E66.01, Z68.41]  Not Applicable      Resolved Hospital Problems   No resolved problems to display.       SECONDARY DIAGNOSES  Past Medical History:   Diagnosis Date    Anxiety and depression     Aortic stenosis     Arthritis     Coronary artery disease     CTS (carpal tunnel syndrome)     Degenerative joint disease (DJD) of lumbar spine     Diabetes mellitus     Diabetic neuropathy     Difficulty walking     Diverticulosis     COLVIN (dyspnea on exertion)     GERD (gastroesophageal reflux disease)     Headache, tension-type     Heart murmur     History of cellulitis     LEFT LEG    HL (hearing loss)     Hypertension     STEF (obstructive sleep apnea)     STEF on auto CPAP     Stroke     Vision loss        CONSULTS   Neurology    HOSPITAL COURSE  79 year old man with type 2 diabetes, hypertension, hyperlipidemia, coronary artery disease s/p 1v CABG, aortic stenosis s/p bioprosthetic AVR, STEF, history of stroke, who presents following an episode of weakness and lightheadedness, left side more so.      Symptoms were thought to be tia vs hypertension  related. He was seen by Neurology. Given statin, plavix. BP control with adjustments in HTN medications as BP was quite high on admission. MRI was negative for acute stroke. He is back to baseline and wishes for discharge today and follow up with his outpatient providers.        DIAGNOSTICS    2D ECHO 11/2/24    Left ventricular systolic function is normal. Left ventricular ejection fraction appears to be 66 - 70%.    Left ventricular diastolic function was indeterminate.    The left atrial cavity is severely dilated.    Saline test results are negative.    Mild to moderate aortic valve stenosis is present. Aortic valve area is 1 cm2.    Peak velocity of the flow distal to the aortic valve is 280 cm/s. Aortic valve mean pressure gradient is 17 mmHg.    Estimated right ventricular systolic pressure from tricuspid regurgitation is normal (<35 mmHg).      MRI Brain Without Contrast [[id number]] Micah as Reviewed   Order Status: Completed Collected: 11/02/24 1314    Updated: 11/02/24 1323   Narrative:     MRI BRAIN WO CONTRAST-     HISTORY:  stroke; R29.90-Unspecified symptoms and signs involving the  nervous system; I10-Essential (primary) hypertension;  R73.9-Hyperglycemia, unspecified; Z86.79-Personal history of other  diseases of the circulatory system     COMPARISON: CT head 11/1/2024.     TECHNIQUE: A MRI examination of the brain was performed utilizing  sagittal T1, axial diffusion, T1, T2, T2 FLAIR and susceptibility  weighted imaging.     FINDINGS: The diffusion sequence demonstrates a small area of increased  signal intensity involving the medial aspect of the left frontal lobe  anteriorly (image 53). This was not present on the MRI examination of  11/15/2019. There is no evidence of corresponding signal abnormality on  the axial T2 or T2 FLAIR sequences. It is adjacent to an area of  prominent falcine calcification. There is expected flow void in the  basilar artery and in the distal aspects of the internal  carotid  arteries bilaterally on axial T2 sequence. Mild mucosal thickening  involving the sphenoid sinus is noted. Mild small vessel ischemic  disease is appreciated axial T2 FLAIR sequence. Bilateral lens implants  are appreciated. The axial T2 FLAIR sequence demonstrates mild small  vessel ischemic disease.      Impression:     There is no convincing evidence of an acute infarct. There  is a 4 mm area of increased signal intensity about the cortex of the  left frontal lobe anteriorly and medially. This is adjacent to prominent  falcine calcification and is likely artifactual. A small cortical  infarct could not be entirely excluded but is thought to be less likely.  Mild small vessel ischemic disease and mild mucosal thickening involving  the sphenoid sinus is noted. There is no evidence of mass or mass effect  on this noncontrasted MRI examination the brain.     This report was finalized on 11/2/2024 1:20 PM by Dr. Blaise Suárez M.D  on Workstation: [id number]       XR Chest 1 View [[id number]] Micah as Reviewed   Order Status: Completed Collected: 11/01/24 1857    Updated: 11/01/24 1901   Narrative:     XR CHEST 1 VW-     HISTORY: Male who is 79 years-old, stroke protocol     TECHNIQUE: Frontal view of the chest     COMPARISON: 3/29/2019     FINDINGS: The heart size is normal. Pulmonary vasculature is congested.  Sternotomy wires are present. No focal pulmonary consolidation, pleural  effusion, or pneumothorax. No acute osseous process.      Impression:     No focal pulmonary consolidation. Pulmonary vascular  congestion.     This report was finalized on 11/1/2024 6:58 PM by Dr. Rich Bansal M.D on Workstation: [id number]       CT Angiogram Head w AI Analysis of LVO [[id number]] Mciah as Reviewed   Order Status: Completed Collected: 11/01/24 1814    Updated: 11/01/24 2049   Narrative:     CT ANGIOGRAM OF THE HEAD AND NECK AND CT PERFUSION STUDY     CLINICAL HISTORY:  Stroke, follow up     TECHNIQUE:  A  noncontrast head CT was performed with 3 mm axial images.  Thereafter, a CT perfusion study was performed after the dynamic bolus  of IV contrast. Standard perfusion maps were constructed with RAPID  software. A CT angiogram of the head and neck was then performed with 1  mm axial images. Sagittal, coronal, and 3-dimensional reconstructed  images were obtained. Finally, a delayed postcontrast head CT was  performed with 3 mm axial images.     FINDINGS:     NONCONTRAST HEAD CT: Mild changes of chronic small vessel ischemic  phenomena. Otherwise, no convincing evidence for an area of acute  infarction.     Incidental note of mild inflammatory paranasal sinus disease with  mucosal thickening involving the ethmoid air cells and the sphenoid  sinus.     CT PERFUSION STUDY: No asymmetries evident on the Tmax greater than 6  seconds or the CBF less than 30% maps. Scattered areas of apparent  hypoperfusion noted within both cerebral and cerebellar hemispheres on  the Tmax greater than 4-second maps which are felt to most likely be  artifactual in nature. However, further evaluation could be performed  with MR imaging.     CTA NECK:       AORTIC ARCH: Classic configuration. Mild atherosclerotic changes.  SUBCLAVIAN ARTERIES: Mild stenosis of the left subclavian artery  proximal to the origin of the left vertebral artery.  VERTEBRAL ARTERIES: Moderate stenoses at the origins of the vertebral  arteries.  CCA/ICAs: Mild stenosis at the origin of the left common carotid artery.  Approximate 55% NASCET stenosis within the proximal portion of the left  ICA. Approximate 45% to 50% NASCET stenosis within the proximal portion  of the right ICA.     CTA HEAD:     ANTERIOR CIRCULATION: Mild degrees of stenoses within the carotid  siphons. Unremarkable appearance of the middle and anterior cerebral  arteries.  POSTERIOR CIRCULATION: Moderate stenosis within the V4 segment of the  left vertebral artery. Moderate stenosis of the left P3  segment.  DELAYED POSTCONTRAST HEAD CT: No abnormal foci of contrast enhancement  are noted within the brain parenchyma.      Impression:        No convincing evidence to suggest an acute infarct on the noncontrast  head CT or the CT perfusion study. No significant perfusion asymmetries  are evident on the Tmax greater than 6 seconds or CBF less than 30%  maps.     Approximate 55% stenosis within the proximal portion of the left ICA and  45 to 50% stenosis within the proximal portion of the right ICA by  NASCET criteria.     Moderate stenoses within the origins of both vertebral arteries.     Moderate stenosis of the left P3 segment.     The findings of the noncontrast head CT were discussed with Dr. Hercules on 11/01/2024 at approximately 5:26 p.m. The findings of the  CT angiogram and CT perfusion study were discussed with Dr. Hercules at  approximately 5:40 p.m.        Radiation dose reduction techniques were utilized, including automated  exposure control and exposure modulation based on body size.        This report was finalized on 11/1/2024 8:46 PM by Dr. Max Sánchez M.D  on Workstation: [id number]       CT Angiogram Neck [[id number]] Micah as Reviewed   Order Status: Completed Collected: 11/01/24 1814    Updated: 11/01/24 2049   Narrative:     CT ANGIOGRAM OF THE HEAD AND NECK AND CT PERFUSION STUDY     CLINICAL HISTORY:  Stroke, follow up     TECHNIQUE:  A noncontrast head CT was performed with 3 mm axial images.  Thereafter, a CT perfusion study was performed after the dynamic bolus  of IV contrast. Standard perfusion maps were constructed with RAPID  software. A CT angiogram of the head and neck was then performed with 1  mm axial images. Sagittal, coronal, and 3-dimensional reconstructed  images were obtained. Finally, a delayed postcontrast head CT was  performed with 3 mm axial images.     FINDINGS:     NONCONTRAST HEAD CT: Mild changes of chronic small vessel ischemic  phenomena. Otherwise, no  convincing evidence for an area of acute  infarction.     Incidental note of mild inflammatory paranasal sinus disease with  mucosal thickening involving the ethmoid air cells and the sphenoid  sinus.     CT PERFUSION STUDY: No asymmetries evident on the Tmax greater than 6  seconds or the CBF less than 30% maps. Scattered areas of apparent  hypoperfusion noted within both cerebral and cerebellar hemispheres on  the Tmax greater than 4-second maps which are felt to most likely be  artifactual in nature. However, further evaluation could be performed  with MR imaging.     CTA NECK:       AORTIC ARCH: Classic configuration. Mild atherosclerotic changes.  SUBCLAVIAN ARTERIES: Mild stenosis of the left subclavian artery  proximal to the origin of the left vertebral artery.  VERTEBRAL ARTERIES: Moderate stenoses at the origins of the vertebral  arteries.  CCA/ICAs: Mild stenosis at the origin of the left common carotid artery.  Approximate 55% NASCET stenosis within the proximal portion of the left  ICA. Approximate 45% to 50% NASCET stenosis within the proximal portion  of the right ICA.     CTA HEAD:     ANTERIOR CIRCULATION: Mild degrees of stenoses within the carotid  siphons. Unremarkable appearance of the middle and anterior cerebral  arteries.  POSTERIOR CIRCULATION: Moderate stenosis within the V4 segment of the  left vertebral artery. Moderate stenosis of the left P3 segment.  DELAYED POSTCONTRAST HEAD CT: No abnormal foci of contrast enhancement  are noted within the brain parenchyma.      Impression:        No convincing evidence to suggest an acute infarct on the noncontrast  head CT or the CT perfusion study. No significant perfusion asymmetries  are evident on the Tmax greater than 6 seconds or CBF less than 30%  maps.     Approximate 55% stenosis within the proximal portion of the left ICA and  45 to 50% stenosis within the proximal portion of the right ICA by  NASCET criteria.     Moderate stenoses within  the origins of both vertebral arteries.     Moderate stenosis of the left P3 segment.     The findings of the noncontrast head CT were discussed with Dr. Hercules on 11/01/2024 at approximately 5:26 p.m. The findings of the  CT angiogram and CT perfusion study were discussed with Dr. Hercules at  approximately 5:40 p.m.        11/03/2024 0456 11/03/2024 0624 Basic Metabolic Panel [[id number]]    (Abnormal)   Blood from Arm, Left    Final result Component Value Units   Glucose 136 High  mg/dL   BUN 21 mg/dL   Creatinine 0.94 mg/dL   Sodium 141 mmol/L   Potassium 3.6  mmol/L   Chloride 104 mmol/L   CO2 27.0 mmol/L   Calcium 8.4 Low  mg/dL   BUN/Creatinine Ratio 22.3    Anion Gap 10.0 mmol/L   eGFR 81.4 mL/min/1.73          11/03/2024 0456 11/03/2024 0624 Magnesium [[id number]]   Blood from Arm, Left    Final result Component Value Units   Magnesium 2.4 mg/dL          11/03/2024 0456 11/03/2024 0636 Vitamin B12 [[id number]]    (Abnormal)   Blood from Arm, Left    Final result Component Value Units   Vitamin B-12 970 High  pg/mL          11/02/2024 0419 11/02/2024 0529 CBC (No Diff) [[id number]]   Blood    Final result Component Value Units   WBC 8.23 10*3/mm3   RBC 5.13 10*6/mm3   Hemoglobin 14.4 g/dL   Hematocrit 43.6 %   MCV 85.0 fL   MCH 28.1 pg   MCHC 33.0 g/dL   RDW 13.6 %   RDW-SD 41.7 fl   MPV 11.6 fL   Platelets 209 10*3/mm3          11/02/2024 0419 11/02/2024 0550 Comprehensive Metabolic Panel [[id number]]    (Abnormal)   Blood    Final result Component Value Units   Glucose 267 High  mg/dL   BUN 18 mg/dL   Creatinine 1.09 mg/dL   Sodium 139 mmol/L   Potassium 3.8  mmol/L   Chloride 100 mmol/L   CO2 21.4 Low  mmol/L   Calcium 9.5 mg/dL   Total Protein 6.9 g/dL   Albumin 3.9 g/dL   ALT (SGPT) 16 U/L   AST (SGOT) 17 U/L   Alkaline Phosphatase 64 U/L   Total Bilirubin 0.4 mg/dL   Globulin 3.0 gm/dL   A/G Ratio 1.3 g/dL   BUN/Creatinine Ratio 16.5    Anion Gap 17.6 High  mmol/L   eGFR 68.2 mL/min/1.73         "  11/02/2024 0419 11/02/2024 0710 Hemoglobin A1c [[id number]]    (Abnormal)   Blood    Final result Component Value Units   Hemoglobin A1C 7.00 High  %          11/02/2024 0419 11/02/2024 0548 Lipid Panel [[id number]]    (Abnormal)   Blood    Final result Component Value Units   Total Cholesterol 157 mg/dL   Triglycerides 87 mg/dL   HDL Cholesterol 38 Low  mg/dL   LDL Cholesterol 103 High  mg/dL   VLDL Cholesterol 16 mg/dL   LDL/HDL Ratio 2.67           11/02/2024 0419 11/02/2024 0553 TSH [[id number]]   Blood    Final result Component Value Units   TSH 1.080 uIU/mL        PHYSICAL EXAM  Objective:  Vital signs: (most recent): Blood pressure 119/69, pulse 65, temperature 98.2 °F (36.8 °C), temperature source Oral, resp. rate 20, height 172.7 cm (67.99\"), weight 122 kg (268 lb 15.4 oz), SpO2 93%.                Alert  nad  No resp distress  Back to baseline    CONDITION ON DISCHARGE  Stable.      DISCHARGE DISPOSITION   Home or Self Care      DISCHARGE MEDICATIONS       Your medication list        START taking these medications        Instructions Last Dose Given Next Dose Due   amLODIPine 10 MG tablet  Commonly known as: NORVASC      Take 1 tablet by mouth Daily.              CONTINUE taking these medications        Instructions Last Dose Given Next Dose Due   albuterol sulfate  (90 Base) MCG/ACT inhaler  Commonly known as: PROVENTIL HFA;VENTOLIN HFA;PROAIR HFA      Inhale 2 puffs Every 4 (Four) Hours As Needed for Wheezing or Shortness of Air.       ascorbic acid 1000 MG tablet  Commonly known as: VITAMIN C      Take 1 tablet by mouth Daily.       atorvastatin 40 MG tablet  Commonly known as: LIPITOR      Take 1 tablet by mouth Daily.       carvedilol 25 MG tablet  Commonly known as: COREG      Take 1 tablet by mouth Every 12 (Twelve) Hours.       clonazePAM 1 MG tablet  Commonly known as: KlonoPIN      Take 2 tablets by mouth Every Night.       clopidogrel 75 MG tablet  Commonly known as: PLAVIX      Take 1 " tablet by mouth Daily.       cyanocobalamin 1000 MCG tablet  Commonly known as: VITAMIN B-12      Take 1 tablet by mouth Daily.       fenofibrate 145 MG tablet  Commonly known as: TRICOR      TAKE 1 TABLET EVERY DAY       furosemide 40 MG tablet  Commonly known as: LASIX      Take 1 tablet by mouth Daily.       gabapentin 300 MG capsule  Commonly known as: NEURONTIN      Take 1 capsule by mouth 3 (Three) Times a Day.       glimepiride 1 MG tablet  Commonly known as: AMARYL      Take 0.5 tablets by mouth Every Morning Before Breakfast.       ICAPS AREDS 2 PO      Take 1 tablet by mouth 2 (Two) Times a Day.       isosorbide mononitrate 30 MG 24 hr tablet  Commonly known as: IMDUR      Take 1 tablet by mouth Daily.       lisinopril 20 MG tablet  Commonly known as: PRINIVIL,ZESTRIL      Take 1 tablet by mouth Daily.       loratadine 10 MG tablet  Commonly known as: CLARITIN      Take 1 tablet by mouth Daily.       metFORMIN 1000 MG tablet  Commonly known as: GLUCOPHAGE      Take 1 tablet by mouth 2 (Two) Times a Day With Meals.       olopatadine 0.1 % ophthalmic solution  Commonly known as: PATANOL      Apply 1 drop to eye(s) as directed by provider 2 (Two) Times a Day.       potassium chloride 10 MEQ CR tablet  Commonly known as: KLOR-CON M10      Take 1 tablet by mouth Daily.       sertraline 50 MG tablet  Commonly known as: ZOLOFT      Take 1 tablet by mouth Daily.       tamsulosin 0.4 MG capsule 24 hr capsule  Commonly known as: FLOMAX      Take 1 capsule by mouth Every Night.                 Where to Get Your Medications        These medications were sent to [facility] [id number] - [place], [place] - [address] - [phone]  - [phone]   [address] [address]      Phone: [phone]   amLODIPine 10 MG tablet       Information about where to get these medications is not yet available    Ask your nurse or doctor about these medications  furosemide 40 MG tablet           Future Appointments   Date Time Provider Department Center   5/15/2025 10:00 AM Jensen Mas MD [facility] None     Additional Instructions for the Follow-ups that You Need to Schedule       Ambulatory Referral to Home Health (Hospital)   As directed      Ning Hernandez MD    Order Comments: Ning Hernandez MD    Face to Face Visit Date: 11/4/2024   Follow-up provider for Plan of Care?: I treated the patient in an acute care facility and will not continue treatment after discharge.   Follow-up provider: NING HERNANDEZ [[id number]]   Reason/Clinical Findings: debility, HTN, CHF   Describe mobility limitations that make leaving home difficult: debility, HTN, CHF   Nursing/Therapeutic Services Requested: Skilled Nursing Physical Therapy Occupational Therapy   Skilled nursing orders: Medication education CHF management   PT orders: Therapeutic exercise Strengthening   Occupational orders: Strengthening Activities of daily living Home safety assessment               Follow-up Information       Ning Hernandez MD .    Specialty: Pediatrics  Contact information:  [address] [place] [address] [phone]                             TEST  RESULTS PENDING AT DISCHARGE         Red Goldberg MD  [facility]  11/04/24  10:42 EST      Time: greater than 32 minutes on discharge  It was a pleasure taking care of this patient while in the hospital.

## 2024-11-04 NOTE — CASE MANAGEMENT/SOCIAL WORK
Discharge Planning Assessment  [facility]     Patient Name: Bird Gallegos  MRN: [id number]  Today's Date: 11/4/2024    Admit Date: 11/1/2024    Plan: Plans discharge home with [facility].   Discharge Needs Assessment       Row Name 11/04/24 1336       Living Environment    People in Home alone    Current Living Arrangements home    Potentially Unsafe Housing Conditions none    In the past 12 months has the electric, gas, oil, or water company threatened to shut off services in your home? No    Primary Care Provided by self    Family Caregiver if Needed child(sloane), adult    Family Caregiver Names Alexandra Gallegos/daughter  [phone]  or  Bird Gallegos/son [phone]    Quality of Family Relationships supportive;helpful;involved    Able to Return to Prior Arrangements yes       Resource/Environmental Concerns    Resource/Environmental Concerns none    Transportation Concerns none       Transportation Needs    In the past 12 months, has lack of transportation kept you from medical appointments or from getting medications? no    In the past 12 months, has lack of transportation kept you from meetings, work, or from getting things needed for daily living? No       Food Insecurity    Within the past 12 months, you worried that your food would run out before you got the money to buy more. Never true    Within the past 12 months, the food you bought just didn't last and you didn't have money to get more. Never true       Transition Planning    Patient/Family Anticipates Transition to home with help/services    Patient/Family Anticipated Services at Transition     Transportation Anticipated family or friend will provide       Discharge Needs Assessment    Readmission Within the Last 30 Days no previous admission in last 30 days    Equipment Currently Used at Home walker, rolling    Concerns to be Addressed discharge planning;basic needs;home safety    Anticipated Changes Related to Illness none    Equipment Needed  After Discharge walker, rolling    Provided Post Acute Provider List? Yes    Post Acute Provider List Home Health    Provided Post Acute Provider Quality & Resource List? Yes    Post Acute Provider Quality and Resource List Home Health    Delivered To Patient    Method of Delivery In person    Patient's Choice of Community Agency(s) [facility]                   Discharge Plan       Row Name 11/04/24 7769       Plan    Plan Plans discharge home with [facility].    Plan Comments Spoke with patient at bedside to complete initial assessment. Confirmed information on facesheet. PCP: Ning Robledo MD and Pharmacy: [facility] ([place], [place]). Patient lives alone in single level house with basement. One step to enter. States he's IADLs and uses walker for mobility. Says he needs new walker. Message sent to MD to obtain order and message to Ezequiel/Shereen to notify. Reports no difficulty affording medications or transportation issues. States he was still driving last week. However, he says family can assist with transportation as needed. Patient says he has used home health in the past but can't remember name of agency and has been to [facility] for SNF in the past. Patient was evaluated per PT/OT. Recommends home with HH. Provided patient patient choice list. Agreeable with referral to [facility]. No additional needs noted. Family to transport.......[facility]                  Continued Care and Services - Admitted Since 11/1/2024       Durable Medical Equipment       Service Provider Request Status Services Address Phone Fax Patient Preferred    [facility] Accepted -- [address], [place] [address] [phone] --              Home Medical Care       Service Provider Request Status Services Address Phone Fax Patient Preferred     [facility] Accepted -- [address] [place] [address] [id number] [phone] --                  Expected Discharge Date and Time        Expected Discharge Date Expected Discharge Time    Nov 4, 2024            Demographic Summary    No documentation.                  Functional Status    No documentation.                  Psychosocial    No documentation.                  Abuse/Neglect    No documentation.                  Legal    No documentation.                  Substance Abuse    No documentation.                  Patient Forms    No documentation.                     Ligia Campbell RN

## 2024-11-05 ENCOUNTER — HOME CARE VISIT (OUTPATIENT)
Dept: HOME HEALTH SERVICES | Facility: HOME HEALTHCARE | Age: 79
End: 2024-11-05
Payer: MEDICARE

## 2024-11-05 ENCOUNTER — READMISSION MANAGEMENT (OUTPATIENT)
Dept: CALL CENTER | Facility: HOSPITAL | Age: 79
End: 2024-11-05
Payer: MEDICARE

## 2024-11-05 VITALS — HEART RATE: 60 BPM | SYSTOLIC BLOOD PRESSURE: 118 MMHG | DIASTOLIC BLOOD PRESSURE: 62 MMHG | OXYGEN SATURATION: 95 %

## 2024-11-05 PROCEDURE — G0299 HHS/HOSPICE OF RN EA 15 MIN: HCPCS

## 2024-11-05 NOTE — OUTREACH NOTE
Prep Survey      Flowsheet Row Responses   [facility] patient discharged from? [facility]   Is LACE score < 7 ? No   Eligibility Readm Mgmt   Discharge diagnosis Hypertensive urgency   Does the patient have one of the following disease processes/diagnoses(primary or secondary)? Other   Does the patient have Home health ordered? Yes   What is the Home health agency?   [facility]   Is there a DME ordered? Yes   What DME was ordered? [facility]   Medication alerts for this patient see avs   Prep survey completed? Yes            Krystal VALDEZ - Registered Nurse

## 2024-11-06 ENCOUNTER — HOME CARE VISIT (OUTPATIENT)
Dept: HOME HEALTH SERVICES | Facility: HOME HEALTHCARE | Age: 79
End: 2024-11-06
Payer: MEDICARE

## 2024-11-06 VITALS
OXYGEN SATURATION: 95 % | RESPIRATION RATE: 18 BRPM | TEMPERATURE: 97.3 F | HEART RATE: 65 BPM | SYSTOLIC BLOOD PRESSURE: 125 MMHG | DIASTOLIC BLOOD PRESSURE: 70 MMHG

## 2024-11-06 PROCEDURE — G0151 HHCP-SERV OF PT,EA 15 MIN: HCPCS

## 2024-11-06 NOTE — HOME HEALTH
79M with type 2 diabetes, hypertension, hyperlipidemia, coronary artery disease s/p 1v CABG, aortic stenosis s/p bioprosthetic AVR, STEF, history of stroke.  Recent hospitalization due to tia vs hypertensive crisis. He was seen by Neurology. Given statin, plavix. BP control with adjustments in HTN medications as BP was quite high on admission. MRI was negative for acute stroke. He is back to baseline.  He is new on amlodipine.  Medications reconciled.  Patient not taking Imdur, stating it is not covered by insurance.  VSS.  Uses walker to ambulate.  Lives alone, with children who provide ocassional assistance as needed.  SN FOC HTN.  SN to teach and instruct HTN and medication regime.

## 2024-11-07 ENCOUNTER — HOME CARE VISIT (OUTPATIENT)
Dept: HOME HEALTH SERVICES | Facility: HOME HEALTHCARE | Age: 79
End: 2024-11-07
Payer: MEDICARE

## 2024-11-07 PROCEDURE — G0152 HHCP-SERV OF OT,EA 15 MIN: HCPCS

## 2024-11-07 NOTE — HOME HEALTH
"REASON FOR REFERRAL: Pt is an 78 y/o M who is s/p 1v CABG, aortic stenosis, s/p bioprosthetic AVR, who presented to [facility] c/o general weakness with emphasis on L side. Work up revealed no stroke and BP meds adjusted to gain better control.   PMHx: DM II, HTN, hyperlipidemia, CAD, STEF, CVA.   OT's FOCUS OF CARE: home safety  SUBJECTIVE: \"I'm very lonely, but I'm moving closer to my family here in a few weeks/\"  SOCIAL & ENVIRONMENTAL SITUATION: Pt lives alone in a H with a dog. He has an electric bed, rollator, and walk in shower with grab bars, Hh shower head, and built in seat.   PATIENT'S &/OR CAREGIVER'S GOAL: Pt reports he wishes his balance was better.  INTERVENTIONS: home safety edu  ASSESSMENT: No OT services are indicated at this time. Pt is at PLOF with ADL routine.  PLAN: OT eval only.  PLAN FOR NEXT VISIT:N/A"

## 2024-11-08 ENCOUNTER — HOME CARE VISIT (OUTPATIENT)
Dept: HOME HEALTH SERVICES | Facility: HOME HEALTHCARE | Age: 79
End: 2024-11-08
Payer: MEDICARE

## 2024-11-11 ENCOUNTER — HOME CARE VISIT (OUTPATIENT)
Dept: HOME HEALTH SERVICES | Facility: HOME HEALTHCARE | Age: 79
End: 2024-11-11
Payer: MEDICARE

## 2024-11-11 PROCEDURE — G0300 HHS/HOSPICE OF LPN EA 15 MIN: HCPCS

## 2024-11-12 ENCOUNTER — HOME CARE VISIT (OUTPATIENT)
Dept: HOME HEALTH SERVICES | Facility: HOME HEALTHCARE | Age: 79
End: 2024-11-12
Payer: MEDICARE

## 2024-11-12 VITALS
SYSTOLIC BLOOD PRESSURE: 138 MMHG | OXYGEN SATURATION: 97 % | RESPIRATION RATE: 18 BRPM | TEMPERATURE: 97.3 F | HEART RATE: 88 BPM | DIASTOLIC BLOOD PRESSURE: 82 MMHG

## 2024-11-12 VITALS
OXYGEN SATURATION: 98 % | HEART RATE: 53 BPM | TEMPERATURE: 97.5 F | SYSTOLIC BLOOD PRESSURE: 101 MMHG | DIASTOLIC BLOOD PRESSURE: 61 MMHG

## 2024-11-12 PROCEDURE — G0151 HHCP-SERV OF PT,EA 15 MIN: HCPCS

## 2024-11-14 ENCOUNTER — READMISSION MANAGEMENT (OUTPATIENT)
Dept: CALL CENTER | Facility: HOSPITAL | Age: 79
End: 2024-11-14
Payer: MEDICARE

## 2024-11-14 ENCOUNTER — HOME CARE VISIT (OUTPATIENT)
Dept: HOME HEALTH SERVICES | Facility: HOME HEALTHCARE | Age: 79
End: 2024-11-14
Payer: MEDICARE

## 2024-11-14 VITALS
HEART RATE: 55 BPM | OXYGEN SATURATION: 97 % | TEMPERATURE: 98.2 F | RESPIRATION RATE: 18 BRPM | DIASTOLIC BLOOD PRESSURE: 64 MMHG | SYSTOLIC BLOOD PRESSURE: 128 MMHG

## 2024-11-14 PROCEDURE — G0300 HHS/HOSPICE OF LPN EA 15 MIN: HCPCS

## 2024-11-14 PROCEDURE — G0151 HHCP-SERV OF PT,EA 15 MIN: HCPCS

## 2024-11-14 NOTE — OUTREACH NOTE
Medical Week 2 Survey      Flowsheet Row Responses   [facility] patient discharged from? [place]   Does the patient have one of the following disease processes/diagnoses(primary or secondary)? Other   Week 2 attempt successful? No   Unsuccessful attempts Attempt 1            Janene JOHNS - Licensed Nurse

## 2024-11-18 ENCOUNTER — HOME CARE VISIT (OUTPATIENT)
Dept: HOME HEALTH SERVICES | Facility: HOME HEALTHCARE | Age: 79
End: 2024-11-18
Payer: MEDICARE

## 2024-11-18 PROCEDURE — G0300 HHS/HOSPICE OF LPN EA 15 MIN: HCPCS

## 2024-11-19 ENCOUNTER — READMISSION MANAGEMENT (OUTPATIENT)
Dept: CALL CENTER | Facility: HOSPITAL | Age: 79
End: 2024-11-19
Payer: MEDICARE

## 2024-11-19 ENCOUNTER — HOME CARE VISIT (OUTPATIENT)
Dept: HOME HEALTH SERVICES | Facility: HOME HEALTHCARE | Age: 79
End: 2024-11-19
Payer: MEDICARE

## 2024-11-19 VITALS
HEART RATE: 57 BPM | DIASTOLIC BLOOD PRESSURE: 67 MMHG | SYSTOLIC BLOOD PRESSURE: 136 MMHG | TEMPERATURE: 96.7 F | OXYGEN SATURATION: 97 %

## 2024-11-19 VITALS
HEART RATE: 60 BPM | TEMPERATURE: 97.4 F | SYSTOLIC BLOOD PRESSURE: 142 MMHG | RESPIRATION RATE: 18 BRPM | DIASTOLIC BLOOD PRESSURE: 66 MMHG | OXYGEN SATURATION: 97 %

## 2024-11-19 PROCEDURE — G0151 HHCP-SERV OF PT,EA 15 MIN: HCPCS

## 2024-11-19 NOTE — OUTREACH NOTE
Medical Week 2 Survey      Flowsheet Row Responses   [facility] patient discharged from? [place]   Does the patient have one of the following disease processes/diagnoses(primary or secondary)? Other   Week 2 attempt successful? Yes   Call start time 1301   Discharge diagnosis Hypertensive urgency   Call end time 1304   Meds reviewed with patient/caregiver? Yes   Is the patient having any side effects they believe may be caused by any medication additions or changes? No   Does the patient have all medications ordered at discharge? Yes   Is the patient taking all medications as directed (includes completed medication regime)? Yes   Does the patient have a primary care provider?  Yes   Does the patient have an appointment with their PCP within 7 days of discharge? No   What is preventing the patient from scheduling follow up appointments within 7 days of discharge? Haven't had time   Has the patient kept scheduled appointments due by today? N/A   What is the Home health agency?  [facility]   Has home health visited the patient within 72 hours of discharge? Yes   Psychosocial issues? No   Did the patient receive a copy of their discharge instructions? Yes   Nursing interventions Reviewed instructions with patient   What is the patient's perception of their health status since discharge? Improving   Is the patient/caregiver able to teach back signs and symptoms related to disease process for when to call PCP? Yes   Is the patient/caregiver able to teach back signs and symptoms related to disease process for when to call 911? Yes   Is the patient/caregiver able to teach back the hierarchy of who to call/visit for symptoms/problems? PCP, Specialist, Home health nurse, Urgent Care, ED, 911 Yes   Additional teach back comments States he is doing well.   Week 2 Call Completed? Yes   Graduated Yes   Graduated/Revoked comments Pt advised to keep bp log for PCP   Call end time 1304            Janene Mansfield  Nurse

## 2024-11-21 ENCOUNTER — HOME CARE VISIT (OUTPATIENT)
Dept: HOME HEALTH SERVICES | Facility: HOME HEALTHCARE | Age: 79
End: 2024-11-21
Payer: MEDICARE

## 2024-11-21 VITALS
HEART RATE: 59 BPM | SYSTOLIC BLOOD PRESSURE: 129 MMHG | TEMPERATURE: 96 F | OXYGEN SATURATION: 94 % | DIASTOLIC BLOOD PRESSURE: 65 MMHG

## 2024-11-21 PROCEDURE — G0151 HHCP-SERV OF PT,EA 15 MIN: HCPCS

## 2024-11-21 PROCEDURE — G0300 HHS/HOSPICE OF LPN EA 15 MIN: HCPCS

## 2024-11-21 NOTE — Clinical Note
Patient discharged from physical therapy on 11/21/2024 to self, in good condition, with all goals met.

## 2024-11-21 NOTE — Clinical Note
Please forward to provider = Dr. Ning Robledo    Bird Gallegos was discharged from home health physical therapy on 11/21/2024 with all goals met. He is independent with ambulation using a rollator, independent with transfers and independent with his HEP.

## 2024-11-21 NOTE — HOME HEALTH
"Subjective: \"I'm feeling pretty good.\"    Assessment: Patient has progressed well with all goals met. Patient in independent with ambulation using a rollator, independent with transfers and independent with his HEP.     Communication: Case communication to Dr. Ning Robledo; case communication to Annalee Rosario RN clinical manager, Samira Story LPN and Jose Antonio Umaña, "

## 2024-11-22 VITALS
RESPIRATION RATE: 18 BRPM | OXYGEN SATURATION: 97 % | HEART RATE: 54 BPM | SYSTOLIC BLOOD PRESSURE: 130 MMHG | DIASTOLIC BLOOD PRESSURE: 66 MMHG | TEMPERATURE: 97.4 F

## 2024-11-26 ENCOUNTER — HOME CARE VISIT (OUTPATIENT)
Dept: HOME HEALTH SERVICES | Facility: HOME HEALTHCARE | Age: 79
End: 2024-11-26
Payer: MEDICARE

## 2024-12-04 ENCOUNTER — HOME CARE VISIT (OUTPATIENT)
Dept: HOME HEALTH SERVICES | Facility: HOME HEALTHCARE | Age: 79
End: 2024-12-04
Payer: MEDICARE

## 2024-12-04 VITALS
HEART RATE: 54 BPM | SYSTOLIC BLOOD PRESSURE: 110 MMHG | OXYGEN SATURATION: 100 % | DIASTOLIC BLOOD PRESSURE: 70 MMHG | RESPIRATION RATE: 18 BRPM

## 2024-12-04 PROCEDURE — G0493 RN CARE EA 15 MIN HH/HOSPICE: HCPCS

## 2024-12-05 NOTE — HOME HEALTH
Feeling much better.  Getting ready to close on house Friday and move close to children.    VSS.  Reviewed medication.    Patient agreeable to agency d/c.

## (undated) DEVICE — GLIDESHEATH BASIC HYDROPHILIC COATED INTRODUCER SHEATH: Brand: GLIDESHEATH

## (undated) DEVICE — SUT ETHIBOND 2/0 CV V5  30IN PXX52

## (undated) DEVICE — TBG ART PRESS 60 IN

## (undated) DEVICE — ORGANIZER SUT SHELIGH 3T 213013

## (undated) DEVICE — CLAMP INSERT: Brand: STEALTH® CLAMP INSERT

## (undated) DEVICE — INTENDED FOR TISSUE SEPARATION, AND OTHER PROCEDURES THAT REQUIRE A SHARP SURGICAL BLADE TO PUNCTURE OR CUT.: Brand: BARD-PARKER ® CARBON RIB-BACK BLADES

## (undated) DEVICE — BIOPATCH™ ANTIMICROBIAL DRESSING WITH CHLORHEXIDINE GLUCONATE IS A HYDROPHILLIC POLYURETHANE ABSORPTIVE FOAM WITH CHLORHEXIDINE GLUCONATE (CHG) WHICH INHIBITS BACTERIAL GROWTH UNDER THE DRESSING. THE DRESSING IS INTENDED TO BE USED TO ABSORB EXUDATE, COVER A WOUND CAUSED BY VASCULAR AND NONVASCULAR PERCUTANEOUS MEDICAL DEVICES DURING SURGERY, AS WELL AS REDUCE LOCAL INFECTION AND COLONIZATION OF MICROORGANISMS.: Brand: BIOPATCH

## (undated) DEVICE — CATH DIAG IMPULSE FL4 5F 100CM

## (undated) DEVICE — TEMP PACING WIRE: Brand: MYO/WIRE

## (undated) DEVICE — OASIS DRAIN, SINGLE, INLINE & ATS COMPATIBLE: Brand: OASIS

## (undated) DEVICE — CANN AORT ROOT DLP VNT 14G 7F

## (undated) DEVICE — Device

## (undated) DEVICE — ADAPT ANTEGRADE RETRGR

## (undated) DEVICE — BNDG ELAS ELITE V/CLOSE 6IN 5YD LF STRL

## (undated) DEVICE — GLV SURG BIOGEL LTX PF 7 1/2

## (undated) DEVICE — SPNG DISECTOR KTNER XRAY COTN 1/4X9/16IN PK/5

## (undated) DEVICE — SPNG GZ WOVN 4X4IN 12PLY 10/BX STRL

## (undated) DEVICE — CANN RETRGR STYLET RSCP 15F

## (undated) DEVICE — CANN VESL FREE FLO 2MM

## (undated) DEVICE — INSUFFLATION TUBING,LAPAROSCOPIC: Brand: DEROYAL

## (undated) DEVICE — TOWEL,OR,DSP,ST,BLUE,STD,4/PK,20PK/CS: Brand: MEDLINE

## (undated) DEVICE — 28 FR RIGHT ANGLE – SOFT PVC CATHETER: Brand: PVC THORACIC CATHETERS

## (undated) DEVICE — CORONARY ARTERY BYPASS GRAFT MARKERS, STAINLESS STEEL, DISTAL, WITHOUT HOLDER: Brand: ANASTOMARK CORONARY ARTERY BYPASS GRAFT MARKERS, STAINLESS STEEL, DISTAL

## (undated) DEVICE — CATH VENT DLP W/CONN MALL NOVNT SILICON 16FR 16IN

## (undated) DEVICE — SUT MNCRYL 3/0 SH 27 IN Y416H

## (undated) DEVICE — PK ATS CUST W CARDIOTOMY RESEVOIR

## (undated) DEVICE — PK HEART OPN 40

## (undated) DEVICE — HEMOCONCENTRATOR PERFUS LPS06

## (undated) DEVICE — GW EMR FIX EXCHG J STD .035 3MM 260CM

## (undated) DEVICE — SYR LL 3CC

## (undated) DEVICE — ROTATING SURGICAL PUNCHES, 1 PER POUCH: Brand: A&E MEDICAL / ROTATING SURGICAL PUNCHES

## (undated) DEVICE — DRP SLUSH MACH FOR STND ALONE OM-ORS-321

## (undated) DEVICE — 3M™ MEDIPORE™ H SOFT CLOTH SURGICAL TAPE 2862, 2 INCH X 10 YARD (5CM X 9,1M), 12 ROLLS/CASE: Brand: 3M™ MEDIPORE™

## (undated) DEVICE — CATHETER,URETHRAL,REDRUBBER,STERILE,20FR: Brand: MEDLINE

## (undated) DEVICE — OPTIFOAM GENTLE SA, POSTOP, 4X12: Brand: MEDLINE

## (undated) DEVICE — CATH DIAG IMPULSE FR4 5F 100CM

## (undated) DEVICE — ST. SORBAVIEW ULTIMATE IJ SYSTEM A,C: Brand: CENTURION

## (undated) DEVICE — TOWEL,OR,DSP,ST,WHITE,DLX,4/PK,20PK/CS: Brand: MEDLINE

## (undated) DEVICE — PK CATH CARD 40

## (undated) DEVICE — SENSR CERBRL O2 PK/2

## (undated) DEVICE — SOL ISO/ALC RUB 70PCT 4OZ

## (undated) DEVICE — 28 FR STRAIGHT – SOFT PVC CATHETER: Brand: PVC THORACIC CATHETERS

## (undated) DEVICE — CATH DIAG IMPULSE FL3.5 5F 100CM

## (undated) DEVICE — KT MANIFLD CARDIAC

## (undated) DEVICE — PK PERFUS CUST W/CARDIOPLEGIA

## (undated) DEVICE — GOWN,NON-REINFORCED,SIRUS,SET IN SLV,XXL: Brand: MEDLINE

## (undated) DEVICE — ST TOURNI COMPL A/ 7IN

## (undated) DEVICE — BLOWER/MISTER AXIOUS OPCAB W/TBG

## (undated) DEVICE — BNDG ELAS ELITE V/CLOSE 4IN 5YD LF STRL

## (undated) DEVICE — MYOCARDIAL PROBE NON-PYROGENIC: Brand: DEROYAL

## (undated) DEVICE — 8 FOOT DISPOSABLE EXTENSION CABLE WITH SAFE CONNECT / ALLIGATOR CLIP

## (undated) DEVICE — VESSEL LOOPS X-RAY DETECTABLE: Brand: DEROYAL

## (undated) DEVICE — DRSNG WND BORDR/ADHS NONADHR/GZ LF 4X14IN STRL

## (undated) DEVICE — DRSNG SURESITE WNDW 2.38X2.75

## (undated) DEVICE — TR BAND RADIAL ARTERY COMPRESSION DEVICE: Brand: TR BAND

## (undated) DEVICE — GLV SURG BIOGEL LTX PF 8 1/2

## (undated) DEVICE — VASOVIEW VV6 PRO: Brand: VASOVIEW VV6 PRO

## (undated) DEVICE — SYS PERFUS SEP PLATLT W TIPS CUST